# Patient Record
Sex: FEMALE | Race: WHITE | Employment: PART TIME | URBAN - METROPOLITAN AREA
[De-identification: names, ages, dates, MRNs, and addresses within clinical notes are randomized per-mention and may not be internally consistent; named-entity substitution may affect disease eponyms.]

---

## 2018-03-31 ENCOUNTER — HOSPITAL ENCOUNTER (EMERGENCY)
Age: 58
Discharge: HOME | End: 2018-03-31
Attending: EMERGENCY MEDICINE
Payer: MEDICAID

## 2018-03-31 ENCOUNTER — APPOINTMENT (OUTPATIENT)
Dept: CT IMAGING | Age: 58
End: 2018-03-31
Payer: MEDICAID

## 2018-03-31 VITALS
DIASTOLIC BLOOD PRESSURE: 58 MMHG | TEMPERATURE: 98 F | BODY MASS INDEX: 28.34 KG/M2 | HEART RATE: 116 BPM | RESPIRATION RATE: 16 BRPM | OXYGEN SATURATION: 95 % | WEIGHT: 166 LBS | HEIGHT: 64 IN | SYSTOLIC BLOOD PRESSURE: 128 MMHG

## 2018-03-31 DIAGNOSIS — K57.32 DIVERTICULITIS OF COLON: Primary | ICD-10-CM

## 2018-03-31 LAB
ALBUMIN SERPL-MCNC: 4.5 G/DL (ref 3.5–5.2)
ALP BLD-CCNC: 80 U/L (ref 35–104)
ALT SERPL-CCNC: 13 U/L (ref 0–32)
ANION GAP SERPL CALCULATED.3IONS-SCNC: 14 MMOL/L (ref 7–16)
AST SERPL-CCNC: 25 U/L (ref 0–31)
BACTERIA: ABNORMAL /HPF
BASOPHILS ABSOLUTE: 0.08 E9/L (ref 0–0.2)
BASOPHILS RELATIVE PERCENT: 0.8 % (ref 0–2)
BILIRUB SERPL-MCNC: 0.3 MG/DL (ref 0–1.2)
BILIRUBIN URINE: NEGATIVE
BLOOD, URINE: NEGATIVE
BUN BLDV-MCNC: 19 MG/DL (ref 6–20)
CALCIUM SERPL-MCNC: 9.7 MG/DL (ref 8.6–10.2)
CHLORIDE BLD-SCNC: 105 MMOL/L (ref 98–107)
CLARITY: ABNORMAL
CO2: 22 MMOL/L (ref 22–29)
COLOR: ABNORMAL
CREAT SERPL-MCNC: 0.8 MG/DL (ref 0.5–1)
EOSINOPHILS ABSOLUTE: 0.19 E9/L (ref 0.05–0.5)
EOSINOPHILS RELATIVE PERCENT: 1.9 % (ref 0–6)
EPITHELIAL CELLS, UA: ABNORMAL /HPF
GFR AFRICAN AMERICAN: >60
GFR NON-AFRICAN AMERICAN: >60 ML/MIN/1.73
GLUCOSE BLD-MCNC: 145 MG/DL (ref 74–109)
GLUCOSE URINE: NEGATIVE MG/DL
HCT VFR BLD CALC: 43.7 % (ref 34–48)
HEMOGLOBIN: 14.5 G/DL (ref 11.5–15.5)
IMMATURE GRANULOCYTES #: 0.04 E9/L
IMMATURE GRANULOCYTES %: 0.4 % (ref 0–5)
KETONES, URINE: NEGATIVE MG/DL
LACTIC ACID: 1.3 MMOL/L (ref 0.5–2.2)
LEUKOCYTE ESTERASE, URINE: ABNORMAL
LIPASE: 80 U/L (ref 13–60)
LYMPHOCYTES ABSOLUTE: 1.82 E9/L (ref 1.5–4)
LYMPHOCYTES RELATIVE PERCENT: 18.2 % (ref 20–42)
MCH RBC QN AUTO: 28.4 PG (ref 26–35)
MCHC RBC AUTO-ENTMCNC: 33.2 % (ref 32–34.5)
MCV RBC AUTO: 85.7 FL (ref 80–99.9)
MONOCYTES ABSOLUTE: 0.63 E9/L (ref 0.1–0.95)
MONOCYTES RELATIVE PERCENT: 6.3 % (ref 2–12)
NEUTROPHILS ABSOLUTE: 7.22 E9/L (ref 1.8–7.3)
NEUTROPHILS RELATIVE PERCENT: 72.4 % (ref 43–80)
NITRITE, URINE: NEGATIVE
PDW BLD-RTO: 13.8 FL (ref 11.5–15)
PH UA: 5.5 (ref 5–9)
PLATELET # BLD: 155 E9/L (ref 130–450)
PMV BLD AUTO: 11.6 FL (ref 7–12)
POTASSIUM SERPL-SCNC: 4.8 MMOL/L (ref 3.5–5)
PROTEIN UA: NEGATIVE MG/DL
RBC # BLD: 5.1 E12/L (ref 3.5–5.5)
RBC UA: ABNORMAL /HPF (ref 0–2)
SODIUM BLD-SCNC: 141 MMOL/L (ref 132–146)
SPECIFIC GRAVITY UA: <=1.005 (ref 1–1.03)
TOTAL PROTEIN: 7.3 G/DL (ref 6.4–8.3)
UROBILINOGEN, URINE: 0.2 E.U./DL
WBC # BLD: 10 E9/L (ref 4.5–11.5)
WBC UA: ABNORMAL /HPF (ref 0–5)

## 2018-03-31 PROCEDURE — 6370000000 HC RX 637 (ALT 250 FOR IP): Performed by: EMERGENCY MEDICINE

## 2018-03-31 PROCEDURE — 83690 ASSAY OF LIPASE: CPT

## 2018-03-31 PROCEDURE — 96374 THER/PROPH/DIAG INJ IV PUSH: CPT

## 2018-03-31 PROCEDURE — 80053 COMPREHEN METABOLIC PANEL: CPT

## 2018-03-31 PROCEDURE — 81001 URINALYSIS AUTO W/SCOPE: CPT

## 2018-03-31 PROCEDURE — 85025 COMPLETE CBC W/AUTO DIFF WBC: CPT

## 2018-03-31 PROCEDURE — 74177 CT ABD & PELVIS W/CONTRAST: CPT

## 2018-03-31 PROCEDURE — 6360000002 HC RX W HCPCS: Performed by: EMERGENCY MEDICINE

## 2018-03-31 PROCEDURE — 99284 EMERGENCY DEPT VISIT MOD MDM: CPT

## 2018-03-31 PROCEDURE — 36415 COLL VENOUS BLD VENIPUNCTURE: CPT

## 2018-03-31 PROCEDURE — 83605 ASSAY OF LACTIC ACID: CPT

## 2018-03-31 PROCEDURE — 6360000004 HC RX CONTRAST MEDICATION: Performed by: RADIOLOGY

## 2018-03-31 PROCEDURE — 2580000003 HC RX 258: Performed by: EMERGENCY MEDICINE

## 2018-03-31 RX ORDER — KETOROLAC TROMETHAMINE 15 MG/ML
15 INJECTION, SOLUTION INTRAMUSCULAR; INTRAVENOUS ONCE
Status: COMPLETED | OUTPATIENT
Start: 2018-03-31 | End: 2018-03-31

## 2018-03-31 RX ORDER — CIPROFLOXACIN 500 MG/1
500 TABLET, FILM COATED ORAL ONCE
Status: COMPLETED | OUTPATIENT
Start: 2018-03-31 | End: 2018-03-31

## 2018-03-31 RX ORDER — METRONIDAZOLE 500 MG/1
500 TABLET ORAL 2 TIMES DAILY
Qty: 14 TABLET | Refills: 0 | Status: SHIPPED | OUTPATIENT
Start: 2018-03-31 | End: 2018-04-07

## 2018-03-31 RX ORDER — 0.9 % SODIUM CHLORIDE 0.9 %
1000 INTRAVENOUS SOLUTION INTRAVENOUS ONCE
Status: COMPLETED | OUTPATIENT
Start: 2018-03-31 | End: 2018-03-31

## 2018-03-31 RX ORDER — METRONIDAZOLE 500 MG/1
500 TABLET ORAL ONCE
Status: COMPLETED | OUTPATIENT
Start: 2018-03-31 | End: 2018-03-31

## 2018-03-31 RX ORDER — CIPROFLOXACIN 500 MG/1
500 TABLET, FILM COATED ORAL 2 TIMES DAILY
Qty: 14 TABLET | Refills: 0 | Status: SHIPPED | OUTPATIENT
Start: 2018-03-31 | End: 2018-04-07

## 2018-03-31 RX ORDER — HYDROCODONE BITARTRATE AND ACETAMINOPHEN 5; 325 MG/1; MG/1
1 TABLET ORAL 3 TIMES DAILY PRN
Qty: 16 TABLET | Refills: 0 | Status: SHIPPED | OUTPATIENT
Start: 2018-03-31 | End: 2018-04-04

## 2018-03-31 RX ADMIN — IOPAMIDOL 80 ML: 755 INJECTION, SOLUTION INTRAVENOUS at 22:06

## 2018-03-31 RX ADMIN — METRONIDAZOLE 500 MG: 500 TABLET ORAL at 22:57

## 2018-03-31 RX ADMIN — CIPROFLOXACIN HYDROCHLORIDE 500 MG: 500 TABLET, FILM COATED ORAL at 22:57

## 2018-03-31 RX ADMIN — KETOROLAC TROMETHAMINE 15 MG: 15 INJECTION, SOLUTION INTRAMUSCULAR; INTRAVENOUS at 20:51

## 2018-03-31 RX ADMIN — SODIUM CHLORIDE 1000 ML: 9 INJECTION, SOLUTION INTRAVENOUS at 20:51

## 2018-03-31 RX ADMIN — IOHEXOL 50 ML: 240 INJECTION, SOLUTION INTRATHECAL; INTRAVASCULAR; INTRAVENOUS; ORAL at 22:06

## 2018-03-31 ASSESSMENT — PAIN DESCRIPTION - LOCATION: LOCATION: ABDOMEN

## 2018-03-31 ASSESSMENT — PAIN DESCRIPTION - ORIENTATION: ORIENTATION: LEFT;LOWER

## 2018-03-31 ASSESSMENT — ENCOUNTER SYMPTOMS
BLOOD IN STOOL: 1
ABDOMINAL PAIN: 0
BACK PAIN: 1
SHORTNESS OF BREATH: 0
BLURRED VISION: 0
DIARRHEA: 0
NAUSEA: 0
VOMITING: 0

## 2018-03-31 ASSESSMENT — PAIN SCALES - GENERAL
PAINLEVEL_OUTOF10: 4
PAINLEVEL_OUTOF10: 6

## 2018-03-31 ASSESSMENT — PAIN DESCRIPTION - DESCRIPTORS: DESCRIPTORS: PATIENT UNABLE TO DESCRIBE

## 2018-03-31 ASSESSMENT — PAIN DESCRIPTION - PAIN TYPE: TYPE: ACUTE PAIN

## 2018-03-31 ASSESSMENT — PAIN DESCRIPTION - FREQUENCY: FREQUENCY: INTERMITTENT

## 2018-07-05 ENCOUNTER — HOSPITAL ENCOUNTER (EMERGENCY)
Age: 58
Discharge: HOME | End: 2018-07-05
Payer: MEDICAID

## 2018-07-05 ENCOUNTER — APPOINTMENT (OUTPATIENT)
Dept: GENERAL RADIOLOGY | Age: 58
End: 2018-07-05
Payer: MEDICAID

## 2018-07-05 VITALS
DIASTOLIC BLOOD PRESSURE: 55 MMHG | OXYGEN SATURATION: 97 % | SYSTOLIC BLOOD PRESSURE: 114 MMHG | TEMPERATURE: 98.2 F | HEIGHT: 64 IN | BODY MASS INDEX: 27.66 KG/M2 | WEIGHT: 162 LBS | HEART RATE: 97 BPM | RESPIRATION RATE: 18 BRPM

## 2018-07-05 DIAGNOSIS — S20.212A CONTUSION OF LEFT CHEST WALL, INITIAL ENCOUNTER: Primary | ICD-10-CM

## 2018-07-05 PROCEDURE — 6360000002 HC RX W HCPCS: Performed by: PHYSICIAN ASSISTANT

## 2018-07-05 PROCEDURE — 71101 X-RAY EXAM UNILAT RIBS/CHEST: CPT

## 2018-07-05 PROCEDURE — 99283 EMERGENCY DEPT VISIT LOW MDM: CPT

## 2018-07-05 PROCEDURE — 96372 THER/PROPH/DIAG INJ SC/IM: CPT

## 2018-07-05 RX ORDER — KETOROLAC TROMETHAMINE 30 MG/ML
30 INJECTION, SOLUTION INTRAMUSCULAR; INTRAVENOUS ONCE
Status: COMPLETED | OUTPATIENT
Start: 2018-07-05 | End: 2018-07-05

## 2018-07-05 RX ORDER — NAPROXEN 500 MG/1
500 TABLET ORAL 2 TIMES DAILY
Qty: 14 TABLET | Refills: 0 | Status: SHIPPED | OUTPATIENT
Start: 2018-07-05 | End: 2020-10-17

## 2018-07-05 RX ADMIN — KETOROLAC TROMETHAMINE 30 MG: 30 INJECTION, SOLUTION INTRAMUSCULAR at 15:20

## 2018-07-05 ASSESSMENT — PAIN SCALES - GENERAL
PAINLEVEL_OUTOF10: 9
PAINLEVEL_OUTOF10: 9

## 2018-07-05 NOTE — ED PROVIDER NOTES
[facility]      HPI:  7/5/18,   Time: 3:15 PM         Tiff Ramsey is a 66 y.o. female presenting to the ED for fall/left rib pain, beginning few hours ago. The complaint has been persistent, moderate in severity, and worsened by bending, twisting. Patient states that she tripped over her toddler grandchild earlier today and hit her left anterior chest wall on the back of a couch. She states that she had some pain at the time and attempted to go to work but the longer she stayed at work and continued to do her job she had increasing amounts of pain. The patient states the pain is all just under her left breast over the ribs. She states that it's worse with bending and moving and turning. She's not had any shortness of breath or trouble breathing. She is a chronic smoker. States that she took 800 mg of Motrin after the incident at 8 AM        ROS:     Constitutional: Negative for fever and chills  HENT: Negative for ear pain, sore throat and sinus pressure  Eyes: Negative for pain, discharge and redness  Respiratory:  Negative for shortness of breath, cough and wheezing  Cardiovascular:  See HPI  Gastrointestinal: Negative for nausea, vomiting, diarrhea and abdominal distention  Genitourinary: Negative for dysuria and frequency  Musculoskeletal: See HPI  Skin: Negative for rash and wound  Neurological: Negative for weakness and headaches  Hematological: Negative for adenopathy    All other systems reviewed and are negative      --------------------------------------------- PAST HISTORY ---------------------------------------------  Past Medical History:  has a past medical history of Diabetes mellitus ([address] and Hyperlipidemia. Past Surgical History:  has a past surgical history that includes Tonsillectomy. Social History:  reports that she has been smoking Cigarettes. She has been smoking about 0.50 packs per day. She has never used smokeless tobacco. She reports that she drinks alcohol.  She reports that she does not use drugs. Family History: family history is not on file. The patients home medications have been reviewed. Allergies: Patient has no known allergies. -------------------------------------------------- RESULTS -------------------------------------------------  All laboratory and radiology results have been personally reviewed by myself   LABS:  No results found for this visit on 07/05/18. RADIOLOGY:  Interpreted by Radiologist.  XR RIBS LEFT INCLUDE CHEST (MIN 3 VIEWS)   Final Result      1. Normal left ribs. 2. Normal frontal chest.             ------------------------- NURSING NOTES AND VITALS REVIEWED ---------------------------   The nursing notes within the ED encounter and vital signs as below have been reviewed. BP (!) 114/55   Pulse 97   Temp 98.2 °F (36.8 °C)   Resp 18   Ht 5' 4\" (1.626 m)   Wt 162 lb (73.5 kg)   SpO2 97%   BMI 27.81 kg/m²   Oxygen Saturation Interpretation: Normal      ---------------------------------------------------PHYSICAL EXAM--------------------------------------      Constitutional/General: Alert and oriented x3, well appearing, non toxic in NAD  Head: NC/AT  Eyes: PERRL, EOMI  Mouth: Oropharynx clear, handling secretions, no trismus  Neck: Supple, full ROM, no meningeal signs  Pulmonary: Lungs clear to auscultation bilaterally, no wheezes, rales, or rhonchi. Not in respiratory distress  Cardiovascular:  Regular rate and rhythm, no murmurs, gallops, or rubs. 2+ distal pulses  Chest:  No visible swelling, rash or trauma. Pt is tender over right anterior distal ribs. No palpable crepitations. Abdomen: Soft, + BS. No distension. Nontender. No tenderness in LUQ>   No palpable rigidity, rebound or guarding  Extremities: Moves all extremities x 4. Warm and well perfused  Skin: warm and dry without rash  Neurologic: GCS 15,  Intact.   No focal deficits  Psych: Normal Affect      ------------------------------ ED COURSE/MEDICAL DECISION MAKING----------------------  Medications   ketorolac (TORADOL) injection 30 mg (30 mg Intramuscular Given 7/5/18 1520)         Medical Decision Making:    No acute displaced fracture seen. Lung fields clear. No abdominal pain. Plan discharge home with meds for pain. F/U PCP as needed if persistent. Counseling: The emergency provider has spoken with the patient and discussed todays results, in addition to providing specific details for the plan of care and counseling regarding the diagnosis and prognosis. Questions are answered at this time and they are agreeable with the plan.      --------------------------------- IMPRESSION AND DISPOSITION ---------------------------------    IMPRESSION  1.  Contusion of left chest wall, initial encounter        DISPOSITION  Disposition: Discharge to home  Patient condition is stable                   Serenity Cortés PA-C  07/07/18 4050

## 2018-07-31 ENCOUNTER — HOSPITAL ENCOUNTER (OUTPATIENT)
Dept: GENERAL RADIOLOGY | Age: 58
Discharge: HOME | End: 2018-08-02
Payer: MEDICAID

## 2018-07-31 ENCOUNTER — HOSPITAL ENCOUNTER (OUTPATIENT)
Age: 58
Discharge: HOME | End: 2018-08-02
Payer: MEDICAID

## 2018-07-31 DIAGNOSIS — J44.9 CHRONIC OBSTRUCTIVE PULMONARY DISEASE, UNSPECIFIED COPD TYPE (HCC): ICD-10-CM

## 2018-07-31 DIAGNOSIS — S20.20XA CONTUSION OF THORACIC WALL, UNSPECIFIED AREA OF THORACIC WALL, INITIAL ENCOUNTER: ICD-10-CM

## 2018-07-31 PROCEDURE — 71100 X-RAY EXAM RIBS UNI 2 VIEWS: CPT

## 2018-07-31 PROCEDURE — 71046 X-RAY EXAM CHEST 2 VIEWS: CPT

## 2018-09-04 ENCOUNTER — HOSPITAL ENCOUNTER (OUTPATIENT)
Dept: GENERAL RADIOLOGY | Age: 58
Discharge: HOME | End: 2018-09-06
Payer: MEDICAID

## 2018-09-04 ENCOUNTER — HOSPITAL ENCOUNTER (OUTPATIENT)
Age: 58
Discharge: HOME | End: 2018-09-06
Payer: MEDICAID

## 2018-09-04 DIAGNOSIS — R52 PAIN: ICD-10-CM

## 2018-09-04 PROCEDURE — 71101 X-RAY EXAM UNILAT RIBS/CHEST: CPT

## 2018-10-05 ENCOUNTER — HOSPITAL ENCOUNTER (OUTPATIENT)
Age: 58
Discharge: HOME | End: 2018-10-07
Payer: MEDICAID

## 2018-10-05 ENCOUNTER — HOSPITAL ENCOUNTER (OUTPATIENT)
Dept: GENERAL RADIOLOGY | Age: 58
Discharge: HOME | End: 2018-10-07
Payer: MEDICAID

## 2018-10-05 DIAGNOSIS — R05.9 COUGH: ICD-10-CM

## 2018-10-05 PROCEDURE — 71048 X-RAY EXAM CHEST 4+ VIEWS: CPT

## 2018-10-23 ENCOUNTER — HOSPITAL ENCOUNTER (OUTPATIENT)
Dept: MAMMOGRAPHY | Age: 58
Discharge: HOME | End: 2018-10-25
Payer: MEDICAID

## 2018-10-23 DIAGNOSIS — Z12.39 BREAST SCREENING: ICD-10-CM

## 2018-10-23 PROCEDURE — 77067 SCR MAMMO BI INCL CAD: CPT

## 2018-12-05 ENCOUNTER — HOSPITAL ENCOUNTER (OUTPATIENT)
Dept: GENERAL RADIOLOGY | Age: 58
Discharge: HOME | End: 2018-12-07
Payer: MEDICAID

## 2018-12-05 ENCOUNTER — HOSPITAL ENCOUNTER (OUTPATIENT)
Age: 58
Discharge: HOME | End: 2018-12-07
Payer: MEDICAID

## 2018-12-05 DIAGNOSIS — J40 BRONCHITIS: ICD-10-CM

## 2018-12-05 PROCEDURE — 71046 X-RAY EXAM CHEST 2 VIEWS: CPT

## 2019-03-13 ENCOUNTER — HOSPITAL ENCOUNTER (OUTPATIENT)
Age: 59
Discharge: HOME | End: 2019-03-13
Payer: MEDICAID

## 2019-03-13 LAB
ALBUMIN SERPL-MCNC: 4.3 G/DL (ref 3.5–5.2)
ALP BLD-CCNC: 87 U/L (ref 35–104)
ALT SERPL-CCNC: 21 U/L (ref 0–32)
ANION GAP SERPL CALCULATED.3IONS-SCNC: 9 MMOL/L (ref 7–16)
AST SERPL-CCNC: 15 U/L (ref 0–31)
BASOPHILS ABSOLUTE: 0.06 E9/L (ref 0–0.2)
BASOPHILS RELATIVE PERCENT: 1.1 % (ref 0–2)
BILIRUB SERPL-MCNC: 0.4 MG/DL (ref 0–1.2)
BILIRUBIN URINE: NEGATIVE
BLOOD, URINE: NEGATIVE
BUN BLDV-MCNC: 17 MG/DL (ref 6–20)
CALCIUM SERPL-MCNC: 9.4 MG/DL (ref 8.6–10.2)
CHLORIDE BLD-SCNC: 105 MMOL/L (ref 98–107)
CHOLESTEROL, TOTAL: 110 MG/DL (ref 0–199)
CLARITY: CLEAR
CO2: 25 MMOL/L (ref 22–29)
COLOR: YELLOW
CREAT SERPL-MCNC: 0.7 MG/DL (ref 0.5–1)
EOSINOPHILS ABSOLUTE: 0.17 E9/L (ref 0.05–0.5)
EOSINOPHILS RELATIVE PERCENT: 3.1 % (ref 0–6)
GFR AFRICAN AMERICAN: >60
GFR NON-AFRICAN AMERICAN: >60 ML/MIN/1.73
GLUCOSE BLD-MCNC: 276 MG/DL (ref 74–99)
GLUCOSE URINE: 250 MG/DL
HBA1C MFR BLD: 9.7 % (ref 4–5.6)
HCT VFR BLD CALC: 45.7 % (ref 34–48)
HDLC SERPL-MCNC: 20 MG/DL
HEMOGLOBIN: 14.2 G/DL (ref 11.5–15.5)
IMMATURE GRANULOCYTES #: 0.04 E9/L
IMMATURE GRANULOCYTES %: 0.7 % (ref 0–5)
KETONES, URINE: NEGATIVE MG/DL
LDL CHOLESTEROL CALCULATED: ABNORMAL MG/DL (ref 0–99)
LEUKOCYTE ESTERASE, URINE: NEGATIVE
LYMPHOCYTES ABSOLUTE: 1.19 E9/L (ref 1.5–4)
LYMPHOCYTES RELATIVE PERCENT: 21.8 % (ref 20–42)
MCH RBC QN AUTO: 26.7 PG (ref 26–35)
MCHC RBC AUTO-ENTMCNC: 31.1 % (ref 32–34.5)
MCV RBC AUTO: 86.1 FL (ref 80–99.9)
MONOCYTES ABSOLUTE: 0.31 E9/L (ref 0.1–0.95)
MONOCYTES RELATIVE PERCENT: 5.7 % (ref 2–12)
NEUTROPHILS ABSOLUTE: 3.69 E9/L (ref 1.8–7.3)
NEUTROPHILS RELATIVE PERCENT: 67.6 % (ref 43–80)
NITRITE, URINE: NEGATIVE
PDW BLD-RTO: 14.2 FL (ref 11.5–15)
PH UA: 5.5 (ref 5–9)
PLATELET # BLD: 121 E9/L (ref 130–450)
PMV BLD AUTO: 11.2 FL (ref 7–12)
POTASSIUM SERPL-SCNC: 4.6 MMOL/L (ref 3.5–5)
PROTEIN UA: NEGATIVE MG/DL
RBC # BLD: 5.31 E12/L (ref 3.5–5.5)
SODIUM BLD-SCNC: 139 MMOL/L (ref 132–146)
SPECIFIC GRAVITY UA: 1.02 (ref 1–1.03)
T4 TOTAL: 6.3 MCG/DL (ref 4.5–11.7)
TOTAL PROTEIN: 7.2 G/DL (ref 6.4–8.3)
TRIGL SERPL-MCNC: 480 MG/DL (ref 0–149)
TSH SERPL DL<=0.05 MIU/L-ACNC: 1.11 UIU/ML (ref 0.27–4.2)
UROBILINOGEN, URINE: 0.2 E.U./DL
VLDLC SERPL CALC-MCNC: ABNORMAL MG/DL
WBC # BLD: 5.5 E9/L (ref 4.5–11.5)

## 2019-03-13 PROCEDURE — 36415 COLL VENOUS BLD VENIPUNCTURE: CPT

## 2019-03-13 PROCEDURE — 83036 HEMOGLOBIN GLYCOSYLATED A1C: CPT

## 2019-03-13 PROCEDURE — 80061 LIPID PANEL: CPT

## 2019-03-13 PROCEDURE — 80053 COMPREHEN METABOLIC PANEL: CPT

## 2019-03-13 PROCEDURE — 84436 ASSAY OF TOTAL THYROXINE: CPT

## 2019-03-13 PROCEDURE — 84443 ASSAY THYROID STIM HORMONE: CPT

## 2019-03-13 PROCEDURE — 81003 URINALYSIS AUTO W/O SCOPE: CPT

## 2019-03-13 PROCEDURE — 85025 COMPLETE CBC W/AUTO DIFF WBC: CPT

## 2019-04-09 ENCOUNTER — HOSPITAL ENCOUNTER (OUTPATIENT)
Dept: GENERAL RADIOLOGY | Age: 59
Discharge: HOME | End: 2019-04-11
Payer: MEDICAID

## 2019-04-09 ENCOUNTER — HOSPITAL ENCOUNTER (OUTPATIENT)
Age: 59
Discharge: HOME | End: 2019-04-11
Payer: MEDICAID

## 2019-04-09 DIAGNOSIS — R06.02 SOB (SHORTNESS OF BREATH): ICD-10-CM

## 2019-04-09 DIAGNOSIS — M25.561 RIGHT KNEE PAIN, UNSPECIFIED CHRONICITY: ICD-10-CM

## 2019-04-09 PROCEDURE — 71046 X-RAY EXAM CHEST 2 VIEWS: CPT

## 2019-04-09 PROCEDURE — 73562 X-RAY EXAM OF KNEE 3: CPT

## 2019-08-14 ENCOUNTER — HOSPITAL ENCOUNTER (EMERGENCY)
Age: 59
Discharge: HOME | End: 2019-08-14
Attending: EMERGENCY MEDICINE
Payer: MEDICAID

## 2019-08-14 ENCOUNTER — APPOINTMENT (OUTPATIENT)
Dept: CT IMAGING | Age: 59
End: 2019-08-14
Payer: MEDICAID

## 2019-08-14 VITALS
WEIGHT: 170.13 LBS | TEMPERATURE: 98.3 F | OXYGEN SATURATION: 95 % | BODY MASS INDEX: 29.05 KG/M2 | RESPIRATION RATE: 18 BRPM | SYSTOLIC BLOOD PRESSURE: 113 MMHG | HEIGHT: 64 IN | HEART RATE: 86 BPM | DIASTOLIC BLOOD PRESSURE: 53 MMHG

## 2019-08-14 DIAGNOSIS — R11.0 NAUSEA: ICD-10-CM

## 2019-08-14 DIAGNOSIS — R10.13 ABDOMINAL PAIN, EPIGASTRIC: Primary | ICD-10-CM

## 2019-08-14 LAB
ALBUMIN SERPL-MCNC: 4.6 G/DL (ref 3.5–5.2)
ALP BLD-CCNC: 84 U/L (ref 35–104)
ALT SERPL-CCNC: 19 U/L (ref 0–32)
ANION GAP SERPL CALCULATED.3IONS-SCNC: 12 MMOL/L (ref 7–16)
AST SERPL-CCNC: 15 U/L (ref 0–31)
BACTERIA: ABNORMAL /HPF
BASOPHILS ABSOLUTE: 0.11 E9/L (ref 0–0.2)
BASOPHILS RELATIVE PERCENT: 1 % (ref 0–2)
BILIRUB SERPL-MCNC: <0.2 MG/DL (ref 0–1.2)
BILIRUBIN URINE: NEGATIVE
BLOOD, URINE: NEGATIVE
BUN BLDV-MCNC: 15 MG/DL (ref 6–20)
CALCIUM SERPL-MCNC: 9.4 MG/DL (ref 8.6–10.2)
CHLORIDE BLD-SCNC: 104 MMOL/L (ref 98–107)
CLARITY: CLEAR
CO2: 21 MMOL/L (ref 22–29)
COLOR: YELLOW
CREAT SERPL-MCNC: 0.7 MG/DL (ref 0.5–1)
EOSINOPHILS ABSOLUTE: 0.22 E9/L (ref 0.05–0.5)
EOSINOPHILS RELATIVE PERCENT: 1.9 % (ref 0–6)
EPITHELIAL CELLS, UA: ABNORMAL /HPF
GFR AFRICAN AMERICAN: >60
GFR NON-AFRICAN AMERICAN: >60 ML/MIN/1.73
GLUCOSE BLD-MCNC: 147 MG/DL (ref 74–99)
GLUCOSE URINE: NEGATIVE MG/DL
HCT VFR BLD CALC: 46.6 % (ref 34–48)
HEMOGLOBIN: 14.8 G/DL (ref 11.5–15.5)
IMMATURE GRANULOCYTES #: 0.08 E9/L
IMMATURE GRANULOCYTES %: 0.7 % (ref 0–5)
KETONES, URINE: NEGATIVE MG/DL
LACTIC ACID: 1.4 MMOL/L (ref 0.5–2.2)
LEUKOCYTE ESTERASE, URINE: ABNORMAL
LIPASE: 185 U/L (ref 13–60)
LYMPHOCYTES ABSOLUTE: 2.07 E9/L (ref 1.5–4)
LYMPHOCYTES RELATIVE PERCENT: 18 % (ref 20–42)
MCH RBC QN AUTO: 27 PG (ref 26–35)
MCHC RBC AUTO-ENTMCNC: 31.8 % (ref 32–34.5)
MCV RBC AUTO: 84.9 FL (ref 80–99.9)
MONOCYTES ABSOLUTE: 0.54 E9/L (ref 0.1–0.95)
MONOCYTES RELATIVE PERCENT: 4.7 % (ref 2–12)
NEUTROPHILS ABSOLUTE: 8.51 E9/L (ref 1.8–7.3)
NEUTROPHILS RELATIVE PERCENT: 73.7 % (ref 43–80)
NITRITE, URINE: NEGATIVE
PDW BLD-RTO: 14.6 FL (ref 11.5–15)
PH UA: 5.5 (ref 5–9)
PLATELET # BLD: 149 E9/L (ref 130–450)
PMV BLD AUTO: 10.8 FL (ref 7–12)
POTASSIUM SERPL-SCNC: 4 MMOL/L (ref 3.5–5)
PROTEIN UA: NEGATIVE MG/DL
RBC # BLD: 5.49 E12/L (ref 3.5–5.5)
RBC UA: ABNORMAL /HPF (ref 0–2)
SODIUM BLD-SCNC: 137 MMOL/L (ref 132–146)
SPECIFIC GRAVITY UA: 1.02 (ref 1–1.03)
TOTAL PROTEIN: 7.3 G/DL (ref 6.4–8.3)
UROBILINOGEN, URINE: 0.2 E.U./DL
WBC # BLD: 11.5 E9/L (ref 4.5–11.5)
WBC UA: ABNORMAL /HPF (ref 0–5)

## 2019-08-14 PROCEDURE — 87088 URINE BACTERIA CULTURE: CPT

## 2019-08-14 PROCEDURE — 96374 THER/PROPH/DIAG INJ IV PUSH: CPT

## 2019-08-14 PROCEDURE — 36415 COLL VENOUS BLD VENIPUNCTURE: CPT

## 2019-08-14 PROCEDURE — 74177 CT ABD & PELVIS W/CONTRAST: CPT

## 2019-08-14 PROCEDURE — 96375 TX/PRO/DX INJ NEW DRUG ADDON: CPT

## 2019-08-14 PROCEDURE — 93005 ELECTROCARDIOGRAM TRACING: CPT | Performed by: EMERGENCY MEDICINE

## 2019-08-14 PROCEDURE — 6360000004 HC RX CONTRAST MEDICATION: Performed by: RADIOLOGY

## 2019-08-14 PROCEDURE — 80053 COMPREHEN METABOLIC PANEL: CPT

## 2019-08-14 PROCEDURE — 6360000002 HC RX W HCPCS: Performed by: EMERGENCY MEDICINE

## 2019-08-14 PROCEDURE — 83690 ASSAY OF LIPASE: CPT

## 2019-08-14 PROCEDURE — 81001 URINALYSIS AUTO W/SCOPE: CPT

## 2019-08-14 PROCEDURE — 85025 COMPLETE CBC W/AUTO DIFF WBC: CPT

## 2019-08-14 PROCEDURE — 99284 EMERGENCY DEPT VISIT MOD MDM: CPT

## 2019-08-14 PROCEDURE — 2580000003 HC RX 258: Performed by: EMERGENCY MEDICINE

## 2019-08-14 PROCEDURE — 83605 ASSAY OF LACTIC ACID: CPT

## 2019-08-14 PROCEDURE — 2500000003 HC RX 250 WO HCPCS: Performed by: EMERGENCY MEDICINE

## 2019-08-14 RX ORDER — KETOROLAC TROMETHAMINE 30 MG/ML
30 INJECTION, SOLUTION INTRAMUSCULAR; INTRAVENOUS ONCE
Status: COMPLETED | OUTPATIENT
Start: 2019-08-14 | End: 2019-08-14

## 2019-08-14 RX ORDER — 0.9 % SODIUM CHLORIDE 0.9 %
1000 INTRAVENOUS SOLUTION INTRAVENOUS ONCE
Status: COMPLETED | OUTPATIENT
Start: 2019-08-14 | End: 2019-08-14

## 2019-08-14 RX ORDER — ONDANSETRON 4 MG/1
4 TABLET, ORALLY DISINTEGRATING ORAL EVERY 8 HOURS PRN
Qty: 10 TABLET | Refills: 0 | Status: SHIPPED | OUTPATIENT
Start: 2019-08-14 | End: 2020-02-20 | Stop reason: ALTCHOICE

## 2019-08-14 RX ORDER — FAMOTIDINE 20 MG/1
20 TABLET, FILM COATED ORAL 2 TIMES DAILY
Qty: 14 TABLET | Refills: 0 | Status: SHIPPED | OUTPATIENT
Start: 2019-08-14 | End: 2020-10-17

## 2019-08-14 RX ORDER — ONDANSETRON 2 MG/ML
4 INJECTION INTRAMUSCULAR; INTRAVENOUS ONCE
Status: COMPLETED | OUTPATIENT
Start: 2019-08-14 | End: 2019-08-14

## 2019-08-14 RX ADMIN — IOPAMIDOL 80 ML: 755 INJECTION, SOLUTION INTRAVENOUS at 21:02

## 2019-08-14 RX ADMIN — KETOROLAC TROMETHAMINE 30 MG: 30 INJECTION, SOLUTION INTRAMUSCULAR at 19:57

## 2019-08-14 RX ADMIN — ONDANSETRON 4 MG: 2 INJECTION INTRAMUSCULAR; INTRAVENOUS at 19:54

## 2019-08-14 RX ADMIN — SODIUM CHLORIDE 1000 ML: 9 INJECTION, SOLUTION INTRAVENOUS at 19:54

## 2019-08-14 RX ADMIN — FAMOTIDINE 20 MG: 10 INJECTION, SOLUTION INTRAVENOUS at 19:54

## 2019-08-14 ASSESSMENT — PAIN SCALES - GENERAL
PAINLEVEL_OUTOF10: 6
PAINLEVEL_OUTOF10: 10

## 2019-08-14 ASSESSMENT — PAIN - FUNCTIONAL ASSESSMENT: PAIN_FUNCTIONAL_ASSESSMENT: PREVENTS OR INTERFERES SOME ACTIVE ACTIVITIES AND ADLS

## 2019-08-14 ASSESSMENT — PAIN DESCRIPTION - ONSET: ONSET: ON-GOING

## 2019-08-14 ASSESSMENT — PAIN DESCRIPTION - FREQUENCY: FREQUENCY: CONTINUOUS

## 2019-08-14 ASSESSMENT — PAIN DESCRIPTION - LOCATION: LOCATION: ABDOMEN

## 2019-08-14 ASSESSMENT — PAIN DESCRIPTION - ORIENTATION: ORIENTATION: MID

## 2019-08-14 ASSESSMENT — PAIN DESCRIPTION - DESCRIPTORS: DESCRIPTORS: DISCOMFORT;CRAMPING

## 2019-08-15 LAB
EKG ATRIAL RATE: 96 BPM
EKG P AXIS: 56 DEGREES
EKG P-R INTERVAL: 140 MS
EKG Q-T INTERVAL: 358 MS
EKG QRS DURATION: 78 MS
EKG QTC CALCULATION (BAZETT): 452 MS
EKG R AXIS: 75 DEGREES
EKG T AXIS: 67 DEGREES
EKG VENTRICULAR RATE: 96 BPM

## 2019-08-15 PROCEDURE — 93010 ELECTROCARDIOGRAM REPORT: CPT | Performed by: INTERNAL MEDICINE

## 2019-08-17 LAB — URINE CULTURE, ROUTINE: NORMAL

## 2019-10-25 ENCOUNTER — HOSPITAL ENCOUNTER (OUTPATIENT)
Dept: MAMMOGRAPHY | Age: 59
Discharge: HOME | End: 2019-10-27
Payer: MEDICAID

## 2019-10-25 DIAGNOSIS — Z12.39 BREAST CANCER SCREENING: ICD-10-CM

## 2019-10-25 PROCEDURE — 77067 SCR MAMMO BI INCL CAD: CPT

## 2019-11-01 ENCOUNTER — HOSPITAL ENCOUNTER (OUTPATIENT)
Age: 59
Discharge: HOME | End: 2019-11-01
Payer: MEDICAID

## 2019-11-01 LAB
ALBUMIN SERPL-MCNC: 4.5 G/DL (ref 3.5–5.2)
ALP BLD-CCNC: 96 U/L (ref 35–104)
ALT SERPL-CCNC: 17 U/L (ref 0–32)
AST SERPL-CCNC: 16 U/L (ref 0–31)
BILIRUB SERPL-MCNC: 0.3 MG/DL (ref 0–1.2)
BILIRUBIN DIRECT: <0.2 MG/DL (ref 0–0.3)
BILIRUBIN, INDIRECT: NORMAL MG/DL (ref 0–1)
TOTAL PROTEIN: 7.4 G/DL (ref 6.4–8.3)

## 2019-11-01 PROCEDURE — 80074 ACUTE HEPATITIS PANEL: CPT

## 2019-11-01 PROCEDURE — 36415 COLL VENOUS BLD VENIPUNCTURE: CPT

## 2019-11-01 PROCEDURE — 80076 HEPATIC FUNCTION PANEL: CPT

## 2019-11-04 LAB
HAV IGM SER IA-ACNC: NORMAL
HEPATITIS B CORE IGM ANTIBODY: NORMAL
HEPATITIS B SURFACE ANTIGEN INTERPRETATION: NORMAL
HEPATITIS C ANTIBODY INTERPRETATION: NORMAL

## 2019-11-19 ENCOUNTER — HOSPITAL ENCOUNTER (OUTPATIENT)
Age: 59
Discharge: HOME | End: 2019-11-19
Payer: MEDICAID

## 2019-11-19 ENCOUNTER — HOSPITAL ENCOUNTER (OUTPATIENT)
Dept: CT IMAGING | Age: 59
Discharge: HOME | End: 2019-11-19
Payer: MEDICAID

## 2019-11-19 DIAGNOSIS — K92.2 LOWER GI BLEED: ICD-10-CM

## 2019-11-19 LAB
BUN BLDV-MCNC: 15 MG/DL (ref 6–20)
CREAT SERPL-MCNC: 0.8 MG/DL (ref 0.5–1)
GFR AFRICAN AMERICAN: >60
GFR NON-AFRICAN AMERICAN: >60 ML/MIN/1.73

## 2019-11-19 PROCEDURE — 6360000004 HC RX CONTRAST MEDICATION: Performed by: RADIOLOGY

## 2019-11-19 PROCEDURE — 74177 CT ABD & PELVIS W/CONTRAST: CPT

## 2019-11-19 PROCEDURE — 36415 COLL VENOUS BLD VENIPUNCTURE: CPT

## 2019-11-19 PROCEDURE — 84520 ASSAY OF UREA NITROGEN: CPT

## 2019-11-19 PROCEDURE — 82565 ASSAY OF CREATININE: CPT

## 2019-11-19 RX ADMIN — IOPAMIDOL 80 ML: 755 INJECTION, SOLUTION INTRAVENOUS at 15:31

## 2019-11-19 RX ADMIN — IOHEXOL 50 ML: 240 INJECTION, SOLUTION INTRATHECAL; INTRAVASCULAR; INTRAVENOUS; ORAL at 15:31

## 2019-11-22 ENCOUNTER — HOSPITAL ENCOUNTER (OUTPATIENT)
Age: 59
Discharge: HOME | End: 2019-11-22
Payer: MEDICAID

## 2019-11-22 LAB
BUN BLDV-MCNC: 16 MG/DL (ref 6–20)
CREAT SERPL-MCNC: 0.8 MG/DL (ref 0.5–1)
GFR AFRICAN AMERICAN: >60
GFR NON-AFRICAN AMERICAN: >60 ML/MIN/1.73

## 2019-11-22 PROCEDURE — 36415 COLL VENOUS BLD VENIPUNCTURE: CPT

## 2019-11-22 PROCEDURE — 84520 ASSAY OF UREA NITROGEN: CPT

## 2019-11-22 PROCEDURE — 82565 ASSAY OF CREATININE: CPT

## 2020-02-20 ENCOUNTER — APPOINTMENT (OUTPATIENT)
Dept: CT IMAGING | Age: 60
End: 2020-02-20
Payer: MEDICAID

## 2020-02-20 ENCOUNTER — HOSPITAL ENCOUNTER (EMERGENCY)
Age: 60
Discharge: HOME | End: 2020-02-20
Attending: EMERGENCY MEDICINE
Payer: MEDICAID

## 2020-02-20 VITALS
DIASTOLIC BLOOD PRESSURE: 61 MMHG | BODY MASS INDEX: 27.83 KG/M2 | TEMPERATURE: 97.6 F | WEIGHT: 163 LBS | HEART RATE: 81 BPM | OXYGEN SATURATION: 95 % | HEIGHT: 64 IN | SYSTOLIC BLOOD PRESSURE: 104 MMHG | RESPIRATION RATE: 16 BRPM

## 2020-02-20 LAB
ALBUMIN SERPL-MCNC: 4.6 G/DL (ref 3.5–5.2)
ALP BLD-CCNC: 95 U/L (ref 35–104)
ALT SERPL-CCNC: 16 U/L (ref 0–32)
ANION GAP SERPL CALCULATED.3IONS-SCNC: 13 MMOL/L (ref 7–16)
AST SERPL-CCNC: 14 U/L (ref 0–31)
BACTERIA: ABNORMAL /HPF
BASOPHILS ABSOLUTE: 0.08 E9/L (ref 0–0.2)
BASOPHILS RELATIVE PERCENT: 0.7 % (ref 0–2)
BILIRUB SERPL-MCNC: 0.4 MG/DL (ref 0–1.2)
BILIRUBIN URINE: NEGATIVE
BLOOD, URINE: NEGATIVE
BUN BLDV-MCNC: 15 MG/DL (ref 6–20)
CALCIUM SERPL-MCNC: 9.6 MG/DL (ref 8.6–10.2)
CHLORIDE BLD-SCNC: 103 MMOL/L (ref 98–107)
CLARITY: CLEAR
CO2: 22 MMOL/L (ref 22–29)
COLOR: YELLOW
CREAT SERPL-MCNC: 0.6 MG/DL (ref 0.5–1)
EKG ATRIAL RATE: 91 BPM
EKG P AXIS: 61 DEGREES
EKG P-R INTERVAL: 150 MS
EKG Q-T INTERVAL: 356 MS
EKG QRS DURATION: 84 MS
EKG QTC CALCULATION (BAZETT): 437 MS
EKG R AXIS: 79 DEGREES
EKG T AXIS: 64 DEGREES
EKG VENTRICULAR RATE: 91 BPM
EOSINOPHILS ABSOLUTE: 0.16 E9/L (ref 0.05–0.5)
EOSINOPHILS RELATIVE PERCENT: 1.4 % (ref 0–6)
GFR AFRICAN AMERICAN: >60
GFR NON-AFRICAN AMERICAN: >60 ML/MIN/1.73
GLUCOSE BLD-MCNC: 135 MG/DL (ref 74–99)
GLUCOSE URINE: >=1000 MG/DL
HCT VFR BLD CALC: 49.9 % (ref 34–48)
HEMOGLOBIN: 16.3 G/DL (ref 11.5–15.5)
IMMATURE GRANULOCYTES #: 0.06 E9/L
IMMATURE GRANULOCYTES %: 0.5 % (ref 0–5)
KETONES, URINE: NEGATIVE MG/DL
LACTIC ACID: 1.4 MMOL/L (ref 0.5–2.2)
LEUKOCYTE ESTERASE, URINE: NEGATIVE
LIPASE: 127 U/L (ref 13–60)
LYMPHOCYTES ABSOLUTE: 1.51 E9/L (ref 1.5–4)
LYMPHOCYTES RELATIVE PERCENT: 13.6 % (ref 20–42)
MCH RBC QN AUTO: 26.9 PG (ref 26–35)
MCHC RBC AUTO-ENTMCNC: 32.7 % (ref 32–34.5)
MCV RBC AUTO: 82.2 FL (ref 80–99.9)
MONOCYTES ABSOLUTE: 0.67 E9/L (ref 0.1–0.95)
MONOCYTES RELATIVE PERCENT: 6 % (ref 2–12)
NEUTROPHILS ABSOLUTE: 8.63 E9/L (ref 1.8–7.3)
NEUTROPHILS RELATIVE PERCENT: 77.8 % (ref 43–80)
NITRITE, URINE: NEGATIVE
PDW BLD-RTO: 15.7 FL (ref 11.5–15)
PH UA: 5 (ref 5–9)
PLATELET # BLD: 129 E9/L (ref 130–450)
PMV BLD AUTO: 10.7 FL (ref 7–12)
POTASSIUM REFLEX MAGNESIUM: 4.3 MMOL/L (ref 3.5–5)
PROTEIN UA: NEGATIVE MG/DL
RBC # BLD: 6.07 E12/L (ref 3.5–5.5)
RBC UA: ABNORMAL /HPF (ref 0–2)
SODIUM BLD-SCNC: 138 MMOL/L (ref 132–146)
SPECIFIC GRAVITY UA: 1.01 (ref 1–1.03)
TOTAL PROTEIN: 7.3 G/DL (ref 6.4–8.3)
TROPONIN: <0.01 NG/ML (ref 0–0.03)
UROBILINOGEN, URINE: 0.2 E.U./DL
WBC # BLD: 11.1 E9/L (ref 4.5–11.5)
WBC UA: ABNORMAL /HPF (ref 0–5)

## 2020-02-20 PROCEDURE — 2580000003 HC RX 258: Performed by: EMERGENCY MEDICINE

## 2020-02-20 PROCEDURE — 83605 ASSAY OF LACTIC ACID: CPT

## 2020-02-20 PROCEDURE — 96372 THER/PROPH/DIAG INJ SC/IM: CPT

## 2020-02-20 PROCEDURE — 2500000003 HC RX 250 WO HCPCS: Performed by: EMERGENCY MEDICINE

## 2020-02-20 PROCEDURE — 96366 THER/PROPH/DIAG IV INF ADDON: CPT

## 2020-02-20 PROCEDURE — 84484 ASSAY OF TROPONIN QUANT: CPT

## 2020-02-20 PROCEDURE — 36415 COLL VENOUS BLD VENIPUNCTURE: CPT

## 2020-02-20 PROCEDURE — 96365 THER/PROPH/DIAG IV INF INIT: CPT

## 2020-02-20 PROCEDURE — 83690 ASSAY OF LIPASE: CPT

## 2020-02-20 PROCEDURE — 81001 URINALYSIS AUTO W/SCOPE: CPT

## 2020-02-20 PROCEDURE — 6360000002 HC RX W HCPCS: Performed by: EMERGENCY MEDICINE

## 2020-02-20 PROCEDURE — 93005 ELECTROCARDIOGRAM TRACING: CPT | Performed by: EMERGENCY MEDICINE

## 2020-02-20 PROCEDURE — 96375 TX/PRO/DX INJ NEW DRUG ADDON: CPT

## 2020-02-20 PROCEDURE — 6360000004 HC RX CONTRAST MEDICATION: Performed by: RADIOLOGY

## 2020-02-20 PROCEDURE — 74177 CT ABD & PELVIS W/CONTRAST: CPT

## 2020-02-20 PROCEDURE — 85025 COMPLETE CBC W/AUTO DIFF WBC: CPT

## 2020-02-20 PROCEDURE — 99284 EMERGENCY DEPT VISIT MOD MDM: CPT

## 2020-02-20 PROCEDURE — 80053 COMPREHEN METABOLIC PANEL: CPT

## 2020-02-20 RX ORDER — DICYCLOMINE HYDROCHLORIDE 10 MG/ML
20 INJECTION INTRAMUSCULAR ONCE
Status: COMPLETED | OUTPATIENT
Start: 2020-02-20 | End: 2020-02-20

## 2020-02-20 RX ORDER — METRONIDAZOLE 500 MG/1
500 TABLET ORAL 3 TIMES DAILY
Qty: 30 TABLET | Refills: 0 | Status: SHIPPED | OUTPATIENT
Start: 2020-02-20 | End: 2020-03-01

## 2020-02-20 RX ORDER — MORPHINE SULFATE 4 MG/ML
4 INJECTION, SOLUTION INTRAMUSCULAR; INTRAVENOUS ONCE
Status: COMPLETED | OUTPATIENT
Start: 2020-02-20 | End: 2020-02-20

## 2020-02-20 RX ORDER — 0.9 % SODIUM CHLORIDE 0.9 %
1000 INTRAVENOUS SOLUTION INTRAVENOUS ONCE
Status: COMPLETED | OUTPATIENT
Start: 2020-02-20 | End: 2020-02-20

## 2020-02-20 RX ORDER — HYDROCODONE BITARTRATE AND ACETAMINOPHEN 5; 325 MG/1; MG/1
1 TABLET ORAL EVERY 6 HOURS PRN
Qty: 12 TABLET | Refills: 0 | Status: SHIPPED | OUTPATIENT
Start: 2020-02-20 | End: 2020-02-23

## 2020-02-20 RX ORDER — CEFDINIR 300 MG/1
300 CAPSULE ORAL 2 TIMES DAILY
Qty: 20 CAPSULE | Refills: 0 | Status: SHIPPED | OUTPATIENT
Start: 2020-02-20 | End: 2020-03-01

## 2020-02-20 RX ADMIN — METRONIDAZOLE 500 MG: 500 INJECTION, SOLUTION INTRAVENOUS at 14:10

## 2020-02-20 RX ADMIN — DICYCLOMINE HYDROCHLORIDE 20 MG: 10 INJECTION INTRAMUSCULAR at 11:12

## 2020-02-20 RX ADMIN — CEFTRIAXONE SODIUM 1 G: 1 INJECTION, POWDER, FOR SOLUTION INTRAMUSCULAR; INTRAVENOUS at 14:05

## 2020-02-20 RX ADMIN — IOPAMIDOL 80 ML: 755 INJECTION, SOLUTION INTRAVENOUS at 10:47

## 2020-02-20 RX ADMIN — MORPHINE SULFATE 4 MG: 4 INJECTION, SOLUTION INTRAMUSCULAR; INTRAVENOUS at 08:57

## 2020-02-20 RX ADMIN — IOHEXOL 50 ML: 240 INJECTION, SOLUTION INTRATHECAL; INTRAVASCULAR; INTRAVENOUS; ORAL at 10:46

## 2020-02-20 RX ADMIN — SODIUM CHLORIDE 1000 ML: 9 INJECTION, SOLUTION INTRAVENOUS at 08:57

## 2020-02-20 ASSESSMENT — ENCOUNTER SYMPTOMS
EYE PAIN: 0
CONSTIPATION: 0
COUGH: 0
BLOOD IN STOOL: 0
WHEEZING: 0
VOMITING: 0
RHINORRHEA: 0
ABDOMINAL PAIN: 1
SHORTNESS OF BREATH: 0
ABDOMINAL DISTENTION: 0
EYE REDNESS: 0
SORE THROAT: 0
NAUSEA: 0
DIARRHEA: 0
BACK PAIN: 0

## 2020-02-20 ASSESSMENT — PAIN SCALES - GENERAL
PAINLEVEL_OUTOF10: 5
PAINLEVEL_OUTOF10: 7
PAINLEVEL_OUTOF10: 5
PAINLEVEL_OUTOF10: 3
PAINLEVEL_OUTOF10: 7

## 2020-02-20 ASSESSMENT — PAIN DESCRIPTION - PAIN TYPE: TYPE: ACUTE PAIN

## 2020-02-20 ASSESSMENT — PAIN DESCRIPTION - DESCRIPTORS: DESCRIPTORS: CRAMPING

## 2020-02-20 ASSESSMENT — PAIN DESCRIPTION - LOCATION
LOCATION: ABDOMEN
LOCATION: ABDOMEN

## 2020-02-20 ASSESSMENT — PAIN DESCRIPTION - ORIENTATION: ORIENTATION: LEFT;LOWER

## 2020-02-20 NOTE — CONSULTS
Department of Internal Medicine  IM Consultation Note    PCP: Dr. Krista Donahue   Consultants: Dr. Sebastian Collins:  Abdominal pain    HISTORY OF PRESENT ILLNESS:    Consuelo Frankel is a 64year old  female patient who presented to the emergency department with abdominal pain. Patient has extensive history of diverticulosis and has been having issues with her bowels recently. She has been tried on 2 different prescription laxatives by her gastroenterologist but was intolerant of them. She was recently started on daily prune juice which has been working well. She developed some slight cramping in the lower abdomen over the last week intermittently with constant left lower quadrant pain for the last 24 hours or so. She denied associated fevers, chills, fatigue or malaise. Appetite and intake of food and fluid has been normal.  She describes the pain is mild to moderate and worsened by touch or pressure to the area. Nothing is been tried for relief. She denies any change in bowel pattern, nausea vomiting, hematemesis, hematochezia or melena. He denies any associated urinary or genital complaints. No chest pains, no shortness of breath, no cough or phlegm production. PAST MEDICAL Hx:  Past Medical History:   Diagnosis Date    Diabetes mellitus ([facility] [address]     Hyperlipidemia        PAST SURGICAL Hx:   Past Surgical History:   Procedure Laterality Date    TONSILLECTOMY         FAMILY Hx:  History reviewed. No pertinent family history. HOME MEDICATIONS:  Prior to Admission medications    Medication Sig Start Date End Date Taking? Authorizing Provider   Dulaglutide (TRULICITY) 2.23 TJ/1.7FG SOPN Inject 0.75 mg into the skin once a week   Yes Historical Provider, MD   HYDROcodone-acetaminophen (NORCO) 5-325 MG per tablet Take 1 tablet by mouth every 6 hours as needed for Pain for up to 3 days. Intended supply: 3 days.  Take lowest dose possible to manage pain 2/20/20 2/23/20 Yes Sharon Romero, APRN - CNP on file     Attends Scientologist service: Not on file     Active member of club or organization: Not on file     Attends meetings of clubs or organizations: Not on file     Relationship status: Not on file    Intimate partner violence:     Fear of current or ex partner: Not on file     Emotionally abused: Not on file     Physically abused: Not on file     Forced sexual activity: Not on file   Other Topics Concern    Not on file   Social History Narrative    Not on file       ROS:  General:   Denies chills, fatigue, fever, malaise, night sweats or weight loss    Psychological:   Denies anxiety, disorientation or hallucinations    ENT:    Denies epistaxis, headaches, vertigo or visual changes    Cardiovascular:   Denies any chest pain, irregular heartbeats, or palpitations. No paroxysmal nocturnal dyspnea. Respiratory:   Denies shortness of breath, coughing, sputum production, hemoptysis, or wheezing. No orthopnea. Gastrointestinal:   Admits to abdominal discomfort as described above. Denies nausea, vomiting, bowel pattern changes or blood in stools. Genito-Urinary:    Denies any urgency, frequency, hematuria. Voiding without difficulty. Musculoskeletal:   Denies joint pain, joint stiffness, joint swelling or muscle pain    Neurology:    Denies any headache or focal neurological deficits. No weakness or paresthesia. Derm:    Denies any rashes, ulcers, or excoriations. Denies bruising. Extremities:   Denies any lower extremity swelling or edema. PHYSICAL EXAM:  VITALS:  Vitals:    02/20/20 1313   BP: (!) 101/51   Pulse: 96   Resp: 20   Temp: 97.6 °F (36.4 °C)   SpO2: 93%       CONSTITUTIONAL:    Awake, alert, cooperative, no apparent distress, and appears stated age    EYES:    PERRL, EOMI, sclera clear, conjunctiva normal    ENT:    Normocephalic, atraumatic, sinuses nontender on palpation. External ears without lesions. Oral pharynx with moist mucus membranes.   Tonsils without erythema or CMP:    Lab Results   Component Value Date     02/20/2020    K 4.3 02/20/2020     02/20/2020    CO2 22 02/20/2020    BUN 15 02/20/2020    CREATININE 0.6 02/20/2020    GFRAA >60 02/20/2020    LABGLOM >60 02/20/2020    GLUCOSE 135 02/20/2020    GLUCOSE 316 04/18/2011    PROT 7.3 02/20/2020    LABALBU 4.6 02/20/2020    LABALBU 4.2 04/18/2011    CALCIUM 9.6 02/20/2020    BILITOT 0.4 02/20/2020    ALKPHOS 95 02/20/2020    AST 14 02/20/2020    ALT 16 02/20/2020     U/A:    Lab Results   Component Value Date    COLORU Yellow 02/20/2020    PROTEINU Negative 02/20/2020    PHUR 5.0 02/20/2020    WBCUA NONE 02/20/2020    WBCUA 0-1 04/18/2011    RBCUA NONE 02/20/2020    RBCUA NONE 09/12/2013    BACTERIA NONE SEEN 02/20/2020    CLARITYU Clear 02/20/2020    SPECGRAV 1.010 02/20/2020    LEUKOCYTESUR Negative 02/20/2020    UROBILINOGEN 0.2 02/20/2020    BILIRUBINUR Negative 02/20/2020    BILIRUBINUR NEGATIVE 04/18/2011    BLOODU Negative 02/20/2020    GLUCOSEU >=1000 02/20/2020    GLUCOSEU 500 04/18/2011     LIPASE:    Lab Results   Component Value Date    LIPASE 127 02/20/2020       ASSESSMENT:  · Mild colonic diverticulitis with extensive diverticulosis  · Left lower quadrant abdominal pain  · Diabetes mellitus type 2 without long-term use of insulin  · Hyperlipidemia  · Chronic constipation    PLAN:  Marlo Kendall is a 51-year-old  female who presented with abdominal pain and initial examination was concerning for diverticulitis. She is without fever or leukocytosis. Vital signs are normal.  Blood pressure is did slightly decreased after the administration of narcotic IV pain medication but have remained stable. Clinically the patient appears well. CT scan shows minimal inflammatory changes suggestive of mild or early developing diverticulitis. She has improved after treatment rendered in the emergency department. She is tolerating foods and fluids well.   The remainder of her laboratory evaluation is unremarkable. I have discussed admission for observation and symptom management with brief IV antibiotics versus home today with oral pain medication and oral antibiotics. The patient would prefer to attempt to go home. She has established gastroenterology follow-up and her primary care provider offers walk-in follow-up appointments. I discussed that she will need close follow-up in the next 4 to 5 days with primary care provider. I have also discussed that she should reach out to her gastroenterologist office for follow-up within the next 1 to 2 weeks. She will maintain her bowel regimen as the narcotic pain medication may promote additional constipation. She will be transition to oral Omnicef and metronidazole. Caution is advised with metronidazole and alcohol use as it may make her very sick and this is written on the prescription as well. Norco for moderate to severe pain only with caution advised that may cause drowsiness or sedation, no alcohol or driving or heavy machinery operation while taking. The patient's significant other is present voices understanding as well. She is provided a work excuse for today, and she will be off work for the next 3 additional days. I have advised that she should maintain a low threshold for returning to the emergency department with any new, changing, worsening symptoms or concerns. I discussed the case with the attending physician Jose Luis Spencer as well as the emergency department physician.     DISPOSITION: To home with significant other    Follow up:   · Dr.Potocki in 4-5 days  · , call today for appointment in 1-2 weeks        LINDA Haley  1:39 PM  2/20/2020    Electronically signed by ABDIRIZAK Haley CNP on 2/20/20 at 1:39 PM

## 2020-02-20 NOTE — ED PROVIDER NOTES
well-developed. She is not toxic-appearing. HENT:      Head: Normocephalic and atraumatic. Nose: Nose normal. No congestion or rhinorrhea. Mouth/Throat:      Mouth: Mucous membranes are moist.      Pharynx: Oropharynx is clear. No oropharyngeal exudate or posterior oropharyngeal erythema. Eyes:      General: No scleral icterus. Left eye: No discharge. Conjunctiva/sclera: Conjunctivae normal.      Pupils: Pupils are equal, round, and reactive to light. Neck:      Musculoskeletal: Normal range of motion and neck supple. No neck rigidity. Cardiovascular:      Rate and Rhythm: Normal rate and regular rhythm. Pulses: Normal pulses. Heart sounds: Normal heart sounds. No murmur. Pulmonary:      Effort: Pulmonary effort is normal. No respiratory distress. Breath sounds: Normal breath sounds. No wheezing or rales. Abdominal:      General: Bowel sounds are normal.      Palpations: Abdomen is soft. Tenderness: There is abdominal tenderness in the left lower quadrant. There is no right CVA tenderness, left CVA tenderness, guarding or rebound. Musculoskeletal: Normal range of motion. Lymphadenopathy:      Cervical: No cervical adenopathy. Skin:     General: Skin is warm and dry. Capillary Refill: Capillary refill takes less than 2 seconds. Coloration: Skin is not jaundiced or pale. Findings: No bruising, erythema, lesion or rash. Neurological:      General: No focal deficit present. Mental Status: She is alert and oriented to person, place, and time. Psychiatric:         Mood and Affect: Mood normal.         Procedures    MDM  Number of Diagnoses or Management Options  Abdominal pain, left lower quadrant:   Acute diverticulitis:   Elevated lipase: Thrombocytopenia [facility]):   Diagnosis management comments: 62yo female, history, PE, labs and imaging is consistent with acute diverticulitis. Patient presented with LLQ abdominal pain.  Labs and imaging 129 (L) 130 - 450 E9/L    MPV 10.7 7.0 - 12.0 fL    Neutrophils % 77.8 43.0 - 80.0 %    Immature Granulocytes % 0.5 0.0 - 5.0 %    Lymphocytes % 13.6 (L) 20.0 - 42.0 %    Monocytes % 6.0 2.0 - 12.0 %    Eosinophils % 1.4 0.0 - 6.0 %    Basophils % 0.7 0.0 - 2.0 %    Neutrophils Absolute 8.63 (H) 1.80 - 7.30 E9/L    Immature Granulocytes # 0.06 E9/L    Lymphocytes Absolute 1.51 1.50 - 4.00 E9/L    Monocytes Absolute 0.67 0.10 - 0.95 E9/L    Eosinophils Absolute 0.16 0.05 - 0.50 E9/L    Basophils Absolute 0.08 0.00 - 0.20 E9/L   Comprehensive Metabolic Panel w/ Reflex to MG   Result Value Ref Range    Sodium 138 132 - 146 mmol/L    Potassium reflex Magnesium 4.3 3.5 - 5.0 mmol/L    Chloride 103 98 - 107 mmol/L    CO2 22 22 - 29 mmol/L    Anion Gap 13 7 - 16 mmol/L    Glucose 135 (H) 74 - 99 mg/dL    BUN 15 6 - 20 mg/dL    CREATININE 0.6 0.5 - 1.0 mg/dL    GFR Non-African American >60 >=60 mL/min/1.73    GFR African American >60     Calcium 9.6 8.6 - 10.2 mg/dL    Total Protein 7.3 6.4 - 8.3 g/dL    Alb 4.6 3.5 - 5.2 g/dL    Total Bilirubin 0.4 0.0 - 1.2 mg/dL    Alkaline Phosphatase 95 35 - 104 U/L    ALT 16 0 - 32 U/L    AST 14 0 - 31 U/L   Lipase   Result Value Ref Range    Lipase 127 (H) 13 - 60 U/L   Lactic Acid, Plasma   Result Value Ref Range    Lactic Acid 1.4 0.5 - 2.2 mmol/L   Troponin   Result Value Ref Range    Troponin <0.01 0.00 - 0.03 ng/mL   Urinalysis with Microscopic   Result Value Ref Range    Color, UA Yellow Straw/Yellow    Clarity, UA Clear Clear    Glucose, Ur >=1000 (A) Negative mg/dL    Bilirubin Urine Negative Negative    Ketones, Urine Negative Negative mg/dL    Specific Gravity, UA 1.010 1.005 - 1.030    Blood, Urine Negative Negative    pH, UA 5.0 5.0 - 9.0    Protein, UA Negative Negative mg/dL    Urobilinogen, Urine 0.2 <2.0 E.U./dL    Nitrite, Urine Negative Negative    Leukocyte Esterase, Urine Negative Negative    WBC, UA NONE 0 - 5 /HPF    RBC, UA NONE 0 - 2 /HPF    Bacteria, UA NONE Vitals for the past 24 hrs:   BP Temp Temp src Pulse Resp SpO2 Height Weight   02/20/20 1549 104/61 -- -- 81 16 95 % -- --   02/20/20 1408 100/62 -- -- 94 16 93 % -- --   02/20/20 1313 (!) 101/51 97.6 °F (36.4 °C) Oral 96 20 93 % -- --   02/20/20 1104 (!) 102/55 -- -- 94 20 92 % -- --   02/20/20 1013 109/61 98 °F (36.7 °C) Oral 84 16 93 % -- --   02/20/20 0933 113/64 97.9 °F (36.6 °C) Oral 96 16 93 % -- --   02/20/20 0822 -- -- Oral 90 -- -- -- --   02/20/20 0757 123/68 98.4 °F (36.9 °C) Oral 102 18 96 % 5' 4\" (1.626 m) 163 lb (73.9 kg)       Oxygen Saturation Interpretation: Normal      ------------------------------------------ PROGRESS NOTES ------------------------------------------  Re-evaluation(s):       1:00 PM  Re-evaluated the patient. Discussed results of blood work and imaging. She continues to have LLQ pain. 1:10 PM  Discussed case with Darrell Cesar for Dr. Kay Ding who is present in the ED and will evaluate the patient. I have spoken with the patient and discussed todays results, in addition to providing specific details for the plan of care and counseling regarding the diagnosis and prognosis. Their questions are answered at this time and they are agreeable with the plan.      --------------------------------- ADDITIONAL PROVIDER NOTES ---------------------------------  Consultations:  Spoke with Darrell Cesar for Dr. Kay Ding who will evaluate the patient in the ED. This patient's ED course included: a personal history and physicial examination, re-evaluation prior to disposition, multiple bedside re-evaluations, IV medications, continuous pulse oximetry and complex medical decision making and emergency management    This patient has remained hemodynamically stable during their ED course. Please note that the withdrawal or failure to initiate urgent interventions for this patient would likely result in a life threatening deterioration or permanent disability.       Accordingly this patient received 0 minutes of critical care time, excluding separately billable procedures. Clinical Impression  1. Acute diverticulitis    2. Abdominal pain, left lower quadrant    3. Thrombocytopenia (HCC)    4. Elevated lipase          Disposition  Patient's disposition: Admit to med/surg floor  Patient's condition is stable.        Henok Castaneda DO  02/24/20 1247

## 2020-02-20 NOTE — ED NOTES
The patient says she has had LLQ pain x 1 week. The pain got worse at 0200 today. She denies nausea, vomiting, fevers, dysuria, diarrhea.  She says she has a hx of diverticulosis     Trent Quesada RN  02/20/20 9083

## 2020-02-20 NOTE — ED NOTES
The pt was instructed on need for urine specimen.  She is drinking the contrast for CT     Barbara Steele RN  02/20/20 8651

## 2020-10-17 ENCOUNTER — HOSPITAL ENCOUNTER (EMERGENCY)
Age: 60
Discharge: HOME | End: 2020-10-17
Attending: EMERGENCY MEDICINE
Payer: MEDICAID

## 2020-10-17 ENCOUNTER — APPOINTMENT (OUTPATIENT)
Dept: CT IMAGING | Age: 60
End: 2020-10-17
Payer: MEDICAID

## 2020-10-17 VITALS
BODY MASS INDEX: 27.66 KG/M2 | DIASTOLIC BLOOD PRESSURE: 65 MMHG | TEMPERATURE: 97.1 F | RESPIRATION RATE: 14 BRPM | WEIGHT: 162 LBS | HEIGHT: 64 IN | OXYGEN SATURATION: 93 % | SYSTOLIC BLOOD PRESSURE: 114 MMHG | HEART RATE: 87 BPM

## 2020-10-17 LAB
ALBUMIN SERPL-MCNC: 4.3 G/DL (ref 3.5–5.2)
ALP BLD-CCNC: 99 U/L (ref 35–104)
ALT SERPL-CCNC: 16 U/L (ref 0–32)
ANION GAP SERPL CALCULATED.3IONS-SCNC: 11 MMOL/L (ref 7–16)
AST SERPL-CCNC: 14 U/L (ref 0–31)
BASOPHILS ABSOLUTE: 0.07 E9/L (ref 0–0.2)
BASOPHILS RELATIVE PERCENT: 0.9 % (ref 0–2)
BILIRUB SERPL-MCNC: 0.2 MG/DL (ref 0–1.2)
BILIRUBIN URINE: NEGATIVE
BLOOD, URINE: NEGATIVE
BUN BLDV-MCNC: 15 MG/DL (ref 6–20)
CALCIUM SERPL-MCNC: 9.2 MG/DL (ref 8.6–10.2)
CHLORIDE BLD-SCNC: 106 MMOL/L (ref 98–107)
CLARITY: CLEAR
CO2: 21 MMOL/L (ref 22–29)
COLOR: YELLOW
CREAT SERPL-MCNC: 0.6 MG/DL (ref 0.5–1)
EOSINOPHILS ABSOLUTE: 0.19 E9/L (ref 0.05–0.5)
EOSINOPHILS RELATIVE PERCENT: 2.5 % (ref 0–6)
GFR AFRICAN AMERICAN: >60
GFR NON-AFRICAN AMERICAN: >60 ML/MIN/1.73
GLUCOSE BLD-MCNC: 166 MG/DL (ref 74–99)
GLUCOSE URINE: >=1000 MG/DL
HCT VFR BLD CALC: 50.2 % (ref 34–48)
HEMOGLOBIN: 15.9 G/DL (ref 11.5–15.5)
IMMATURE GRANULOCYTES #: 0.04 E9/L
IMMATURE GRANULOCYTES %: 0.5 % (ref 0–5)
KETONES, URINE: NEGATIVE MG/DL
LEUKOCYTE ESTERASE, URINE: NEGATIVE
LIPASE: 67 U/L (ref 13–60)
LYMPHOCYTES ABSOLUTE: 1.21 E9/L (ref 1.5–4)
LYMPHOCYTES RELATIVE PERCENT: 15.8 % (ref 20–42)
MCH RBC QN AUTO: 26.9 PG (ref 26–35)
MCHC RBC AUTO-ENTMCNC: 31.7 % (ref 32–34.5)
MCV RBC AUTO: 84.9 FL (ref 80–99.9)
MONOCYTES ABSOLUTE: 0.41 E9/L (ref 0.1–0.95)
MONOCYTES RELATIVE PERCENT: 5.3 % (ref 2–12)
NEUTROPHILS ABSOLUTE: 5.75 E9/L (ref 1.8–7.3)
NEUTROPHILS RELATIVE PERCENT: 75 % (ref 43–80)
NITRITE, URINE: NEGATIVE
PDW BLD-RTO: 15 FL (ref 11.5–15)
PH UA: 5 (ref 5–9)
PLATELET # BLD: 115 E9/L (ref 130–450)
PMV BLD AUTO: 10.5 FL (ref 7–12)
POTASSIUM REFLEX MAGNESIUM: 4.4 MMOL/L (ref 3.5–5)
PROTEIN UA: NEGATIVE MG/DL
RBC # BLD: 5.91 E12/L (ref 3.5–5.5)
SODIUM BLD-SCNC: 138 MMOL/L (ref 132–146)
SPECIFIC GRAVITY UA: 1.02 (ref 1–1.03)
TOTAL PROTEIN: 7.1 G/DL (ref 6.4–8.3)
UROBILINOGEN, URINE: 0.2 E.U./DL
WBC # BLD: 7.7 E9/L (ref 4.5–11.5)

## 2020-10-17 PROCEDURE — 83690 ASSAY OF LIPASE: CPT

## 2020-10-17 PROCEDURE — 2580000003 HC RX 258: Performed by: STUDENT IN AN ORGANIZED HEALTH CARE EDUCATION/TRAINING PROGRAM

## 2020-10-17 PROCEDURE — 85025 COMPLETE CBC W/AUTO DIFF WBC: CPT

## 2020-10-17 PROCEDURE — 96374 THER/PROPH/DIAG INJ IV PUSH: CPT

## 2020-10-17 PROCEDURE — 81003 URINALYSIS AUTO W/O SCOPE: CPT

## 2020-10-17 PROCEDURE — 6360000002 HC RX W HCPCS: Performed by: STUDENT IN AN ORGANIZED HEALTH CARE EDUCATION/TRAINING PROGRAM

## 2020-10-17 PROCEDURE — 80053 COMPREHEN METABOLIC PANEL: CPT

## 2020-10-17 PROCEDURE — 99285 EMERGENCY DEPT VISIT HI MDM: CPT

## 2020-10-17 PROCEDURE — 6360000004 HC RX CONTRAST MEDICATION: Performed by: RADIOLOGY

## 2020-10-17 PROCEDURE — 96375 TX/PRO/DX INJ NEW DRUG ADDON: CPT

## 2020-10-17 PROCEDURE — 99284 EMERGENCY DEPT VISIT MOD MDM: CPT

## 2020-10-17 PROCEDURE — 74177 CT ABD & PELVIS W/CONTRAST: CPT

## 2020-10-17 RX ORDER — ONDANSETRON 4 MG/1
4 TABLET, FILM COATED ORAL 3 TIMES DAILY PRN
Qty: 15 TABLET | Refills: 0 | Status: SHIPPED | OUTPATIENT
Start: 2020-10-17 | End: 2021-06-30 | Stop reason: ALTCHOICE

## 2020-10-17 RX ORDER — ERTUGLIFLOZIN 15 MG/1
15 TABLET, FILM COATED ORAL
COMMUNITY

## 2020-10-17 RX ORDER — LINACLOTIDE 290 UG/1
145 CAPSULE, GELATIN COATED ORAL
COMMUNITY

## 2020-10-17 RX ORDER — 0.9 % SODIUM CHLORIDE 0.9 %
1000 INTRAVENOUS SOLUTION INTRAVENOUS ONCE
Status: COMPLETED | OUTPATIENT
Start: 2020-10-17 | End: 2020-10-17

## 2020-10-17 RX ORDER — CEFDINIR 300 MG/1
300 CAPSULE ORAL 2 TIMES DAILY
Qty: 20 CAPSULE | Refills: 0 | Status: SHIPPED | OUTPATIENT
Start: 2020-10-17 | End: 2020-10-27

## 2020-10-17 RX ORDER — ONDANSETRON 2 MG/ML
4 INJECTION INTRAMUSCULAR; INTRAVENOUS ONCE
Status: COMPLETED | OUTPATIENT
Start: 2020-10-17 | End: 2020-10-17

## 2020-10-17 RX ORDER — FENTANYL CITRATE 50 UG/ML
50 INJECTION, SOLUTION INTRAMUSCULAR; INTRAVENOUS ONCE
Status: COMPLETED | OUTPATIENT
Start: 2020-10-17 | End: 2020-10-17

## 2020-10-17 RX ORDER — METRONIDAZOLE 500 MG/1
500 TABLET ORAL 3 TIMES DAILY
Qty: 30 TABLET | Refills: 0 | Status: SHIPPED | OUTPATIENT
Start: 2020-10-17 | End: 2020-10-27

## 2020-10-17 RX ADMIN — FENTANYL CITRATE 50 MCG: 50 INJECTION, SOLUTION INTRAMUSCULAR; INTRAVENOUS at 06:15

## 2020-10-17 RX ADMIN — ONDANSETRON 4 MG: 2 INJECTION INTRAMUSCULAR; INTRAVENOUS at 06:15

## 2020-10-17 RX ADMIN — IOPAMIDOL 75 ML: 755 INJECTION, SOLUTION INTRAVENOUS at 07:21

## 2020-10-17 RX ADMIN — SODIUM CHLORIDE 1000 ML: 9 INJECTION, SOLUTION INTRAVENOUS at 06:15

## 2020-10-17 ASSESSMENT — PAIN DESCRIPTION - LOCATION
LOCATION: ABDOMEN
LOCATION: ABDOMEN

## 2020-10-17 ASSESSMENT — ENCOUNTER SYMPTOMS
SORE THROAT: 0
CHEST TIGHTNESS: 0
RHINORRHEA: 0
BACK PAIN: 0
DIARRHEA: 1
PHOTOPHOBIA: 0
NAUSEA: 1
SHORTNESS OF BREATH: 0
COUGH: 0
WHEEZING: 0
ABDOMINAL PAIN: 1
VOMITING: 0
TROUBLE SWALLOWING: 0
APNEA: 0
EYE PAIN: 0
CONSTIPATION: 0

## 2020-10-17 ASSESSMENT — PAIN DESCRIPTION - FREQUENCY: FREQUENCY: CONTINUOUS

## 2020-10-17 ASSESSMENT — PAIN DESCRIPTION - ORIENTATION: ORIENTATION: LEFT

## 2020-10-17 ASSESSMENT — PAIN SCALES - GENERAL
PAINLEVEL_OUTOF10: 5
PAINLEVEL_OUTOF10: 2

## 2020-10-17 ASSESSMENT — PAIN DESCRIPTION - PAIN TYPE: TYPE: ACUTE PAIN

## 2020-10-17 NOTE — ED PROVIDER NOTES
[address] ENCOUNTER      Pt Name: Ben Whittington  MRN: [id number]  [place] 1960  Date of evaluation: 10/17/2020      CHIEF COMPLAINT       Chief Complaint   Patient presents with    Abdominal Pain     bilateral lower abdominal pain that started around 0400        HPI  Ben Whittington is a 64 y.o. female with history of diabetes, previous history of diverticulitis who presents to the emergency department with complaints of lower abdominal pain. The patient states that around 4 AM this morning she developed diffuse lower abdominal pain. The pain became more severe in the left lower quadrant. She describes the pain as moderate, achy. Nothing makes the pain better or worse. The pain does not radiate. She states this feels like when she had diverticulitis in the past.  She states yesterday she had several episodes of diarrhea. Currently she has mild nausea. She denies any vomiting, fevers, chills, chest pain or shortness of breath. No black or bloody stools. No history of any abdominal surgeries. Except as noted above the remainder of the review of systems was reviewed and negative. Review of Systems   Constitutional: Negative for chills, diaphoresis, fatigue and fever. HENT: Negative for rhinorrhea, sore throat and trouble swallowing. Eyes: Negative for photophobia and pain. Respiratory: Negative for apnea, cough, chest tightness, shortness of breath and wheezing. Cardiovascular: Negative for chest pain, palpitations and leg swelling. Gastrointestinal: Positive for abdominal pain, diarrhea and nausea. Negative for constipation and vomiting. Endocrine: Negative for polyuria. Genitourinary: Negative for difficulty urinating and dysuria. Musculoskeletal: Negative for back pain, neck pain and neck stiffness. Skin: Negative for pallor and rash.    Neurological: Negative for dizziness, speech difficulty, weakness, light-headedness and headaches. Psychiatric/Behavioral: Negative for confusion. The patient is not nervous/anxious. Physical Exam  Vitals signs and nursing note reviewed. Constitutional:       General: She is not in acute distress. Appearance: She is well-developed. Comments: Awake and alert. Sitting in the gurney in no obvious distress. HENT:      Head: Normocephalic and atraumatic. Right Ear: External ear normal.      Left Ear: External ear normal.      Mouth/Throat:      Mouth: Mucous membranes are moist.   Eyes:      General: No scleral icterus. Pupils: Pupils are equal, round, and reactive to light. Neck:      Musculoskeletal: Normal range of motion and neck supple. Cardiovascular:      Rate and Rhythm: Normal rate and regular rhythm. Heart sounds: No murmur. Comments: 2+ radial and dorsal pedis pulses bilaterally  Pulmonary:      Effort: Pulmonary effort is normal. No respiratory distress. Breath sounds: Normal breath sounds. No wheezing. Abdominal:      Palpations: Abdomen is soft. Tenderness: There is abdominal tenderness. There is no guarding or rebound. Comments: Abdomen soft. Diffuse lower abdominal tenderness worse in the left lower quadrant. No rebound or guarding. Musculoskeletal: Normal range of motion. General: No tenderness or deformity. Right lower leg: No edema. Left lower leg: No edema. Skin:     General: Skin is warm and dry. Capillary Refill: Capillary refill takes less than 2 seconds. Neurological:      General: No focal deficit present. Mental Status: She is alert and oriented to person, place, and time. Cranial Nerves: No cranial nerve deficit. Sensory: No sensory deficit. Motor: No weakness or abnormal muscle tone.    Psychiatric:         Mood and Affect: Mood normal.         Behavior: Behavior normal.          Procedures     MDM   This is a 64year old female with a hx of diverticulitis, diabetes, hyperlipidemia who presents to the emergency department with upper quadrant abdominal pain and diarrhea. Patient is awake alert, hemodynamic stable, afebrile and in no respiratory distress. Abdomen soft with left lower quadrant abdominal tenderness. No rebound or guarding, nonsurgical benign abdomen. CT abdomen pelvis showed severe diffuse diverticulosis. No clear diverticulitis on CT. Symptoms clinically seem more consistent with diverticulitis and given her left lower quadrant abdominal tenderness and history of diverticulitis in the context of severe diverticulosis will initiate treatment for clinical suspicion of diverticulitis. Metabolic panel showed normal electrolytes, normal renal function with creatinine 0.6. Lipase was not consistent with acute pancreatitis. She leukocytosis. She is well-appearing. She felt much better after supportive therapy in the emergency department. Discussed with patient plan for close outpatient follow-up with the patient's PCP as well as return precautions and the patient understands and agrees to the plan. ED Course as of Oct 17 0840   Sat Oct 17, 2020   0840 Discussed imaging results. Discussed plan for discharge home on antibiotics. She been much better after supportive therapy. [LM]      ED Course User Index  [LM] Krzysztof Goodwin DO           ED Course as of Oct 17 0840   Sat Oct 17, 2020   0840 Discussed imaging results. Discussed plan for discharge home on antibiotics. She been much better after supportive therapy. [LM]      ED Course User Index  [LM] Krzysztof Goodwin DO       --------------------------------------------- PAST HISTORY ---------------------------------------------  Past Medical History:  has a past medical history of Diabetes mellitus ([address] and Hyperlipidemia. Past Surgical History:  has a past surgical history that includes Tonsillectomy. Social History:  reports that she has been smoking cigarettes. She has been smoking about 1.00 pack per day. She has never used smokeless tobacco. She reports current alcohol use. She reports that she does not use drugs. Family History: family history is not on file. The patients home medications have been reviewed. Allergies: Patient has no known allergies.     -------------------------------------------------- RESULTS -------------------------------------------------  Labs:  Results for orders placed or performed during the hospital encounter of 10/17/20   CBC Auto Differential   Result Value Ref Range    WBC 7.7 4.5 - 11.5 E9/L    RBC 5.91 (H) 3.50 - 5.50 E12/L    Hemoglobin 15.9 (H) 11.5 - 15.5 g/dL    Hematocrit 50.2 (H) 34.0 - 48.0 %    MCV 84.9 80.0 - 99.9 fL    MCH 26.9 26.0 - 35.0 pg    MCHC 31.7 (L) 32.0 - 34.5 %    RDW 15.0 11.5 - 15.0 fL    Platelets 724 (L) 225 - 450 E9/L    MPV 10.5 7.0 - 12.0 fL    Neutrophils % 75.0 43.0 - 80.0 %    Immature Granulocytes % 0.5 0.0 - 5.0 %    Lymphocytes % 15.8 (L) 20.0 - 42.0 %    Monocytes % 5.3 2.0 - 12.0 %    Eosinophils % 2.5 0.0 - 6.0 %    Basophils % 0.9 0.0 - 2.0 %    Neutrophils Absolute 5.75 1.80 - 7.30 E9/L    Immature Granulocytes # 0.04 E9/L    Lymphocytes Absolute 1.21 (L) 1.50 - 4.00 E9/L    Monocytes Absolute 0.41 0.10 - 0.95 E9/L    Eosinophils Absolute 0.19 0.05 - 0.50 E9/L    Basophils Absolute 0.07 0.00 - 0.20 E9/L   Comprehensive Metabolic Panel w/ Reflex to MG   Result Value Ref Range    Sodium 138 132 - 146 mmol/L    Potassium reflex Magnesium 4.4 3.5 - 5.0 mmol/L    Chloride 106 98 - 107 mmol/L    CO2 21 (L) 22 - 29 mmol/L    Anion Gap 11 7 - 16 mmol/L    Glucose 166 (H) 74 - 99 mg/dL    BUN 15 6 - 20 mg/dL    CREATININE 0.6 0.5 - 1.0 mg/dL    GFR Non-African American >60 >=60 mL/min/1.73    GFR African American >60     Calcium 9.2 8.6 - 10.2 mg/dL    Total Protein 7.1 6.4 - 8.3 g/dL    Alb 4.3 3.5 - 5.2 g/dL    Total Bilirubin 0.2 0.0 - 1.2 mg/dL    Alkaline Phosphatase 99 35 - 104 U/L    ALT 16 ---------------------------------  At this time the patient is without objective evidence of an acute process requiring hospitalization or inpatient management. They have remained hemodynamically stable throughout their entire ED visit and are stable for discharge with outpatient follow-up. The plan has been discussed in detail and they are aware of the specific conditions for emergent return, as well as the importance of follow-up. Discharge Medication List as of 10/17/2020  8:34 AM      START taking these medications    Details   metroNIDAZOLE (FLAGYL) 500 MG tablet Take 1 tablet by mouth 3 times daily for 10 days, Disp-30 tablet,R-0Print      cefdinir (OMNICEF) 300 MG capsule Take 1 capsule by mouth 2 times daily for 10 days, Disp-20 capsule,R-0Print      ondansetron (ZOFRAN) 4 MG tablet Take 1 tablet by mouth 3 times daily as needed for Nausea or Vomiting, Disp-15 tablet,R-0Print             Diagnosis:  1. Diverticulitis of colon    2. Diarrhea, unspecified type    3. Abdominal pain, left lower quadrant        Disposition:  Patient's disposition: Discharge to home  Patient's condition is stable. Lara Tamayo DO  Resident  10/17/20 2033      ATTENDING PROVIDER ATTESTATION:     Kurtis Cyr presented to the emergency department for evaluation of Abdominal Pain (bilateral lower abdominal pain that started around 0400)   and was initially evaluated by the Medical Resident. See Original ED Note for H&P and ED course above. I have reviewed and discussed the case, including pertinent history (medical, surgical, family and social) and exam findings with the Medical Resident assigned to Kurtis Cyr. I have personally performed and/or participated in the history, exam, medical decision making, and procedures and agree with all pertinent clinical information.      I, Dr. Charlotte Salazar, am the primary provider of record       I have reviewed my findings and recommendations with the assigned Medical

## 2020-10-17 NOTE — ED NOTES
Patient states she has a history of diverticulitis and she tried a new restaurant last night- ever since she has been feeling ill, slight diarrhea yesterday after eating. Patient states she got up to let dog out this am around 4 and that is when she began have abdominal pain. Will continue to monitor.       Siddharth Fontaine RN  10/17/20 1622

## 2020-11-04 ENCOUNTER — HOSPITAL ENCOUNTER (OUTPATIENT)
Dept: MAMMOGRAPHY | Age: 60
Discharge: HOME | End: 2020-11-06
Payer: MEDICAID

## 2020-11-04 PROCEDURE — 77063 BREAST TOMOSYNTHESIS BI: CPT

## 2021-03-01 ENCOUNTER — HOSPITAL ENCOUNTER (OUTPATIENT)
Age: 61
Discharge: HOME | End: 2021-03-03
Payer: MEDICAID

## 2021-03-01 ENCOUNTER — HOSPITAL ENCOUNTER (OUTPATIENT)
Age: 61
Discharge: HOME | End: 2021-03-01
Payer: MEDICAID

## 2021-03-01 ENCOUNTER — HOSPITAL ENCOUNTER (OUTPATIENT)
Dept: GENERAL RADIOLOGY | Age: 61
Discharge: HOME | End: 2021-03-03
Payer: MEDICAID

## 2021-03-01 DIAGNOSIS — J32.9 CHRONIC SINUSITIS, UNSPECIFIED LOCATION: ICD-10-CM

## 2021-03-01 LAB
ALBUMIN SERPL-MCNC: 4.4 G/DL (ref 3.5–5.2)
ALP BLD-CCNC: 96 U/L (ref 35–104)
ALT SERPL-CCNC: 18 U/L (ref 0–32)
ANION GAP SERPL CALCULATED.3IONS-SCNC: 8 MMOL/L (ref 7–16)
AST SERPL-CCNC: 14 U/L (ref 0–31)
BASOPHILS ABSOLUTE: 0.09 E9/L (ref 0–0.2)
BASOPHILS RELATIVE PERCENT: 1.1 % (ref 0–2)
BILIRUB SERPL-MCNC: 0.4 MG/DL (ref 0–1.2)
BILIRUBIN URINE: NEGATIVE
BLOOD, URINE: NEGATIVE
BUN BLDV-MCNC: 16 MG/DL (ref 6–20)
CALCIUM SERPL-MCNC: 9.1 MG/DL (ref 8.6–10.2)
CHLORIDE BLD-SCNC: 107 MMOL/L (ref 98–107)
CHOLESTEROL, TOTAL: 82 MG/DL (ref 0–199)
CLARITY: CLEAR
CO2: 25 MMOL/L (ref 22–29)
COLOR: YELLOW
CREAT SERPL-MCNC: 0.7 MG/DL (ref 0.5–1)
EOSINOPHILS ABSOLUTE: 0.21 E9/L (ref 0.05–0.5)
EOSINOPHILS RELATIVE PERCENT: 2.7 % (ref 0–6)
GFR AFRICAN AMERICAN: >60
GFR NON-AFRICAN AMERICAN: >60 ML/MIN/1.73
GLUCOSE BLD-MCNC: 95 MG/DL (ref 74–99)
GLUCOSE URINE: >=1000 MG/DL
HCT VFR BLD CALC: 50.1 % (ref 34–48)
HDLC SERPL-MCNC: 22 MG/DL
HEMOGLOBIN: 15.9 G/DL (ref 11.5–15.5)
IMMATURE GRANULOCYTES #: 0.04 E9/L
IMMATURE GRANULOCYTES %: 0.5 % (ref 0–5)
KETONES, URINE: NEGATIVE MG/DL
LDL CHOLESTEROL CALCULATED: 16 MG/DL (ref 0–99)
LEUKOCYTE ESTERASE, URINE: NEGATIVE
LYMPHOCYTES ABSOLUTE: 1.43 E9/L (ref 1.5–4)
LYMPHOCYTES RELATIVE PERCENT: 18.2 % (ref 20–42)
MCH RBC QN AUTO: 27 PG (ref 26–35)
MCHC RBC AUTO-ENTMCNC: 31.7 % (ref 32–34.5)
MCV RBC AUTO: 85.2 FL (ref 80–99.9)
MONOCYTES ABSOLUTE: 0.44 E9/L (ref 0.1–0.95)
MONOCYTES RELATIVE PERCENT: 5.6 % (ref 2–12)
NEUTROPHILS ABSOLUTE: 5.63 E9/L (ref 1.8–7.3)
NEUTROPHILS RELATIVE PERCENT: 71.9 % (ref 43–80)
NITRITE, URINE: NEGATIVE
PDW BLD-RTO: 15.1 FL (ref 11.5–15)
PH UA: 5 (ref 5–9)
PLATELET # BLD: 118 E9/L (ref 130–450)
PMV BLD AUTO: 10.9 FL (ref 7–12)
POTASSIUM SERPL-SCNC: 5.2 MMOL/L (ref 3.5–5)
PROTEIN UA: NEGATIVE MG/DL
RBC # BLD: 5.88 E12/L (ref 3.5–5.5)
SODIUM BLD-SCNC: 140 MMOL/L (ref 132–146)
SPECIFIC GRAVITY UA: >=1.03 (ref 1–1.03)
T4 TOTAL: 5.8 MCG/DL (ref 4.5–11.7)
TOTAL PROTEIN: 6.7 G/DL (ref 6.4–8.3)
TRIGL SERPL-MCNC: 222 MG/DL (ref 0–149)
TSH SERPL DL<=0.05 MIU/L-ACNC: 1.72 UIU/ML (ref 0.27–4.2)
UROBILINOGEN, URINE: 0.2 E.U./DL
VLDLC SERPL CALC-MCNC: 44 MG/DL
WBC # BLD: 7.8 E9/L (ref 4.5–11.5)

## 2021-03-01 PROCEDURE — 80061 LIPID PANEL: CPT

## 2021-03-01 PROCEDURE — 36415 COLL VENOUS BLD VENIPUNCTURE: CPT

## 2021-03-01 PROCEDURE — 85025 COMPLETE CBC W/AUTO DIFF WBC: CPT

## 2021-03-01 PROCEDURE — 81003 URINALYSIS AUTO W/O SCOPE: CPT

## 2021-03-01 PROCEDURE — 84443 ASSAY THYROID STIM HORMONE: CPT

## 2021-03-01 PROCEDURE — 80053 COMPREHEN METABOLIC PANEL: CPT

## 2021-03-01 PROCEDURE — 70220 X-RAY EXAM OF SINUSES: CPT

## 2021-03-01 PROCEDURE — 84436 ASSAY OF TOTAL THYROXINE: CPT

## 2021-03-10 ENCOUNTER — HOSPITAL ENCOUNTER (OUTPATIENT)
Dept: INTERVENTIONAL RADIOLOGY/VASCULAR | Age: 61
Discharge: HOME | End: 2021-03-12
Payer: MEDICAID

## 2021-03-10 DIAGNOSIS — R42 VERTIGO: ICD-10-CM

## 2021-03-10 PROCEDURE — 93880 EXTRACRANIAL BILAT STUDY: CPT

## 2021-03-22 ENCOUNTER — HOSPITAL ENCOUNTER (EMERGENCY)
Age: 61
Discharge: HOME | End: 2021-03-23
Attending: EMERGENCY MEDICINE
Payer: MEDICAID

## 2021-03-22 ENCOUNTER — APPOINTMENT (OUTPATIENT)
Dept: CT IMAGING | Age: 61
End: 2021-03-22
Payer: MEDICAID

## 2021-03-22 VITALS
DIASTOLIC BLOOD PRESSURE: 60 MMHG | TEMPERATURE: 97.8 F | OXYGEN SATURATION: 96 % | HEART RATE: 83 BPM | SYSTOLIC BLOOD PRESSURE: 108 MMHG | RESPIRATION RATE: 16 BRPM

## 2021-03-22 DIAGNOSIS — K57.32 DIVERTICULITIS OF COLON: Primary | ICD-10-CM

## 2021-03-22 LAB
ALBUMIN SERPL-MCNC: 4.4 G/DL (ref 3.5–5.2)
ALP BLD-CCNC: 90 U/L (ref 35–104)
ALT SERPL-CCNC: 18 U/L (ref 0–32)
ANION GAP SERPL CALCULATED.3IONS-SCNC: 10 MMOL/L (ref 7–16)
AST SERPL-CCNC: 15 U/L (ref 0–31)
BASOPHILS ABSOLUTE: 0.09 E9/L (ref 0–0.2)
BASOPHILS RELATIVE PERCENT: 1 % (ref 0–2)
BILIRUB SERPL-MCNC: 0.3 MG/DL (ref 0–1.2)
BILIRUBIN URINE: NEGATIVE
BLOOD, URINE: NEGATIVE
BUN BLDV-MCNC: 20 MG/DL (ref 6–20)
CALCIUM SERPL-MCNC: 9.5 MG/DL (ref 8.6–10.2)
CHLORIDE BLD-SCNC: 104 MMOL/L (ref 98–107)
CLARITY: CLEAR
CO2: 27 MMOL/L (ref 22–29)
COLOR: YELLOW
CREAT SERPL-MCNC: 0.8 MG/DL (ref 0.5–1)
EOSINOPHILS ABSOLUTE: 0.23 E9/L (ref 0.05–0.5)
EOSINOPHILS RELATIVE PERCENT: 2.5 % (ref 0–6)
GFR AFRICAN AMERICAN: >60
GFR NON-AFRICAN AMERICAN: >60 ML/MIN/1.73
GLUCOSE BLD-MCNC: 63 MG/DL (ref 74–99)
GLUCOSE URINE: >=1000 MG/DL
HCT VFR BLD CALC: 47.1 % (ref 34–48)
HEMOGLOBIN: 14.7 G/DL (ref 11.5–15.5)
IMMATURE GRANULOCYTES #: 0.03 E9/L
IMMATURE GRANULOCYTES %: 0.3 % (ref 0–5)
KETONES, URINE: NEGATIVE MG/DL
LACTIC ACID: 3.2 MMOL/L (ref 0.5–2.2)
LEUKOCYTE ESTERASE, URINE: NEGATIVE
LIPASE: 99 U/L (ref 13–60)
LYMPHOCYTES ABSOLUTE: 2.5 E9/L (ref 1.5–4)
LYMPHOCYTES RELATIVE PERCENT: 27.2 % (ref 20–42)
MCH RBC QN AUTO: 26.9 PG (ref 26–35)
MCHC RBC AUTO-ENTMCNC: 31.2 % (ref 32–34.5)
MCV RBC AUTO: 86.1 FL (ref 80–99.9)
MONOCYTES ABSOLUTE: 0.61 E9/L (ref 0.1–0.95)
MONOCYTES RELATIVE PERCENT: 6.6 % (ref 2–12)
NEUTROPHILS ABSOLUTE: 5.73 E9/L (ref 1.8–7.3)
NEUTROPHILS RELATIVE PERCENT: 62.4 % (ref 43–80)
NITRITE, URINE: NEGATIVE
PDW BLD-RTO: 15 FL (ref 11.5–15)
PH UA: 5 (ref 5–9)
PLATELET # BLD: 115 E9/L (ref 130–450)
PMV BLD AUTO: 11.8 FL (ref 7–12)
POTASSIUM SERPL-SCNC: 4.7 MMOL/L (ref 3.5–5)
PROTEIN UA: NEGATIVE MG/DL
RBC # BLD: 5.47 E12/L (ref 3.5–5.5)
SODIUM BLD-SCNC: 141 MMOL/L (ref 132–146)
SPECIFIC GRAVITY UA: >=1.03 (ref 1–1.03)
TOTAL PROTEIN: 6.9 G/DL (ref 6.4–8.3)
UROBILINOGEN, URINE: 0.2 E.U./DL
WBC # BLD: 9.2 E9/L (ref 4.5–11.5)

## 2021-03-22 PROCEDURE — 96375 TX/PRO/DX INJ NEW DRUG ADDON: CPT

## 2021-03-22 PROCEDURE — 6370000000 HC RX 637 (ALT 250 FOR IP): Performed by: EMERGENCY MEDICINE

## 2021-03-22 PROCEDURE — 74177 CT ABD & PELVIS W/CONTRAST: CPT

## 2021-03-22 PROCEDURE — 81003 URINALYSIS AUTO W/O SCOPE: CPT

## 2021-03-22 PROCEDURE — 2580000003 HC RX 258: Performed by: EMERGENCY MEDICINE

## 2021-03-22 PROCEDURE — 99285 EMERGENCY DEPT VISIT HI MDM: CPT

## 2021-03-22 PROCEDURE — 80053 COMPREHEN METABOLIC PANEL: CPT

## 2021-03-22 PROCEDURE — 85025 COMPLETE CBC W/AUTO DIFF WBC: CPT

## 2021-03-22 PROCEDURE — 83690 ASSAY OF LIPASE: CPT

## 2021-03-22 PROCEDURE — 96374 THER/PROPH/DIAG INJ IV PUSH: CPT

## 2021-03-22 PROCEDURE — 6360000002 HC RX W HCPCS: Performed by: EMERGENCY MEDICINE

## 2021-03-22 PROCEDURE — 83605 ASSAY OF LACTIC ACID: CPT

## 2021-03-22 PROCEDURE — 6360000004 HC RX CONTRAST MEDICATION: Performed by: RADIOLOGY

## 2021-03-22 RX ORDER — METRONIDAZOLE 500 MG/1
500 TABLET ORAL ONCE
Status: COMPLETED | OUTPATIENT
Start: 2021-03-22 | End: 2021-03-22

## 2021-03-22 RX ORDER — ATORVASTATIN CALCIUM 40 MG/1
40 TABLET, FILM COATED ORAL DAILY
COMMUNITY

## 2021-03-22 RX ORDER — 0.9 % SODIUM CHLORIDE 0.9 %
1000 INTRAVENOUS SOLUTION INTRAVENOUS ONCE
Status: COMPLETED | OUTPATIENT
Start: 2021-03-22 | End: 2021-03-23

## 2021-03-22 RX ORDER — CEFDINIR 300 MG/1
300 CAPSULE ORAL 2 TIMES DAILY
Qty: 14 CAPSULE | Refills: 0 | Status: SHIPPED | OUTPATIENT
Start: 2021-03-22 | End: 2021-03-29

## 2021-03-22 RX ORDER — KETOROLAC TROMETHAMINE 30 MG/ML
30 INJECTION, SOLUTION INTRAMUSCULAR; INTRAVENOUS ONCE
Status: COMPLETED | OUTPATIENT
Start: 2021-03-22 | End: 2021-03-22

## 2021-03-22 RX ORDER — METRONIDAZOLE 500 MG/1
500 TABLET ORAL 2 TIMES DAILY
Qty: 20 TABLET | Refills: 0 | Status: SHIPPED | OUTPATIENT
Start: 2021-03-22 | End: 2021-04-01

## 2021-03-22 RX ADMIN — IOPAMIDOL 75 ML: 755 INJECTION, SOLUTION INTRAVENOUS at 22:49

## 2021-03-22 RX ADMIN — IOHEXOL 50 ML: 240 INJECTION, SOLUTION INTRATHECAL; INTRAVASCULAR; INTRAVENOUS; ORAL at 22:49

## 2021-03-22 RX ADMIN — METRONIDAZOLE 500 MG: 500 TABLET ORAL at 23:44

## 2021-03-22 RX ADMIN — SODIUM CHLORIDE 1000 ML: 9 INJECTION, SOLUTION INTRAVENOUS at 23:52

## 2021-03-22 RX ADMIN — KETOROLAC TROMETHAMINE 30 MG: 30 INJECTION, SOLUTION INTRAMUSCULAR at 21:26

## 2021-03-22 RX ADMIN — CEFTRIAXONE 2000 MG: 2 INJECTION, POWDER, FOR SOLUTION INTRAMUSCULAR; INTRAVENOUS at 23:44

## 2021-03-22 ASSESSMENT — ENCOUNTER SYMPTOMS
BACK PAIN: 0
VOMITING: 0
SINUS PRESSURE: 0
SHORTNESS OF BREATH: 0
BLOOD IN STOOL: 0
CHEST TIGHTNESS: 0
CONSTIPATION: 1
NAUSEA: 0
DIARRHEA: 0
SORE THROAT: 0
COUGH: 0
WHEEZING: 0
ABDOMINAL PAIN: 1
ABDOMINAL DISTENTION: 0

## 2021-03-22 ASSESSMENT — PAIN SCALES - GENERAL: PAINLEVEL_OUTOF10: 3

## 2021-03-23 ASSESSMENT — PAIN SCALES - GENERAL: PAINLEVEL_OUTOF10: 0

## 2021-03-23 NOTE — ED PROVIDER NOTES
Chief complaint:  Abdominal pain    HPI history provided by the patient  Patient comes in complaining of a couple of days of left lower quadrant abdominal pain with mild constipation. No nausea or vomiting or diarrhea. No fevers, sweats or chills. No hematuria or dysuria. No blood in the stool. Has history of diverticulitis. No treatment prior to arrival.  Nothing makes it better or worse. Review of Systems   Constitutional: Negative for chills, diaphoresis, fatigue and fever. HENT: Negative for congestion, sinus pressure and sore throat. Respiratory: Negative for cough, chest tightness, shortness of breath and wheezing. Cardiovascular: Negative for chest pain and palpitations. Gastrointestinal: Positive for abdominal pain and constipation. Negative for abdominal distention, blood in stool, diarrhea, nausea and vomiting. Genitourinary: Negative for dysuria, flank pain, frequency, hematuria and urgency. Musculoskeletal: Negative for arthralgias, back pain, gait problem, joint swelling, myalgias, neck pain and neck stiffness. Skin: Negative for rash and wound. Neurological: Negative for dizziness, seizures, syncope, weakness, light-headedness, numbness and headaches. Hematological: Negative for adenopathy. All other systems reviewed and are negative. Physical Exam  Vitals signs and nursing note reviewed. Constitutional:       General: She is not in acute distress. Appearance: She is well-developed. She is not ill-appearing, toxic-appearing or diaphoretic. HENT:      Head: Normocephalic and atraumatic. Eyes:      General: No scleral icterus. Pupils: Pupils are equal, round, and reactive to light. Neck:      Musculoskeletal: Normal range of motion and neck supple. Cardiovascular:      Rate and Rhythm: Normal rate and regular rhythm. Heart sounds: Normal heart sounds. No murmur. Pulmonary:      Effort: Pulmonary effort is normal. No respiratory distress. Breath sounds: Normal breath sounds. No stridor, decreased air movement or transmitted upper airway sounds. No decreased breath sounds, wheezing, rhonchi or rales. Abdominal:      General: Bowel sounds are normal. There is no distension. Palpations: Abdomen is soft. Tenderness: There is abdominal tenderness in the left lower quadrant. There is no right CVA tenderness, left CVA tenderness, guarding or rebound. Comments: Mild left lower quadrant tenderness on palpation with no distention, no guarding, rebound tenderness or rigidity. Very mild findings. The rest of the abdomen is soft and nontender. Musculoskeletal:         General: No swelling, tenderness, deformity or signs of injury. Right lower leg: No edema. Left lower leg: No edema. Skin:     General: Skin is warm and dry. Coloration: Skin is not jaundiced or pale. Findings: No erythema or rash. Neurological:      General: No focal deficit present. Mental Status: She is alert and oriented to person, place, and time. GCS: GCS eye subscore is 4. GCS verbal subscore is 5. GCS motor subscore is 6. Cranial Nerves: No cranial nerve deficit. Coordination: Coordination normal.          Procedures     MDM     ED Course as of Mar 22 2324   Mon Mar 22, 2021   2322 Patient sitting up in the bed and comfortably no distress. States that she is feeling well and would like to go home. Work-up results, diverticulitis are discussed with her, no current perforation or abscess although potential for this is discussed with her. She states she is very comfortable going home on antibiotics and will follow up on an outpatient basis and return if she worsens. [NC]      ED Course User Index  [NC] Tarah Castro, DO        ED Course as of Mar 22 2324   Mon Mar 22, 2021   2322 Patient sitting up in the bed and comfortably no distress. States that she is feeling well and would like to go home.   Work-up results, 0.05 - 0.50 E9/L    Basophils Absolute 0.09 0.00 - 0.20 E9/L   Comprehensive Metabolic Panel   Result Value Ref Range    Sodium 141 132 - 146 mmol/L    Potassium 4.7 3.5 - 5.0 mmol/L    Chloride 104 98 - 107 mmol/L    CO2 27 22 - 29 mmol/L    Anion Gap 10 7 - 16 mmol/L    Glucose 63 (L) 74 - 99 mg/dL    BUN 20 6 - 20 mg/dL    CREATININE 0.8 0.5 - 1.0 mg/dL    GFR Non-African American >60 >=60 mL/min/1.73    GFR African American >60     Calcium 9.5 8.6 - 10.2 mg/dL    Total Protein 6.9 6.4 - 8.3 g/dL    Albumin 4.4 3.5 - 5.2 g/dL    Total Bilirubin 0.3 0.0 - 1.2 mg/dL    Alkaline Phosphatase 90 35 - 104 U/L    ALT 18 0 - 32 U/L    AST 15 0 - 31 U/L   Lactic Acid, Plasma   Result Value Ref Range    Lactic Acid 3.2 (H) 0.5 - 2.2 mmol/L   Lipase   Result Value Ref Range    Lipase 99 (H) 13 - 60 U/L   Urinalysis   Result Value Ref Range    Color, UA Yellow Straw/Yellow    Clarity, UA Clear Clear    Glucose, Ur >=1000 (A) Negative mg/dL    Bilirubin Urine Negative Negative    Ketones, Urine Negative Negative mg/dL    Specific Gravity, UA >=1.030 1.005 - 1.030    Blood, Urine Negative Negative    pH, UA 5.0 5.0 - 9.0    Protein, UA Negative Negative mg/dL    Urobilinogen, Urine 0.2 <2.0 E.U./dL    Nitrite, Urine Negative Negative    Leukocyte Esterase, Urine Negative Negative       Radiology:  CT ABDOMEN PELVIS W IV CONTRAST Additional Contrast? Oral   Final Result   Mild stranding of the fat around the junction of the descending and sigmoid   colon in the left lower quadrant just below the pelvic inlet, highly   suspicious for early/mild diverticulitis. No evidence of perforation or   abscess formation. A 1-2 mm punctate nonobstructing left renal stone. Mild hepatosplenomegaly, no change. Chronic parenchymal scarring and stable small nodules at the lung bases.              ------------------------- NURSING NOTES AND VITALS REVIEWED ---------------------------  Date / Time Roomed:  3/22/2021  8:34 PM  ED Bed

## 2021-06-30 ENCOUNTER — TELEPHONE (OUTPATIENT)
Dept: SURGERY | Age: 61
End: 2021-06-30

## 2021-06-30 ENCOUNTER — INITIAL CONSULT (OUTPATIENT)
Dept: SURGERY | Age: 61
End: 2021-06-30
Payer: MEDICAID

## 2021-06-30 VITALS
HEIGHT: 64 IN | TEMPERATURE: 96.9 F | OXYGEN SATURATION: 95 % | BODY MASS INDEX: 26.8 KG/M2 | SYSTOLIC BLOOD PRESSURE: 129 MMHG | WEIGHT: 157 LBS | HEART RATE: 97 BPM | RESPIRATION RATE: 18 BRPM | DIASTOLIC BLOOD PRESSURE: 62 MMHG

## 2021-06-30 DIAGNOSIS — D69.6 THROMBOCYTOPENIA (HCC): ICD-10-CM

## 2021-06-30 DIAGNOSIS — R10.12 LUQ PAIN: ICD-10-CM

## 2021-06-30 DIAGNOSIS — R16.1 SPLENOMEGALY: Primary | ICD-10-CM

## 2021-06-30 PROCEDURE — G8419 CALC BMI OUT NRM PARAM NOF/U: HCPCS | Performed by: SURGERY

## 2021-06-30 PROCEDURE — 4004F PT TOBACCO SCREEN RCVD TLK: CPT | Performed by: SURGERY

## 2021-06-30 PROCEDURE — 3017F COLORECTAL CA SCREEN DOC REV: CPT | Performed by: SURGERY

## 2021-06-30 PROCEDURE — 99204 OFFICE O/P NEW MOD 45 MIN: CPT | Performed by: SURGERY

## 2021-06-30 PROCEDURE — G8427 DOCREV CUR MEDS BY ELIG CLIN: HCPCS | Performed by: SURGERY

## 2021-06-30 RX ORDER — LACTULOSE 10 G/15ML
SOLUTION ORAL
COMMUNITY
Start: 2021-05-11 | End: 2021-06-30 | Stop reason: ALTCHOICE

## 2021-06-30 RX ORDER — LANCETS 33 GAUGE
EACH MISCELLANEOUS
COMMUNITY
Start: 2021-04-25

## 2021-06-30 NOTE — PROGRESS NOTES
General Surgery History and Physical  [facility]    Patient's Name/Date of Birth: Cipriano Myers / 1960    Date: July 2, 2021     Surgeon: Alvaro Barrios M.D.    PCP: Demetrius Shah DO     Chief Complaint: splenomegaly, LUQ pain    HPI:   Cipriano Myers is a 63 y.o. female who presents for evaluation of left upper quadrant plain splenomegaly. Timing is intermittent, radiation to left upper quadrant, alleviated by none and started several months ago and severity is 8/10. Patient has been followed for thrombocytopenia with the [facility] and Dr. Nneka Pimentel. Per patient's report she has had decreasing levels of her platelet count as well as increasing size to her spleen. Her last platelet count was 352,882 in March and she has significant pain on the left side especially with physical activity. She does admit some intermittent shortness of breath associated with the pain. Denies any reflux nausea or vomiting. Patient Active Problem List   Diagnosis    Thrombocytopenia ([facility] [address]       Past Medical History:   Diagnosis Date    Diabetes mellitus ([facility]     Hyperlipidemia        Past Surgical History:   Procedure Laterality Date    TONSILLECTOMY         No Known Allergies    The patient has a family history that is negative for severe cardiovascular or respiratory issues, negative for reaction to anesthesia. Time spent reviewing past medical, surgical, social and family history, vitals, nursing assessment and images. No changes from above documented history. Social History     Socioeconomic History    Marital status:       Spouse name: Not on file    Number of children: Not on file    Years of education: Not on file    Highest education level: Not on file   Occupational History    Not on file   Tobacco Use    Smoking status: Current Every Day Smoker     Packs/day: 1.00     Types: Cigarettes    Smokeless tobacco: Never Used   Substance and Sexual Activity    Alcohol use: Yes     Comment: occ    Drug use: No    Sexual activity: Never   Other Topics Concern    Not on file   Social History Narrative    Not on file     Social Determinants of Health     Financial Resource Strain:     Difficulty of Paying Living Expenses:    Food Insecurity:     Worried About Running Out of Food in the Last Year:     [address] in the Last Year:    Transportation Needs:     Lack of Transportation (Medical):  Lack of Transportation (Non-Medical):    Physical Activity:     Days of Exercise per Week:     Minutes of Exercise per Session:    Stress:     Feeling of Stress :    Social Connections:     Frequency of Communication with Friends and Family:     Frequency of Social Gatherings with Friends and Family:     Attends [employer] Services:     Active Member of Clubs or Organizations:     Attends Club or Organization Meetings:     Marital Status:    Intimate Partner Violence:     Fear of Current or Ex-Partner:     Emotionally Abused:     Physically Abused:     Sexually Abused:        I have reviewed relevant labs from this admission and interpretation is included in my assessment and plan    Review of Systems    A complete 10 system review was performed and are otherwise negative unless mentioned in the above HPI. Specific negatives are listed below but may not include all those reviewed.     General ROS: negative obtundation, AMS  ENT ROS: negative rhinorrhea, epistaxis  Allergy and Immunology ROS: negative itchy/watery eyes or nasal congestion  Hematological and Lymphatic ROS: negative spontaneous bleeding or bruising  Endocrine ROS: negative  lethargy, mood swings, palpitations or polydipsia/polyuria  Respiratory ROS: negative sputum changes, stridor, tachypnea or wheezing  Cardiovascular ROS: negative for - loss of consciousness, murmur or orthopnea  Gastrointestinal ROS: negative for - hematochezia or hematemesis  Genito-Urinary ROS: negative for -  genital discharge or hematuria  Musculoskeletal ROS: negative for - focal weakness, gangrene  Psych/Neuro ROS: negative for - visual or auditory hallucinations, suicidal ideation    Physical exam:   /62 (Site: Right Upper Arm, Position: Sitting, Cuff Size: Medium Adult)   Pulse 97   Temp 96.9 °F (36.1 °C) (Temporal)   Resp 18   Ht 5' 4\" (1.626 m)   Wt 157 lb (71.2 kg)   SpO2 95%   BMI 26.95 kg/m²   General appearance:  NAD, appears stated age  Head: NCAT, PERRLA, EOMI, red conjunctiva  Neck: supple, no masses, trachea midline  Lungs: Equal chest rise bilateral, no retractions, no wheezing  Heart: Reg rate  Abdomen: soft, tender left upper quadrant palpable spleen below the costal margin, nondistended  Skin; warm and dry, no cyanosis  Gu: no cva tenderness  Extremities: atraumatic, no focal motor deficits, no open wounds  Psych: No tremor, visual hallucinations      Radiology: I reviewed relevant abdominal imaging from this admission and that available in the EMR including CT abd/pel from March 2020. My assessment is splenomegaly with 1.3 cm cyst        Assessment:  Magen Willis is a 63 y.o. female with splenomegaly and consumptive coagulopathy with thrombocytopenia  Patient Active Problem List   Diagnosis    Thrombocytopenia ([facility] [address]         Plan:  Per recommendations of hematology will proceed with laparoscopic robot-assisted splenectomy  Pneumococcal, HIb and meningococcal vaccines to be administered 2 weeks prior to her surgery  She will need 2-month follow-up for second dose of her vaccines  I discussed with her the risk benefits alternatives to splenectomy including postoperative hematoma infection, I also discussed postsplenectomy sepsis and the risk that this entails.   Discussed with her the ability to prevent some of this with vaccinations  Also discussed with her the risks of blood transfusion or hemorrhage during surgery the risk of an open operation she agrees to proceed        Mela Haro MD  8:07 AM  7/2/2021

## 2021-06-30 NOTE — TELEPHONE ENCOUNTER
Prior Authorization Form:      DEMOGRAPHICS:                     Patient Name:  Fabrice Sanchez  Patient :  1960            Insurance:  Payor: [facility] / Plan: [employer] / Product Type: *No Product type* /   Insurance ID Number:    Payor/Plan Subscr  Sex Relation Sub.  Ins. ID Effective Group Num   1. [address] [facility] 1960 Female Self [id number]                                   [address]         DIAGNOSIS & PROCEDURE:                       Procedure/Operation: Laparoscopic robotic splenectomy           CPT Code: 21515    Diagnosis:  Splenomegaly, LUQ pain    ICD10 Code: R16.1 + R10.12    Location:  [address] [place] [address]    Surgeon:  Kati Castellanos INFORMATION:                          Date: 21    Time: TBD              Anesthesia:  General                                                       Status:  Outpatient        Special Comments:         Electronically signed by Ge Damico RN on 2021 at 3:00 PM

## 2021-07-02 PROBLEM — D69.6 THROMBOCYTOPENIA (HCC): Status: ACTIVE | Noted: 2021-07-02

## 2021-07-16 RX ORDER — SODIUM CHLORIDE 9 MG/ML
INJECTION, SOLUTION INTRAVENOUS CONTINUOUS
Status: CANCELLED | OUTPATIENT
Start: 2021-07-16

## 2021-07-19 ENCOUNTER — HOSPITAL ENCOUNTER (OUTPATIENT)
Dept: PREADMISSION TESTING | Age: 61
Discharge: HOME | End: 2021-07-19
Payer: MEDICAID

## 2021-07-19 VITALS — WEIGHT: 158 LBS | HEIGHT: 64 IN | BODY MASS INDEX: 26.98 KG/M2

## 2021-07-19 DIAGNOSIS — Z01.818 PREOP TESTING: Primary | ICD-10-CM

## 2021-07-19 LAB
ABO/RH: NORMAL
ANION GAP SERPL CALCULATED.3IONS-SCNC: 11 MMOL/L (ref 7–16)
ANTIBODY SCREEN: NORMAL
APTT: 35.2 SEC (ref 24.5–35.1)
BUN BLDV-MCNC: 17 MG/DL (ref 6–20)
CALCIUM SERPL-MCNC: 9.5 MG/DL (ref 8.6–10.2)
CHLORIDE BLD-SCNC: 107 MMOL/L (ref 98–107)
CO2: 23 MMOL/L (ref 22–29)
CREAT SERPL-MCNC: 0.7 MG/DL (ref 0.5–1)
EKG ATRIAL RATE: 84 BPM
EKG P AXIS: 69 DEGREES
EKG P-R INTERVAL: 150 MS
EKG Q-T INTERVAL: 378 MS
EKG QRS DURATION: 84 MS
EKG QTC CALCULATION (BAZETT): 446 MS
EKG R AXIS: 77 DEGREES
EKG T AXIS: 62 DEGREES
EKG VENTRICULAR RATE: 84 BPM
GFR AFRICAN AMERICAN: >60
GFR NON-AFRICAN AMERICAN: >60 ML/MIN/1.73
GLUCOSE BLD-MCNC: 146 MG/DL (ref 74–99)
HCT VFR BLD CALC: 49.6 % (ref 34–48)
HEMOGLOBIN: 16 G/DL (ref 11.5–15.5)
INR BLD: 0.9
MCH RBC QN AUTO: 27.4 PG (ref 26–35)
MCHC RBC AUTO-ENTMCNC: 32.3 % (ref 32–34.5)
MCV RBC AUTO: 84.8 FL (ref 80–99.9)
PDW BLD-RTO: 14.8 FL (ref 11.5–15)
PLATELET # BLD: 126 E9/L (ref 130–450)
PMV BLD AUTO: 10.4 FL (ref 7–12)
POTASSIUM REFLEX MAGNESIUM: 4.2 MMOL/L (ref 3.5–5)
PROTHROMBIN TIME: 10.5 SEC (ref 9.3–12.4)
RBC # BLD: 5.85 E12/L (ref 3.5–5.5)
SODIUM BLD-SCNC: 141 MMOL/L (ref 132–146)
WBC # BLD: 7.4 E9/L (ref 4.5–11.5)

## 2021-07-19 PROCEDURE — 85610 PROTHROMBIN TIME: CPT

## 2021-07-19 PROCEDURE — 85730 THROMBOPLASTIN TIME PARTIAL: CPT

## 2021-07-19 PROCEDURE — 93005 ELECTROCARDIOGRAM TRACING: CPT | Performed by: ANESTHESIOLOGY

## 2021-07-19 PROCEDURE — 86900 BLOOD TYPING SEROLOGIC ABO: CPT

## 2021-07-19 PROCEDURE — 86850 RBC ANTIBODY SCREEN: CPT

## 2021-07-19 PROCEDURE — 80048 BASIC METABOLIC PNL TOTAL CA: CPT

## 2021-07-19 PROCEDURE — 86901 BLOOD TYPING SEROLOGIC RH(D): CPT

## 2021-07-19 PROCEDURE — 36415 COLL VENOUS BLD VENIPUNCTURE: CPT

## 2021-07-19 PROCEDURE — 85027 COMPLETE CBC AUTOMATED: CPT

## 2021-07-19 RX ORDER — SENNA AND DOCUSATE SODIUM 50; 8.6 MG/1; MG/1
1 TABLET, FILM COATED ORAL NIGHTLY
COMMUNITY

## 2021-07-19 NOTE — PROGRESS NOTES
[address] [facility]                                                                                                                    PRE OP INSTRUCTIONS FOR  Ilsa Silvestre        Date: 7/19/2021    Date of surgery: 7/22/21   Arrival Time: Hospital will call you between 5pm and 7pm with your final arrival time for surgery    1. Do not eat or drink anything after midnight prior to surgery. This includes no water, chewing gum, mints or ice chips. 2. Take the following medications with a small sip of water on the morning of Surgery: none     3. Diabetics may take evening dose of insulin but none after midnight. If you feel symptomatic or low blood sugar morning of surgery drink 1-2 ounces of apple juice only. 4. Aspirin, Ibuprofen, Advil, Naproxen, Vitamin E and other Anti-inflammatory products should be stopped  before surgery  as directed by your physician. Take Tylenol only unless instructed otherwise by your surgeon. 5. Check with your Doctor regarding stopping Plavix, Coumadin, Lovenox, Eliquis, Effient, or other blood thinners. 6. Do not smoke,use illicit drugs and do not drink any alcoholic beverages 24 hours prior to surgery. 7. You may brush your teeth the morning of surgery. DO NOT SWALLOW WATER    8. You MUST make arrangements for a responsible adult to take you home after your surgery. You will not be allowed to leave alone or drive yourself home. It is strongly suggested someone stay with you the first 24 hrs. Your surgery will be cancelled if you do not have a ride home. 9. Please wear simple, loose fitting clothing to the hospital.  Brittani Shingles not bring valuables (money, credit cards, checkbooks, etc.) Do not wear any makeup (including no eye makeup) or nail polish on your fingers or toes. 10. DO NOT wear any jewelry or piercings on day of surgery. All body piercing jewelry must be removed. 11.  Shower the night before surgery with ___Antibacterial soap 12. TOTAL JOINT REPLACEMENT/HYSTERECTOMY PATIENTS ONLY---Remember to bring Blood Bank bracelet to the hospital on the day of surgery. 13. If you have a Living Will and Durable Power of  for Healthcare, please bring in a copy. 15. Notify your Surgeon if you develop any illness between now and surgery time, cough, cold, fever, sore throat, nausea, vomiting, etc.  Please notify your surgeon if you experience dizziness, shortness of breath or blurred vision between now & the time of your surgery. 15. If you have ___dentures, they will be removed before going to the OR; we will provide you a container. If you wear ___contact lenses or ___glasses, they will be removed; please bring a case for them. 16. To provide excellent care visitors will be limited to 1 in the room at any given time. 16. During flu season no children under the age of 15 are permitted in the hospital for the safety of all patients. 18. Other                  Please call AMBULATORY CARE if you have any further questions.    [address]     [address]

## 2021-07-19 NOTE — PROGRESS NOTES
Faxed cbc, bmp rsults to dr Mani Jhaveri and  [facility] offices, confirmations received

## 2021-07-21 ENCOUNTER — ANESTHESIA EVENT (OUTPATIENT)
Dept: OPERATING ROOM | Age: 61
DRG: 650 | End: 2021-07-21
Payer: MEDICAID

## 2021-07-22 ENCOUNTER — HOSPITAL ENCOUNTER (INPATIENT)
Age: 61
LOS: 3 days | Discharge: HOME | DRG: 650 | End: 2021-07-25
Attending: SURGERY | Admitting: SURGERY
Payer: MEDICAID

## 2021-07-22 ENCOUNTER — ANESTHESIA (OUTPATIENT)
Dept: OPERATING ROOM | Age: 61
DRG: 650 | End: 2021-07-22
Payer: MEDICAID

## 2021-07-22 VITALS
DIASTOLIC BLOOD PRESSURE: 79 MMHG | RESPIRATION RATE: 4 BRPM | TEMPERATURE: 95.4 F | SYSTOLIC BLOOD PRESSURE: 159 MMHG | OXYGEN SATURATION: 100 %

## 2021-07-22 DIAGNOSIS — G89.18 POSTOPERATIVE PAIN: Primary | ICD-10-CM

## 2021-07-22 PROBLEM — R16.1 SPLENOMEGALY: Status: ACTIVE | Noted: 2021-07-22

## 2021-07-22 LAB
METER GLUCOSE: 211 MG/DL (ref 74–99)
METER GLUCOSE: 239 MG/DL (ref 74–99)
METER GLUCOSE: 270 MG/DL (ref 74–99)
METER GLUCOSE: 98 MG/DL (ref 74–99)

## 2021-07-22 PROCEDURE — 3700000001 HC ADD 15 MINUTES (ANESTHESIA): Performed by: SURGERY

## 2021-07-22 PROCEDURE — 82962 GLUCOSE BLOOD TEST: CPT

## 2021-07-22 PROCEDURE — 6370000000 HC RX 637 (ALT 250 FOR IP): Performed by: INTERNAL MEDICINE

## 2021-07-22 PROCEDURE — 1200000000 HC SEMI PRIVATE

## 2021-07-22 PROCEDURE — 6360000002 HC RX W HCPCS: Performed by: SURGERY

## 2021-07-22 PROCEDURE — 6370000000 HC RX 637 (ALT 250 FOR IP): Performed by: SURGERY

## 2021-07-22 PROCEDURE — 2720000010 HC SURG SUPPLY STERILE: Performed by: SURGERY

## 2021-07-22 PROCEDURE — 7100000001 HC PACU RECOVERY - ADDTL 15 MIN: Performed by: SURGERY

## 2021-07-22 PROCEDURE — 38120 LAPAROSCOPY SPLENECTOMY: CPT | Performed by: SURGERY

## 2021-07-22 PROCEDURE — 2500000003 HC RX 250 WO HCPCS: Performed by: NURSE ANESTHETIST, CERTIFIED REGISTERED

## 2021-07-22 PROCEDURE — 88305 TISSUE EXAM BY PATHOLOGIST: CPT

## 2021-07-22 PROCEDURE — 3600000019 HC SURGERY ROBOT ADDTL 15MIN: Performed by: SURGERY

## 2021-07-22 PROCEDURE — 7100000000 HC PACU RECOVERY - FIRST 15 MIN: Performed by: SURGERY

## 2021-07-22 PROCEDURE — 6360000002 HC RX W HCPCS: Performed by: ANESTHESIOLOGY

## 2021-07-22 PROCEDURE — 3700000000 HC ANESTHESIA ATTENDED CARE: Performed by: SURGERY

## 2021-07-22 PROCEDURE — 6360000002 HC RX W HCPCS: Performed by: NURSE ANESTHETIST, CERTIFIED REGISTERED

## 2021-07-22 PROCEDURE — 2580000003 HC RX 258: Performed by: SURGERY

## 2021-07-22 PROCEDURE — 6360000002 HC RX W HCPCS

## 2021-07-22 PROCEDURE — 2580000003 HC RX 258: Performed by: NURSE ANESTHETIST, CERTIFIED REGISTERED

## 2021-07-22 PROCEDURE — 3600000009 HC SURGERY ROBOT BASE: Performed by: SURGERY

## 2021-07-22 PROCEDURE — 2500000003 HC RX 250 WO HCPCS: Performed by: SURGERY

## 2021-07-22 PROCEDURE — S2900 ROBOTIC SURGICAL SYSTEM: HCPCS | Performed by: SURGERY

## 2021-07-22 PROCEDURE — 07TP4ZZ RESECTION OF SPLEEN, PERCUTANEOUS ENDOSCOPIC APPROACH: ICD-10-PCS | Performed by: SURGERY

## 2021-07-22 PROCEDURE — 2709999900 HC NON-CHARGEABLE SUPPLY: Performed by: SURGERY

## 2021-07-22 RX ORDER — GLYCOPYRROLATE 1 MG/5 ML
SYRINGE (ML) INTRAVENOUS PRN
Status: DISCONTINUED | OUTPATIENT
Start: 2021-07-22 | End: 2021-07-22 | Stop reason: SDUPTHER

## 2021-07-22 RX ORDER — LIDOCAINE HYDROCHLORIDE 20 MG/ML
INJECTION, SOLUTION INTRAVENOUS PRN
Status: DISCONTINUED | OUTPATIENT
Start: 2021-07-22 | End: 2021-07-22 | Stop reason: SDUPTHER

## 2021-07-22 RX ORDER — MORPHINE SULFATE 2 MG/ML
2 INJECTION, SOLUTION INTRAMUSCULAR; INTRAVENOUS
Status: DISCONTINUED | OUTPATIENT
Start: 2021-07-22 | End: 2021-07-25 | Stop reason: HOSPADM

## 2021-07-22 RX ORDER — SODIUM CHLORIDE 0.9 % (FLUSH) 0.9 %
10 SYRINGE (ML) INJECTION EVERY 12 HOURS SCHEDULED
Status: DISCONTINUED | OUTPATIENT
Start: 2021-07-22 | End: 2021-07-25 | Stop reason: HOSPADM

## 2021-07-22 RX ORDER — OXYCODONE HYDROCHLORIDE 5 MG/1
10 TABLET ORAL EVERY 4 HOURS PRN
Status: DISCONTINUED | OUTPATIENT
Start: 2021-07-22 | End: 2021-07-25 | Stop reason: HOSPADM

## 2021-07-22 RX ORDER — HYDROCODONE BITARTRATE AND ACETAMINOPHEN 5; 325 MG/1; MG/1
1 TABLET ORAL PRN
Status: DISCONTINUED | OUTPATIENT
Start: 2021-07-22 | End: 2021-07-22

## 2021-07-22 RX ORDER — ROCURONIUM BROMIDE 10 MG/ML
INJECTION, SOLUTION INTRAVENOUS PRN
Status: DISCONTINUED | OUTPATIENT
Start: 2021-07-22 | End: 2021-07-22 | Stop reason: SDUPTHER

## 2021-07-22 RX ORDER — SODIUM CHLORIDE 9 MG/ML
25 INJECTION, SOLUTION INTRAVENOUS PRN
Status: DISCONTINUED | OUTPATIENT
Start: 2021-07-22 | End: 2021-07-22

## 2021-07-22 RX ORDER — ONDANSETRON 2 MG/ML
4 INJECTION INTRAMUSCULAR; INTRAVENOUS EVERY 6 HOURS PRN
Status: DISCONTINUED | OUTPATIENT
Start: 2021-07-22 | End: 2021-07-25 | Stop reason: HOSPADM

## 2021-07-22 RX ORDER — SODIUM CHLORIDE 0.9 % (FLUSH) 0.9 %
10 SYRINGE (ML) INJECTION EVERY 12 HOURS SCHEDULED
Status: DISCONTINUED | OUTPATIENT
Start: 2021-07-22 | End: 2021-07-22

## 2021-07-22 RX ORDER — SODIUM CHLORIDE 9 MG/ML
25 INJECTION, SOLUTION INTRAVENOUS PRN
Status: DISCONTINUED | OUTPATIENT
Start: 2021-07-22 | End: 2021-07-25 | Stop reason: HOSPADM

## 2021-07-22 RX ORDER — FENTANYL CITRATE 50 UG/ML
INJECTION, SOLUTION INTRAMUSCULAR; INTRAVENOUS PRN
Status: DISCONTINUED | OUTPATIENT
Start: 2021-07-22 | End: 2021-07-22 | Stop reason: SDUPTHER

## 2021-07-22 RX ORDER — BUPIVACAINE HYDROCHLORIDE 5 MG/ML
INJECTION, SOLUTION EPIDURAL; INTRACAUDAL PRN
Status: DISCONTINUED | OUTPATIENT
Start: 2021-07-22 | End: 2021-07-22 | Stop reason: ALTCHOICE

## 2021-07-22 RX ORDER — DIPHENHYDRAMINE HYDROCHLORIDE 50 MG/ML
12.5 INJECTION INTRAMUSCULAR; INTRAVENOUS
Status: DISCONTINUED | OUTPATIENT
Start: 2021-07-22 | End: 2021-07-22

## 2021-07-22 RX ORDER — PROMETHAZINE HYDROCHLORIDE 25 MG/ML
6.25 INJECTION, SOLUTION INTRAMUSCULAR; INTRAVENOUS
Status: DISCONTINUED | OUTPATIENT
Start: 2021-07-22 | End: 2021-07-22

## 2021-07-22 RX ORDER — KETOROLAC TROMETHAMINE 30 MG/ML
30 INJECTION, SOLUTION INTRAMUSCULAR; INTRAVENOUS EVERY 6 HOURS
Status: DISCONTINUED | OUTPATIENT
Start: 2021-07-22 | End: 2021-07-25 | Stop reason: HOSPADM

## 2021-07-22 RX ORDER — MEPERIDINE HYDROCHLORIDE 25 MG/ML
12.5 INJECTION INTRAMUSCULAR; INTRAVENOUS; SUBCUTANEOUS EVERY 5 MIN PRN
Status: DISCONTINUED | OUTPATIENT
Start: 2021-07-22 | End: 2021-07-22

## 2021-07-22 RX ORDER — NEOSTIGMINE METHYLSULFATE 1 MG/ML
INJECTION, SOLUTION INTRAVENOUS PRN
Status: DISCONTINUED | OUTPATIENT
Start: 2021-07-22 | End: 2021-07-22 | Stop reason: SDUPTHER

## 2021-07-22 RX ORDER — SODIUM CHLORIDE 9 MG/ML
INJECTION, SOLUTION INTRAVENOUS CONTINUOUS PRN
Status: DISCONTINUED | OUTPATIENT
Start: 2021-07-22 | End: 2021-07-22 | Stop reason: SDUPTHER

## 2021-07-22 RX ORDER — NICOTINE POLACRILEX 4 MG
15 LOZENGE BUCCAL PRN
Status: DISCONTINUED | OUTPATIENT
Start: 2021-07-22 | End: 2021-07-25 | Stop reason: HOSPADM

## 2021-07-22 RX ORDER — SODIUM CHLORIDE 9 MG/ML
INJECTION, SOLUTION INTRAVENOUS CONTINUOUS
Status: DISCONTINUED | OUTPATIENT
Start: 2021-07-22 | End: 2021-07-22

## 2021-07-22 RX ORDER — DEXTROSE MONOHYDRATE 50 MG/ML
100 INJECTION, SOLUTION INTRAVENOUS PRN
Status: DISCONTINUED | OUTPATIENT
Start: 2021-07-22 | End: 2021-07-25 | Stop reason: HOSPADM

## 2021-07-22 RX ORDER — ONDANSETRON 2 MG/ML
INJECTION INTRAMUSCULAR; INTRAVENOUS PRN
Status: DISCONTINUED | OUTPATIENT
Start: 2021-07-22 | End: 2021-07-22 | Stop reason: SDUPTHER

## 2021-07-22 RX ORDER — ACETAMINOPHEN 325 MG/1
650 TABLET ORAL EVERY 4 HOURS PRN
Status: DISCONTINUED | OUTPATIENT
Start: 2021-07-22 | End: 2021-07-25 | Stop reason: HOSPADM

## 2021-07-22 RX ORDER — PROCHLORPERAZINE EDISYLATE 5 MG/ML
5 INJECTION INTRAMUSCULAR; INTRAVENOUS
Status: DISCONTINUED | OUTPATIENT
Start: 2021-07-22 | End: 2021-07-22

## 2021-07-22 RX ORDER — HYDROCODONE BITARTRATE AND ACETAMINOPHEN 5; 325 MG/1; MG/1
2 TABLET ORAL PRN
Status: DISCONTINUED | OUTPATIENT
Start: 2021-07-22 | End: 2021-07-22

## 2021-07-22 RX ORDER — PNEUMOCOCCAL VACCINE POLYVALENT 25; 25; 25; 25; 25; 25; 25; 25; 25; 25; 25; 25; 25; 25; 25; 25; 25; 25; 25; 25; 25; 25; 25 UG/.5ML; UG/.5ML; UG/.5ML; UG/.5ML; UG/.5ML; UG/.5ML; UG/.5ML; UG/.5ML; UG/.5ML; UG/.5ML; UG/.5ML; UG/.5ML; UG/.5ML; UG/.5ML; UG/.5ML; UG/.5ML; UG/.5ML; UG/.5ML; UG/.5ML; UG/.5ML; UG/.5ML; UG/.5ML; UG/.5ML
0.5 INJECTION, SOLUTION INTRAMUSCULAR; SUBCUTANEOUS ONCE
Qty: 0.5 ML | Refills: 0 | Status: SHIPPED | OUTPATIENT
Start: 2021-07-22 | End: 2021-07-22

## 2021-07-22 RX ORDER — FAMOTIDINE 20 MG/1
20 TABLET, FILM COATED ORAL 2 TIMES DAILY
Status: DISCONTINUED | OUTPATIENT
Start: 2021-07-22 | End: 2021-07-25 | Stop reason: HOSPADM

## 2021-07-22 RX ORDER — MIDAZOLAM HYDROCHLORIDE 1 MG/ML
INJECTION INTRAMUSCULAR; INTRAVENOUS PRN
Status: DISCONTINUED | OUTPATIENT
Start: 2021-07-22 | End: 2021-07-22 | Stop reason: SDUPTHER

## 2021-07-22 RX ORDER — ONDANSETRON 4 MG/1
4 TABLET, ORALLY DISINTEGRATING ORAL EVERY 8 HOURS PRN
Status: DISCONTINUED | OUTPATIENT
Start: 2021-07-22 | End: 2021-07-25 | Stop reason: HOSPADM

## 2021-07-22 RX ORDER — LABETALOL HYDROCHLORIDE 5 MG/ML
5 INJECTION, SOLUTION INTRAVENOUS EVERY 10 MIN PRN
Status: DISCONTINUED | OUTPATIENT
Start: 2021-07-22 | End: 2021-07-22

## 2021-07-22 RX ORDER — PNEUMOCOCCAL VACCINE POLYVALENT 25; 25; 25; 25; 25; 25; 25; 25; 25; 25; 25; 25; 25; 25; 25; 25; 25; 25; 25; 25; 25; 25; 25 UG/.5ML; UG/.5ML; UG/.5ML; UG/.5ML; UG/.5ML; UG/.5ML; UG/.5ML; UG/.5ML; UG/.5ML; UG/.5ML; UG/.5ML; UG/.5ML; UG/.5ML; UG/.5ML; UG/.5ML; UG/.5ML; UG/.5ML; UG/.5ML; UG/.5ML; UG/.5ML; UG/.5ML; UG/.5ML; UG/.5ML
0.5 INJECTION, SOLUTION INTRAMUSCULAR; SUBCUTANEOUS ONCE
Qty: 0.5 ML | Refills: 0 | Status: SHIPPED | OUTPATIENT
Start: 2021-07-22 | End: 2021-07-22 | Stop reason: SDUPTHER

## 2021-07-22 RX ORDER — DEXTROSE MONOHYDRATE 25 G/50ML
12.5 INJECTION, SOLUTION INTRAVENOUS PRN
Status: DISCONTINUED | OUTPATIENT
Start: 2021-07-22 | End: 2021-07-25 | Stop reason: HOSPADM

## 2021-07-22 RX ORDER — SODIUM CHLORIDE 0.9 % (FLUSH) 0.9 %
10 SYRINGE (ML) INJECTION PRN
Status: DISCONTINUED | OUTPATIENT
Start: 2021-07-22 | End: 2021-07-25 | Stop reason: HOSPADM

## 2021-07-22 RX ORDER — SODIUM CHLORIDE 0.9 % (FLUSH) 0.9 %
10 SYRINGE (ML) INJECTION PRN
Status: DISCONTINUED | OUTPATIENT
Start: 2021-07-22 | End: 2021-07-22

## 2021-07-22 RX ORDER — SODIUM CHLORIDE, SODIUM LACTATE, POTASSIUM CHLORIDE, CALCIUM CHLORIDE 600; 310; 30; 20 MG/100ML; MG/100ML; MG/100ML; MG/100ML
INJECTION, SOLUTION INTRAVENOUS CONTINUOUS
Status: DISCONTINUED | OUTPATIENT
Start: 2021-07-22 | End: 2021-07-23

## 2021-07-22 RX ORDER — OXYCODONE HYDROCHLORIDE 5 MG/1
5 TABLET ORAL EVERY 4 HOURS PRN
Status: DISCONTINUED | OUTPATIENT
Start: 2021-07-22 | End: 2021-07-25 | Stop reason: HOSPADM

## 2021-07-22 RX ORDER — DEXAMETHASONE SODIUM PHOSPHATE 10 MG/ML
INJECTION, SOLUTION INTRAMUSCULAR; INTRAVENOUS PRN
Status: DISCONTINUED | OUTPATIENT
Start: 2021-07-22 | End: 2021-07-22 | Stop reason: SDUPTHER

## 2021-07-22 RX ORDER — PROPOFOL 10 MG/ML
INJECTION, EMULSION INTRAVENOUS PRN
Status: DISCONTINUED | OUTPATIENT
Start: 2021-07-22 | End: 2021-07-22 | Stop reason: SDUPTHER

## 2021-07-22 RX ADMIN — PROPOFOL 180 MG: 10 INJECTION, EMULSION INTRAVENOUS at 09:06

## 2021-07-22 RX ADMIN — LIDOCAINE HYDROCHLORIDE 100 MG: 20 INJECTION, SOLUTION INTRAVENOUS at 09:06

## 2021-07-22 RX ADMIN — ONDANSETRON 4 MG: 2 INJECTION INTRAMUSCULAR; INTRAVENOUS at 12:23

## 2021-07-22 RX ADMIN — HYDROMORPHONE HYDROCHLORIDE 0.5 MG: 1 INJECTION, SOLUTION INTRAMUSCULAR; INTRAVENOUS; SUBCUTANEOUS at 11:05

## 2021-07-22 RX ADMIN — SODIUM CHLORIDE: 9 INJECTION, SOLUTION INTRAVENOUS at 09:06

## 2021-07-22 RX ADMIN — ROCURONIUM BROMIDE 50 MG: 10 SOLUTION INTRAVENOUS at 09:06

## 2021-07-22 RX ADMIN — FENTANYL CITRATE 100 MCG: 50 INJECTION, SOLUTION INTRAMUSCULAR; INTRAVENOUS at 09:06

## 2021-07-22 RX ADMIN — Medication 0.6 MG: at 10:51

## 2021-07-22 RX ADMIN — SODIUM CHLORIDE: 9 INJECTION, SOLUTION INTRAVENOUS at 10:22

## 2021-07-22 RX ADMIN — Medication 2000 MG: at 09:01

## 2021-07-22 RX ADMIN — BISACODYL 5 MG: 5 TABLET, COATED ORAL at 12:23

## 2021-07-22 RX ADMIN — INSULIN LISPRO 1 UNITS: 100 INJECTION, SOLUTION INTRAVENOUS; SUBCUTANEOUS at 21:35

## 2021-07-22 RX ADMIN — INSULIN LISPRO 3 UNITS: 100 INJECTION, SOLUTION INTRAVENOUS; SUBCUTANEOUS at 17:02

## 2021-07-22 RX ADMIN — KETOROLAC TROMETHAMINE 30 MG: 30 INJECTION, SOLUTION INTRAMUSCULAR; INTRAVENOUS at 18:56

## 2021-07-22 RX ADMIN — DEXAMETHASONE SODIUM PHOSPHATE 10 MG: 10 INJECTION, SOLUTION INTRAMUSCULAR; INTRAVENOUS at 09:06

## 2021-07-22 RX ADMIN — HYDROMORPHONE HYDROCHLORIDE 0.5 MG: 1 INJECTION, SOLUTION INTRAMUSCULAR; INTRAVENOUS; SUBCUTANEOUS at 11:32

## 2021-07-22 RX ADMIN — ONDANSETRON 4 MG: 2 INJECTION INTRAMUSCULAR; INTRAVENOUS at 10:45

## 2021-07-22 RX ADMIN — SODIUM CHLORIDE: 9 INJECTION, SOLUTION INTRAVENOUS at 07:22

## 2021-07-22 RX ADMIN — SODIUM CHLORIDE, POTASSIUM CHLORIDE, SODIUM LACTATE AND CALCIUM CHLORIDE: 600; 310; 30; 20 INJECTION, SOLUTION INTRAVENOUS at 14:13

## 2021-07-22 RX ADMIN — MORPHINE SULFATE 2 MG: 2 INJECTION, SOLUTION INTRAMUSCULAR; INTRAVENOUS at 16:55

## 2021-07-22 RX ADMIN — MORPHINE SULFATE 2 MG: 2 INJECTION, SOLUTION INTRAMUSCULAR; INTRAVENOUS at 12:22

## 2021-07-22 RX ADMIN — FENTANYL CITRATE 50 MCG: 50 INJECTION, SOLUTION INTRAMUSCULAR; INTRAVENOUS at 09:39

## 2021-07-22 RX ADMIN — KETOROLAC TROMETHAMINE 30 MG: 30 INJECTION, SOLUTION INTRAMUSCULAR; INTRAVENOUS at 12:22

## 2021-07-22 RX ADMIN — FAMOTIDINE 20 MG: 20 TABLET ORAL at 21:35

## 2021-07-22 RX ADMIN — MIDAZOLAM 2 MG: 1 INJECTION INTRAMUSCULAR; INTRAVENOUS at 09:01

## 2021-07-22 RX ADMIN — ROCURONIUM BROMIDE 10 MG: 10 SOLUTION INTRAVENOUS at 10:12

## 2021-07-22 RX ADMIN — SODIUM CHLORIDE, POTASSIUM CHLORIDE, SODIUM LACTATE AND CALCIUM CHLORIDE: 600; 310; 30; 20 INJECTION, SOLUTION INTRAVENOUS at 21:35

## 2021-07-22 RX ADMIN — Medication 3 MG: at 10:51

## 2021-07-22 RX ADMIN — INSULIN LISPRO 2 UNITS: 100 INJECTION, SOLUTION INTRAVENOUS; SUBCUTANEOUS at 14:09

## 2021-07-22 RX ADMIN — FAMOTIDINE 20 MG: 20 TABLET ORAL at 12:23

## 2021-07-22 ASSESSMENT — PULMONARY FUNCTION TESTS
PIF_VALUE: 28
PIF_VALUE: 29
PIF_VALUE: 30
PIF_VALUE: 21
PIF_VALUE: 30
PIF_VALUE: 0
PIF_VALUE: 22
PIF_VALUE: 20
PIF_VALUE: 21
PIF_VALUE: 29
PIF_VALUE: 18
PIF_VALUE: 29
PIF_VALUE: 22
PIF_VALUE: 20
PIF_VALUE: 21
PIF_VALUE: 31
PIF_VALUE: 21
PIF_VALUE: 29
PIF_VALUE: 22
PIF_VALUE: 30
PIF_VALUE: 24
PIF_VALUE: 12
PIF_VALUE: 21
PIF_VALUE: 30
PIF_VALUE: 30
PIF_VALUE: 25
PIF_VALUE: 28
PIF_VALUE: 20
PIF_VALUE: 30
PIF_VALUE: 22
PIF_VALUE: 21
PIF_VALUE: 29
PIF_VALUE: 29
PIF_VALUE: 12
PIF_VALUE: 21
PIF_VALUE: 29
PIF_VALUE: 27
PIF_VALUE: 29
PIF_VALUE: 30
PIF_VALUE: 30
PIF_VALUE: 21
PIF_VALUE: 17
PIF_VALUE: 28
PIF_VALUE: 32
PIF_VALUE: 29
PIF_VALUE: 22
PIF_VALUE: 20
PIF_VALUE: 29
PIF_VALUE: 19
PIF_VALUE: 21
PIF_VALUE: 30
PIF_VALUE: 30
PIF_VALUE: 28
PIF_VALUE: 2
PIF_VALUE: 30
PIF_VALUE: 12
PIF_VALUE: 21
PIF_VALUE: 12
PIF_VALUE: 20
PIF_VALUE: 0
PIF_VALUE: 30
PIF_VALUE: 20
PIF_VALUE: 21
PIF_VALUE: 29
PIF_VALUE: 30
PIF_VALUE: 21
PIF_VALUE: 30
PIF_VALUE: 21
PIF_VALUE: 30
PIF_VALUE: 28
PIF_VALUE: 22
PIF_VALUE: 23
PIF_VALUE: 29
PIF_VALUE: 27
PIF_VALUE: 3
PIF_VALUE: 30
PIF_VALUE: 29
PIF_VALUE: 29
PIF_VALUE: 32
PIF_VALUE: 20
PIF_VALUE: 30
PIF_VALUE: 22
PIF_VALUE: 22
PIF_VALUE: 28
PIF_VALUE: 29
PIF_VALUE: 22
PIF_VALUE: 27
PIF_VALUE: 27
PIF_VALUE: 32
PIF_VALUE: 18
PIF_VALUE: 22
PIF_VALUE: 26
PIF_VALUE: 30
PIF_VALUE: 21
PIF_VALUE: 30
PIF_VALUE: 22
PIF_VALUE: 0
PIF_VALUE: 19
PIF_VALUE: 30
PIF_VALUE: 14
PIF_VALUE: 2
PIF_VALUE: 12
PIF_VALUE: 30
PIF_VALUE: 25
PIF_VALUE: 30
PIF_VALUE: 29
PIF_VALUE: 22
PIF_VALUE: 21
PIF_VALUE: 4
PIF_VALUE: 24
PIF_VALUE: 30
PIF_VALUE: 30
PIF_VALUE: 23

## 2021-07-22 ASSESSMENT — PAIN DESCRIPTION - ONSET
ONSET: ON-GOING
ONSET: GRADUAL

## 2021-07-22 ASSESSMENT — PAIN SCALES - GENERAL
PAINLEVEL_OUTOF10: 2
PAINLEVEL_OUTOF10: 2
PAINLEVEL_OUTOF10: 0
PAINLEVEL_OUTOF10: 7
PAINLEVEL_OUTOF10: 3
PAINLEVEL_OUTOF10: 7
PAINLEVEL_OUTOF10: 0
PAINLEVEL_OUTOF10: 10
PAINLEVEL_OUTOF10: 9
PAINLEVEL_OUTOF10: 10

## 2021-07-22 ASSESSMENT — PAIN DESCRIPTION - LOCATION
LOCATION: ABDOMEN

## 2021-07-22 ASSESSMENT — PAIN DESCRIPTION - PAIN TYPE
TYPE: SURGICAL PAIN

## 2021-07-22 ASSESSMENT — PAIN - FUNCTIONAL ASSESSMENT
PAIN_FUNCTIONAL_ASSESSMENT: PREVENTS OR INTERFERES SOME ACTIVE ACTIVITIES AND ADLS
PAIN_FUNCTIONAL_ASSESSMENT: 0-10

## 2021-07-22 ASSESSMENT — PAIN DESCRIPTION - ORIENTATION
ORIENTATION: LEFT

## 2021-07-22 ASSESSMENT — PAIN DESCRIPTION - FREQUENCY
FREQUENCY: CONTINUOUS
FREQUENCY: CONTINUOUS
FREQUENCY: INTERMITTENT
FREQUENCY: INTERMITTENT

## 2021-07-22 ASSESSMENT — PAIN DESCRIPTION - PROGRESSION
CLINICAL_PROGRESSION: NOT CHANGED
CLINICAL_PROGRESSION: GRADUALLY WORSENING
CLINICAL_PROGRESSION: RAPIDLY IMPROVING
CLINICAL_PROGRESSION: GRADUALLY IMPROVING

## 2021-07-22 ASSESSMENT — PAIN DESCRIPTION - DESCRIPTORS
DESCRIPTORS: DISCOMFORT
DESCRIPTORS: ACHING;DISCOMFORT
DESCRIPTORS: BURNING;DISCOMFORT

## 2021-07-22 ASSESSMENT — LIFESTYLE VARIABLES: SMOKING_STATUS: 1

## 2021-07-22 NOTE — CONSULTS
[facility]  Consult Note    [address] [facility] [address]  [place], [address]  Phone [phone]   [phone]      Admit Date: 2021  6:48 AM  Pt Name: Em Burns  MRN: [id number]  : 1960  Reason for Consult:  No chief complaint on file. Requesting Physician:  Jered Gonzalez MD  PCP: Ese Champion DO  History Obtained From:  patient, chart   ID consulted for Splenomegaly [R16.1]  on hospital day 0  CHIEF COMPLAINT     No chief complaint on file. HISTORYOF PRESENT ILLNESS   Em Burns is a 63 y.o. female who presents with significant past medical history of  has a past medical history of Diabetes mellitus ([address] and Hyperlipidemia.    ED TRIAGEVITALS  BP: (!) 119/59, Temp: 97.1 °F (36.2 °C), Pulse: 91, Resp: 21, SpO2: 98 %  HPI  ID was asked to see for vaccination recommendation   Pt is s/p elective splenectomy for splenomegaly /thrombocytopenia  She is in pain   She has no f/c/nv/d  She only received outpt meningococcal vaccine  Currently afebrile  REVIEW OF SYSTEMS     CONSTITUTIONAL:   No fever, chills, weight loss  ALLERGIES:    No urticaria, hay fever,    EYES:     No blurry vision, loss of vision,eye pain  ENT:      No hearing loss, sore throat  CARDIOVASCULAR:   No chest pain or palpitations  RESPIRATORY:   No cough, sob  ENDOCRINE:    No increase thirst, urination   HEME-LYMPH:   No easy bruising or bleeding  GI:     No nausea, vomiting or diarrhea  :     No urinary complaints  NEURO:    No seizures, stroke, HA  MUSCULOSKELETAL:    muscle aches or pain, no joint pain  SKIN:     No rash or itch  PSYCH:    No depression or anxiety    Medications Prior to Admission: Ertugliflozin L-PyroglutamicAc (STEGLATRO) 15 MG TABS, Take 15 mg by mouth  sennosides-docusate sodium (SENOKOT-S) 8.6-50 MG tablet, Take 1 tablet by mouth nightly  Lancets (ONETOUCH DELICA PLUS [id number]) MISC, USE AS DIRECTED TWICE DAILY  atorvastatin (LIPITOR) 40 MG tablet, Take 40 mg by mouth daily  linaclotide (LINZESS) 290 MCG CAPS capsule, Take 145 mcg by mouth every morning (before breakfast)   Dulaglutide (TRULICITY) 1.5 WP/2.1NF SOPN, Inject 1.5 mg into the skin once a week   metFORMIN (GLUCOPHAGE) 1000 MG tablet, Take 1,000 mg by mouth 2 times daily (with meals)  glyBURIDE (DIABETA) 2.5 MG tablet, Take 5 mg by mouth 2 times daily (with meals)   CURRENT MEDICATIONS     Current Facility-Administered Medications:     oxyCODONE (ROXICODONE) immediate release tablet 5 mg, 5 mg, Oral, Q4H PRN **OR** oxyCODONE (ROXICODONE) immediate release tablet 10 mg, 10 mg, Oral, Q4H PRN, Sunshine Manrique MD    morphine (PF) injection 2 mg, 2 mg, Intravenous, Q3H PRN, Sunshine Manrique MD, 2 mg at 07/22/21 1222    sodium chloride flush 0.9 % injection 10 mL, 10 mL, Intravenous, 2 times per day, Sunshine Manrique MD    sodium chloride flush 0.9 % injection 10 mL, 10 mL, Intravenous, PRN, Sunshine Manrique MD    0.9 % sodium chloride infusion, 25 mL, Intravenous, PRN, MD Tyree Yoo ON 7/23/2021] enoxaparin (LOVENOX) injection 40 mg, 40 mg, Subcutaneous, Daily, Sunshine Manrique MD    lactated ringers infusion, , Intravenous, Continuous, Sunshine Manrique MD    acetaminophen (TYLENOL) tablet 650 mg, 650 mg, Oral, Q4H PRN, Sunshine Manrique MD    bisacodyl (DULCOLAX) EC tablet 5 mg, 5 mg, Oral, Daily, Sunshine Manrique MD, 5 mg at 07/22/21 1223    ondansetron (ZOFRAN-ODT) disintegrating tablet 4 mg, 4 mg, Oral, Q8H PRN **OR** ondansetron (ZOFRAN) injection 4 mg, 4 mg, Intravenous, Q6H PRN, Sunshine Manrique MD, 4 mg at 07/22/21 1223    famotidine (PEPCID) tablet 20 mg, 20 mg, Oral, BID, Sunshine Manrique MD, 20 mg at 07/22/21 1223    ketorolac (TORADOL) injection 30 mg, 30 mg, Intravenous, Q6H, Sunshine Manrique MD, 30 mg at 07/22/21 1222    HYDROmorphone (DILAUDID) 1 MG/ML injection, , , ,   ALLERGIES     Patient has no known allergies.   There is no immunization history for the selected administration types on file for this patient. PAST MEDICAL HISTORY     Past Medical History:   Diagnosis Date    Diabetes mellitus ([address]     Hyperlipidemia      SURGICAL HISTORY       Past Surgical History:   Procedure Laterality Date    DILATION AND CURETTAGE OF UTERUS      TONSILLECTOMY       FAMILY HISTORY     No family history on file. SOCIAL HISTORY       Social History     Socioeconomic History    Marital status:      Spouse name: Not on file    Number of children: Not on file    Years of education: Not on file    Highest education level: Not on file   Occupational History    Not on file   Tobacco Use    Smoking status: Current Every Day Smoker     Packs/day: 1.00     Types: Cigarettes    Smokeless tobacco: Never Used   Substance and Sexual Activity    Alcohol use: Yes     Comment: occ    Drug use: No    Sexual activity: Never   Other Topics Concern    Not on file   Social History Narrative    Not on file     Social Determinants of Health     Financial Resource Strain:     Difficulty of Paying Living Expenses:    Food Insecurity:     Worried About Running Out of Food in the Last Year:     [address] in the Last Year:    Transportation Needs:     Lack of Transportation (Medical):      Lack of Transportation (Non-Medical):    Physical Activity:     Days of Exercise per Week:     Minutes of Exercise per Session:    Stress:     Feeling of Stress :    Social Connections:     Frequency of Communication with Friends and Family:     Frequency of Social Gatherings with Friends and Family:     Attends Hoahaoism Services:     Active Member of Clubs or Organizations:     Attends Club or Organization Meetings:     Marital Status:    Intimate Partner Violence:     Fear of Current or Ex-Partner:     Emotionally Abused:     Physically Abused:     Sexually Abused:      ·  lives with boyfriend has a dog  · 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[facility] PHYSICAL EXAM       ED Triage Vitals [07/22/21 0708]   BP Temp Temp Source Pulse Resp SpO2 Height Weight   (!) 104/50 97.5 °F (36.4 °C) Infrared 77 16 96 % 5' 4\" (1.626 m) 158 lb (71.7 kg)     Vitals:    Vitals:    07/22/21 1149 07/22/21 1150 07/22/21 1155 07/22/21 1157   BP:  (!) 119/59     Pulse: 92 91 91    Resp: 24 26 21    Temp:       TempSrc:       SpO2: 97% 98% 98% 98%   Weight:       Height:         Physical Exam   Constitutional/General: Alert and oriented, NAD  Head: NC/AT  Eyes: PERRL, EOMI  Mouth: Normal mucosa, no thrush   Neck: Supple, full ROM,    Pulmonary: Lungs clear to auscultation bilaterally. Not in respiratory distress  Cardiovascular:  Regular rate and rhythm, no murmurs, gallops, or rubs. Abdomen: Soft, + BS.   distension. tender. Extremities: Moves all extremities x 4. Warm and well perfused  Pulses:  Distal pulses intact  Skin: Warm and dry without rash  Neurologic:    No focal deficits  Psych: Normal Affect  Patel      DIAGNOSTIC RESULTS   RADIOLOGY:   No results found. LABS   COVID-19/WAQAR-COV2 LABS  No results for input(s): CRP, PROCAL, FERRITIN, LDH, TROPONINI, DDIMER, FIBRINOGEN, INR, PROTIME, AST, ALT, TRIG in the last 72 hours. Lab Results   Component Value Date    CHOL 82 03/01/2021    TRIG 222 03/01/2021    HDL 22 03/01/2021    LDLCALC 16 03/01/2021    LABVLDL 44 03/01/2021         Lab Results   Component Value Date    HEPAIGM Non-Reactive 11/01/2019    HEPBIGM Non-Reactive 11/01/2019    HCVABI Non-Reactive 11/01/2019     Hep C Ab Interp   Date Value Ref Range Status   11/01/2019 Non-Reactive NON REACT Final        MICROBIOLOGY:     Urine Culture, Routine   Date Value Ref Range Status   08/14/2019 Growth not present  Final      Patient is a 63 y.o. female who presented with No chief complaint on file.        FINAL IMPRESSION        SPLENOMEGALY, LEFT UPPER QUADRANT PAIN, THROMBOCYTOPENIA  S/p  Laparoscopic splenectomy  Id was asked to see for vaccination recommnedation    -ordered prevnar, hib and  bexsero to be given prior to d/c     Pt only had meningococcal prior to admission at  [facility]   Vaccine series should be started ideally 14 days before OR after splenectomy     - pneumococcal vaccine (PCV 13) IM before discharge   - meningococcal polysaccharide (ACWY) vaccine (Menactra) IM or Menveo in 2 months (2nd dose)  - BEXSERO SEROTYPE B 2 DOSES 1 MONTH APART   - HIB polysaccharide vaccine (ActHIB) IM before discharge     - FOLLOW UP 1 MONTH BEXSERO  - follow up at 2 months to get   1) pneumococcal polysaccharide vaccine (PPSV23)        - Pt will have to get second dose of PPSV23 at 5 years after the first dose and again at age 74.   - Need meningococcal vaccine booster every 5 years. - Avoid close contact with cats and dogs to avoid risk of severe infection from Capnocytophaga canimosus which is a commensal bacteria of dogs and cats saliva. Imaging and labs were reviewed per medical records and any ID pertinent labs were addressed with the patient. The patient/FAMILY  was educated about the diagnosis, prognosis, indications, risks and benefits of treatment. An opportunity to ask questions was given to the patient/FAMILY and questions were answered. Thank you for involving me in the care of Otoniel See. Please do not hesitate to call for any questions or concerns.          Electronically signed by Yuki Alfaro MD on 7/22/2021 at 1:13 PM

## 2021-07-22 NOTE — CONSULTS
Department of Internal Medicine  Internal Medicine Consultation Note    Primary Care Physician: Sejal Guajardo DO   Admitting Physician:  Santos Ramos MD  Admission date and time: 7/22/2021  6:48 AM    Room:  [facility]  Admitting diagnosis: Splenomegaly [R16.1]    Patient Name: Marcia Enrique  MRN: [id number]    Date of Service: 7/22/2021     Reason for consultation:  Medical management    History of present illness:    Treva Rivera is a 72-year-old female who was evaluated after undergoing elective splenectomy today. She admits to postoperative abdominal pain as to be expected. We reviewed her past medical history in the setting of enlarging splenomegaly being followed closely by the hematology team as an outpatient. This was complicated further by thrombocytopenia. Vaccinations have been discussed with the patient. Otherwise, she will be monitored overnight for adequate pain control. PAST MEDICAL Hx:  Past Medical History:   Diagnosis Date    Diabetes mellitus ([facility] [address]     Hyperlipidemia        PAST SURGICAL Hx:   Past Surgical History:   Procedure Laterality Date    DILATION AND CURETTAGE OF UTERUS      TONSILLECTOMY         FAMILY Hx:  No family history on file. HOME MEDICATIONS:  Prior to Admission medications    Medication Sig Start Date End Date Taking?  Authorizing Provider   Ertugliflozin L-PyroglutamicAc (STEGLATRO) 15 MG TABS Take 15 mg by mouth   Yes Historical Provider, MD   sennosides-docusate sodium (SENOKOT-S) 8.6-50 MG tablet Take 1 tablet by mouth nightly    Historical Provider, MD   Lancets ([address] [place]) [address] [facility] USE AS DIRECTED TWICE DAILY 4/25/21   Historical Provider, MD   atorvastatin (LIPITOR) 40 MG tablet Take 40 mg by mouth daily    Historical Provider, MD   linaclotide (LINZESS) 290 MCG CAPS capsule Take 145 mcg by mouth every morning (before breakfast)     Historical Provider, MD   Dulaglutide (TRULICITY) 1.5 RQ/5.9BR SOPN Inject 1.5 mg into the skin once a week     Historical Provider, MD   metFORMIN (GLUCOPHAGE) 1000 MG tablet Take 1,000 mg by mouth 2 times daily (with meals)    Historical Provider, MD   glyBURIDE (DIABETA) 2.5 MG tablet Take 5 mg by mouth 2 times daily (with meals)     Historical Provider, MD       ALLERGIES:  Patient has no known allergies. SOCIAL Hx:  Social History     Socioeconomic History    Marital status:      Spouse name: Not on file    Number of children: Not on file    Years of education: Not on file    Highest education level: Not on file   Occupational History    Not on file   Tobacco Use    Smoking status: Current Every Day Smoker     Packs/day: 1.00     Types: Cigarettes    Smokeless tobacco: Never Used   Substance and Sexual Activity    Alcohol use: Yes     Comment: occ    Drug use: No    Sexual activity: Never   Other Topics Concern    Not on file   Social History Narrative    Not on file     Social Determinants of Health     Financial Resource Strain:     Difficulty of Paying Living Expenses:    Food Insecurity:     Worried About Running Out of Food in the Last Year:     [address] in the Last Year:    Transportation Needs:     Lack of Transportation (Medical):      Lack of Transportation (Non-Medical):    Physical Activity:     Days of Exercise per Week:     Minutes of Exercise per Session:    Stress:     Feeling of Stress :    Social Connections:     Frequency of Communication with Friends and Family:     Frequency of Social Gatherings with Friends and Family:     Attends Synagogue Services:     Active Member of Clubs or Organizations:     Attends Club or Organization Meetings:     Marital Status:    Intimate Partner Violence:     Fear of Current or Ex-Partner:     Emotionally Abused:     Physically Abused:     Sexually Abused:        ROS:  General:   Denies chills, fatigue, fever, malaise, night sweats or weight loss    Psychological:   Denies anxiety, disorientation or There is no redness, warmth, or swelling of the joints. Full range of motion noted. Motor strength is 5 out of 5 all extremities bilaterally. Tone is normal.    NEUROLOGIC:    Awake, alert, oriented to name, place and time. Cranial nerves II-XII are grossly intact. Motor is 5 out of 5 bilaterally. SKIN:    No bruising or bleeding. No redness, warmth, or swelling    EXTREMITIES:    Peripheral pulses present. No edema, cyanosis, or swelling. LABORATORY DATA:  CBC with Differential:    Lab Results   Component Value Date    WBC 7.4 07/19/2021    RBC 5.85 07/19/2021    HGB 16.0 07/19/2021    HCT 49.6 07/19/2021     07/19/2021    MCV 84.8 07/19/2021    MCH 27.4 07/19/2021    MCHC 32.3 07/19/2021    RDW 14.8 07/19/2021    SEGSPCT 61 09/12/2013    LYMPHOPCT 27.2 03/22/2021    MONOPCT 6.6 03/22/2021    BASOPCT 1.0 03/22/2021    MONOSABS 0.61 03/22/2021    LYMPHSABS 2.50 03/22/2021    EOSABS 0.23 03/22/2021    BASOSABS 0.09 03/22/2021     BMP:    Lab Results   Component Value Date     07/19/2021    K 4.2 07/19/2021     07/19/2021    CO2 23 07/19/2021    BUN 17 07/19/2021    LABALBU 4.4 03/22/2021    LABALBU 4.2 04/18/2011    CREATININE 0.7 07/19/2021    CALCIUM 9.5 07/19/2021    GFRAA >60 07/19/2021    LABGLOM >60 07/19/2021    GLUCOSE 146 07/19/2021    GLUCOSE 316 04/18/2011       ASSESSMENT:  1. Status post splenectomy in the setting of splenomegaly and thrombocytopenia  2. Hyperlipidemia  3. Non-insulin-dependent diabetes mellitus type 2    PLAN:  Appropriate pain control be undertaken. We will wean off the nasal cannula oxygen and have encouraged early ambulation. Incentive spirometry is also been encouraged. She understands the vaccination protocol. Chronic comorbidities are being monitored. We will slowly resume diabetic medications pending her tolerance to a diet.     Salvatore Brennan DO, FACOI  1:13 PM  7/22/2021    Electronically signed by Salvatore Brennan DO on 7/22/21 at 1:13 PM EDT

## 2021-07-22 NOTE — OP NOTE
Operative Note      Patient: Magen Willis  YOB: 1960  MRN: [id number]    Date of Procedure: 7/22/2021    Pre-Op Diagnosis: SPLENOMEGALY, LEFT UPPER QUADRANT PAIN, THROMBOCYTOPENIA    Post-Op Diagnosis: Same       Procedure(s):  Laparoscopic splenectomy    Surgeon(s):  Mela Haro MD    Assistant:   First Assistant: Albina Lennox    Anesthesia: General    Estimated Blood Loss (mL): 696NH    Complications: None    Specimens:   ID Type Source Tests Collected by Time Destination   A : SPLEEN Tissue Tissue SURGICAL PATHOLOGY Mela Haro MD 7/22/2021 8324    B :  ACCESSORY SPLEEN  Tissue Tissue SURGICAL PATHOLOGY Mela Haro MD 7/22/2021 1033          Drains:   Urethral Catheter Non-latex (Active)   Catheter Indications Perioperative use for selected surgical procedures 07/22/21 1128   Urine Color Yellow 07/22/21 1128   Urine Appearance Clear 07/22/21 1128         Detailed Description of Procedure:   Is a 53-year-old female who presented with a history of thrombocytopenia left upper quadrant pain and splenomegaly. She has been treated by hematology with recommended splenectomy. After being spinners benefits and alternatives of procedure she agreed to proceed. She was taken the operating placed upon administered general anesthesia and intubated. When she was adequately sedated she was prepped and draped in normal sterile fashion. Timeout was performed to confirm surgical site the patient's name. Preoperatively we attempted vaccination however she did not complete her full vaccinations but she did receive the meningococcal vaccine 2 weeks prior to. She received preoperative antibiotics within 30 minutes of incision. She had bilateral PCVs placed a Patel catheter placed by the surgical team.  She was placed on a beanbag roll slightly tilted to her right and fractured the bed 15 degrees to separate the costal margin in the superior iliac spine.   I initially made a 5 mm incision in left upper quadrant inserted Veress needle confirmed to be in place and insufflated 15 mmHg. Then inserted a 5 mm trocar and inserted the camera fall. I could immediately appreciate splenomegaly and that was extending down below the costal margin on the left side. Another 5 mm trocar was inserted subcostal in the left side near the flank. A 12 mm trocar was inserted in the midline and another 5 mm trocar in the left upper quadrant. Initially identified the spleen taken down the attachments to the splenic flexure of the colon. Safely removing the colon and taken it away from the inferior pole of the spleen I then mobilized the lateral attachments of the spleen away from the abdominal wall and then slightly tilted the patient to the left side. We identified the inferior spleen using the LigaSure device work my way up to the vascular pedicle. Identified the splenic artery and vein. Short gastric vessels were taken away from the lateral wall the stomach and the superior pole the spleen as well using the LigaSure device. The pancreatic tail was adjacent to the hilum and this was kept safe and away from my dissection. I then used 3 separate loads of a white Fanshawe 60 mm stapler in order to staple across the pedicle. After completely transecting the blood supply to the spleen I then performed a cinnamon roll dissection of the spleen after placing it in a laparoscopic bag in order to facilitate removal through a small incision. LigaSure device was used to cinnamon roll into a long thin piece. I then closed laparoscopic bag inspected for hemostasis at the bed of the splenic resection. The vascular pedicles staple lines were intact and there was no oozing. I did place 1 piece of Surgicel snow over the staple line.   Lateral to the spleen I did appreciate on approaching the side one area that appeared to be an accessory spleen this was removed with the LigaSure device and passed off the table labeled accessory spleen. I then upsized my midline incision and placed a Clement wound protector device. I then pulled the back up open it using Clover forceps removed the cinnamon roll portion of the spleen. It was removed in piecemeal without any difficulty. It was passed off the table and labeled spleen. I then reinsufflated the abdomen inspected for hemostasis which was achieved. I then closed the 12 mm trocar site using an 0 Vicryl suture on a trocar closure device. We then remove each one of the trochars decompress the abdomen and closed the midline fascia using #1 PDS suture in a running fashion. 3-0 Vicryl was used to close the midline dermal stitches. 4 Monocryl was used to close each trocar site the patient was awoken expanded and transferred the postoperative care in stable condition. All instrument counts lap counts and needle counts were correct at completion of the procedure.     Electronically signed by Asif Dahl MD on 7/22/2021 at 11:36 AM

## 2021-07-22 NOTE — H&P
General Surgery History and Physical  [facility]    Patient's Name/Date of Birth: Alex Eubanks / 1960    Date: July 22, 2021     Surgeon: Sandra Grossman M.D.    PCP: Kiana Irving DO     Chief Complaint: splenomegaly, LUQ pain    HPI:   Alex Eubanks is a 63 y.o. female who presents for evaluation of left upper quadrant plain splenomegaly. Timing is intermittent, radiation to left upper quadrant, alleviated by none and started several months ago and severity is 8/10. Patient has been followed for thrombocytopenia with the [facility] and Dr. Magno Metzger. Per patient's report she has had decreasing levels of her platelet count as well as increasing size to her spleen. Her last platelet count was 147,880 in March and she has significant pain on the left side especially with physical activity. She does admit some intermittent shortness of breath associated with the pain. Denies any reflux nausea or vomiting. Patient Active Problem List   Diagnosis    Thrombocytopenia ([facility] [address]    Splenomegaly       Past Medical History:   Diagnosis Date    Diabetes mellitus ([facility] [address]     Hyperlipidemia        Past Surgical History:   Procedure Laterality Date    DILATION AND CURETTAGE OF UTERUS      TONSILLECTOMY         No Known Allergies    The patient has a family history that is negative for severe cardiovascular or respiratory issues, negative for reaction to anesthesia. Time spent reviewing past medical, surgical, social and family history, vitals, nursing assessment and images. No changes from above documented history. Social History     Socioeconomic History    Marital status:       Spouse name: Not on file    Number of children: Not on file    Years of education: Not on file    Highest education level: Not on file   Occupational History    Not on file   Tobacco Use    Smoking status: Current Every Day Smoker     Packs/day: 1.00     Types: Cigarettes    Smokeless tobacco: Never Used   Substance and Sexual Activity    Alcohol use: Yes     Comment: occ    Drug use: No    Sexual activity: Never   Other Topics Concern    Not on file   Social History Narrative    Not on file     Social Determinants of Health     Financial Resource Strain:     Difficulty of Paying Living Expenses:    Food Insecurity:     Worried About Running Out of Food in the Last Year:     [address] in the Last Year:    Transportation Needs:     Lack of Transportation (Medical):  Lack of Transportation (Non-Medical):    Physical Activity:     Days of Exercise per Week:     Minutes of Exercise per Session:    Stress:     Feeling of Stress :    Social Connections:     Frequency of Communication with Friends and Family:     Frequency of Social Gatherings with Friends and Family:     Attends Denominational Services:     Active Member of Clubs or Organizations:     Attends Club or Organization Meetings:     Marital Status:    Intimate Partner Violence:     Fear of Current or Ex-Partner:     Emotionally Abused:     Physically Abused:     Sexually Abused:        I have reviewed relevant labs from this admission and interpretation is included in my assessment and plan    Review of Systems    A complete 10 system review was performed and are otherwise negative unless mentioned in the above HPI. Specific negatives are listed below but may not include all those reviewed.     General ROS: negative obtundation, AMS  ENT ROS: negative rhinorrhea, epistaxis  Allergy and Immunology ROS: negative itchy/watery eyes or nasal congestion  Hematological and Lymphatic ROS: negative spontaneous bleeding or bruising  Endocrine ROS: negative  lethargy, mood swings, palpitations or polydipsia/polyuria  Respiratory ROS: negative sputum changes, stridor, tachypnea or wheezing  Cardiovascular ROS: negative for - loss of consciousness, murmur or orthopnea  Gastrointestinal ROS: negative for - hematochezia or hematemesis  Genito-Urinary ROS: negative for -  genital discharge or hematuria  Musculoskeletal ROS: negative for - focal weakness, gangrene  Psych/Neuro ROS: negative for - visual or auditory hallucinations, suicidal ideation    Physical exam:   BP (!) 104/50   Pulse 77   Temp 97.5 °F (36.4 °C) (Infrared)   Resp 16   Ht 5' 4\" (1.626 m)   Wt 158 lb (71.7 kg)   SpO2 96%   BMI 27.12 kg/m²   General appearance:  NAD, appears stated age  Head: NCAT, PERRLA, EOMI, red conjunctiva  Neck: supple, no masses, trachea midline  Lungs: Equal chest rise bilateral, no retractions, no wheezing  Heart: Reg rate  Abdomen: soft, tender left upper quadrant palpable spleen below the costal margin, nondistended  Skin; warm and dry, no cyanosis  Gu: no cva tenderness  Extremities: atraumatic, no focal motor deficits, no open wounds  Psych: No tremor, visual hallucinations      Radiology: I reviewed relevant abdominal imaging from this admission and that available in the EMR including CT abd/pel from March 2020. My assessment is splenomegaly with 1.3 cm cyst        Assessment:  Kelsi Reid is a 63 y.o. female with splenomegaly and consumptive coagulopathy with thrombocytopenia  Patient Active Problem List   Diagnosis    Thrombocytopenia ([facility] [address]    Splenomegaly         Plan:  Per recommendations of hematology will proceed with laparoscopic robot-assisted splenectomy  Pneumococcal, HIb and meningococcal vaccines to be administered 2 weeks prior to her surgery  She will need 2-month follow-up for second dose of her vaccines  I discussed with her the risk benefits alternatives to splenectomy including postoperative hematoma infection, I also discussed postsplenectomy sepsis and the risk that this entails.   Discussed with her the ability to prevent some of this with vaccinations  Also discussed with her the risks of blood transfusion or hemorrhage during surgery the risk of an open operation she agrees to proceed        Abdirahman Galvin

## 2021-07-22 NOTE — BRIEF OP NOTE
Brief Postoperative Note      Patient: Ben Whittington  YOB: 1960  MRN: [id number]    Date of Procedure: 7/22/2021    Pre-Op Diagnosis: SPLENOMEGALY, LEFT UPPER QUADRANT PAIN, THROMBOCYTOPENIA    Post-Op Diagnosis: Same       Procedure(s):  Laparoscopic splenectomy    Surgeon(s):  Milla Keane MD    Assistant:  First Assistant: Negra Butler    Anesthesia: General    Estimated Blood Loss (mL): 384GW    Complications: None    Specimens:   ID Type Source Tests Collected by Time Destination   A : SPLEEN Tissue Tissue SURGICAL PATHOLOGY Milla Keane MD 7/22/2021 9867    B :  ACCESSORY SPLEEN  Tissue Tissue SURGICAL PATHOLOGY Milla Keane MD 7/22/2021 1033            Findings: see op note    Electronically signed by Milla Keane MD on 7/22/2021 at 10:42 AM

## 2021-07-22 NOTE — ANESTHESIA POSTPROCEDURE EVALUATION
Department of Anesthesiology  Postprocedure Note    Patient: Kurtis Cyr  MRN: [id number]  YOB: 1960  Date of evaluation: 7/22/2021  Time:  1:10 PM     Procedure Summary     Date: 07/22/21 Room / Location: [address] [place] [address] / [address] [place] [address]    Anesthesia Start: 0901 Anesthesia Stop: 3977    Procedure: SPLENECTOMY,  LAPAROSCOPIC ROBOTIC XI (N/A ) Diagnosis: (SPLENOMEGALY, LEFT UPPER QUADRANT PAIN)    Surgeons: Antolin Lal MD Responsible Provider: Diamond Long MD    Anesthesia Type: general ASA Status: 3          Anesthesia Type: general    Julia Phase I: Julia Score: 9    Julia Phase II:      Last vitals: Reviewed and per EMR flowsheets.        Anesthesia Post Evaluation    Patient location during evaluation: PACU  Patient participation: complete - patient participated  Level of consciousness: awake and alert  Pain score: 3  Airway patency: patent  Nausea & Vomiting: no nausea and no vomiting  Complications: no  Cardiovascular status: hemodynamically stable  Respiratory status: acceptable  Hydration status: euvolemic

## 2021-07-23 LAB
ANION GAP SERPL CALCULATED.3IONS-SCNC: 11 MMOL/L (ref 7–16)
BASOPHILS ABSOLUTE: 0 E9/L (ref 0–0.2)
BASOPHILS RELATIVE PERCENT: 0.2 % (ref 0–2)
BUN BLDV-MCNC: 17 MG/DL (ref 6–20)
CALCIUM SERPL-MCNC: 8.6 MG/DL (ref 8.6–10.2)
CHLORIDE BLD-SCNC: 109 MMOL/L (ref 98–107)
CO2: 17 MMOL/L (ref 22–29)
CREAT SERPL-MCNC: 0.7 MG/DL (ref 0.5–1)
EOSINOPHILS ABSOLUTE: 0 E9/L (ref 0.05–0.5)
EOSINOPHILS RELATIVE PERCENT: 0 % (ref 0–6)
GFR AFRICAN AMERICAN: >60
GFR NON-AFRICAN AMERICAN: >60 ML/MIN/1.73
GLUCOSE BLD-MCNC: 158 MG/DL (ref 74–99)
HCT VFR BLD CALC: 43.3 % (ref 34–48)
HEMOGLOBIN: 13.5 G/DL (ref 11.5–15.5)
LYMPHOCYTES ABSOLUTE: 0.51 E9/L (ref 1.5–4)
LYMPHOCYTES RELATIVE PERCENT: 1.7 % (ref 20–42)
MCH RBC QN AUTO: 27.2 PG (ref 26–35)
MCHC RBC AUTO-ENTMCNC: 31.2 % (ref 32–34.5)
MCV RBC AUTO: 87.1 FL (ref 80–99.9)
METER GLUCOSE: 136 MG/DL (ref 74–99)
METER GLUCOSE: 156 MG/DL (ref 74–99)
METER GLUCOSE: 163 MG/DL (ref 74–99)
METER GLUCOSE: 216 MG/DL (ref 74–99)
MONOCYTES ABSOLUTE: 2.03 E9/L (ref 0.1–0.95)
MONOCYTES RELATIVE PERCENT: 7.8 % (ref 2–12)
NEUTROPHILS ABSOLUTE: 23.11 E9/L (ref 1.8–7.3)
NEUTROPHILS RELATIVE PERCENT: 90.5 % (ref 43–80)
OVALOCYTES: ABNORMAL
PDW BLD-RTO: 15 FL (ref 11.5–15)
PLATELET # BLD: 153 E9/L (ref 130–450)
PMV BLD AUTO: 11.6 FL (ref 7–12)
POIKILOCYTES: ABNORMAL
POTASSIUM REFLEX MAGNESIUM: 4.7 MMOL/L (ref 3.5–5)
RBC # BLD: 4.97 E12/L (ref 3.5–5.5)
SODIUM BLD-SCNC: 137 MMOL/L (ref 132–146)
WBC # BLD: 25.4 E9/L (ref 4.5–11.5)

## 2021-07-23 PROCEDURE — 97110 THERAPEUTIC EXERCISES: CPT

## 2021-07-23 PROCEDURE — 6370000000 HC RX 637 (ALT 250 FOR IP): Performed by: INTERNAL MEDICINE

## 2021-07-23 PROCEDURE — 82962 GLUCOSE BLOOD TEST: CPT

## 2021-07-23 PROCEDURE — 94640 AIRWAY INHALATION TREATMENT: CPT

## 2021-07-23 PROCEDURE — 6360000002 HC RX W HCPCS: Performed by: SURGERY

## 2021-07-23 PROCEDURE — 1200000000 HC SEMI PRIVATE

## 2021-07-23 PROCEDURE — 80048 BASIC METABOLIC PNL TOTAL CA: CPT

## 2021-07-23 PROCEDURE — 36415 COLL VENOUS BLD VENIPUNCTURE: CPT

## 2021-07-23 PROCEDURE — 97161 PT EVAL LOW COMPLEX 20 MIN: CPT

## 2021-07-23 PROCEDURE — 85025 COMPLETE CBC W/AUTO DIFF WBC: CPT

## 2021-07-23 PROCEDURE — 97535 SELF CARE MNGMENT TRAINING: CPT

## 2021-07-23 PROCEDURE — 97165 OT EVAL LOW COMPLEX 30 MIN: CPT

## 2021-07-23 PROCEDURE — 2580000003 HC RX 258: Performed by: SURGERY

## 2021-07-23 PROCEDURE — 6370000000 HC RX 637 (ALT 250 FOR IP): Performed by: SURGERY

## 2021-07-23 RX ORDER — IPRATROPIUM BROMIDE AND ALBUTEROL SULFATE 2.5; .5 MG/3ML; MG/3ML
1 SOLUTION RESPIRATORY (INHALATION) 4 TIMES DAILY
Status: DISCONTINUED | OUTPATIENT
Start: 2021-07-23 | End: 2021-07-25 | Stop reason: HOSPADM

## 2021-07-23 RX ORDER — IBUPROFEN 800 MG/1
800 TABLET ORAL EVERY 6 HOURS PRN
Qty: 90 TABLET | Refills: 1 | Status: SHIPPED | OUTPATIENT
Start: 2021-07-23 | End: 2021-08-06

## 2021-07-23 RX ORDER — OXYCODONE HYDROCHLORIDE AND ACETAMINOPHEN 5; 325 MG/1; MG/1
1 TABLET ORAL EVERY 6 HOURS PRN
Qty: 18 TABLET | Refills: 0 | Status: SHIPPED | OUTPATIENT
Start: 2021-07-23 | End: 2021-07-28

## 2021-07-23 RX ORDER — DOCUSATE SODIUM 100 MG/1
100 CAPSULE, LIQUID FILLED ORAL 2 TIMES DAILY
Qty: 30 CAPSULE | Refills: 0 | Status: SHIPPED | OUTPATIENT
Start: 2021-07-23 | End: 2021-08-07

## 2021-07-23 RX ORDER — ONDANSETRON 4 MG/1
4 TABLET, ORALLY DISINTEGRATING ORAL EVERY 8 HOURS PRN
Qty: 20 TABLET | Refills: 1 | Status: SHIPPED | OUTPATIENT
Start: 2021-07-23

## 2021-07-23 RX ADMIN — OXYCODONE HYDROCHLORIDE 10 MG: 5 TABLET ORAL at 04:05

## 2021-07-23 RX ADMIN — SODIUM CHLORIDE, POTASSIUM CHLORIDE, SODIUM LACTATE AND CALCIUM CHLORIDE: 600; 310; 30; 20 INJECTION, SOLUTION INTRAVENOUS at 09:36

## 2021-07-23 RX ADMIN — FAMOTIDINE 20 MG: 20 TABLET ORAL at 21:41

## 2021-07-23 RX ADMIN — INSULIN LISPRO 2 UNITS: 100 INJECTION, SOLUTION INTRAVENOUS; SUBCUTANEOUS at 13:27

## 2021-07-23 RX ADMIN — INSULIN LISPRO 1 UNITS: 100 INJECTION, SOLUTION INTRAVENOUS; SUBCUTANEOUS at 21:42

## 2021-07-23 RX ADMIN — IPRATROPIUM BROMIDE AND ALBUTEROL SULFATE 1 AMPULE: .5; 2.5 SOLUTION RESPIRATORY (INHALATION) at 16:01

## 2021-07-23 RX ADMIN — KETOROLAC TROMETHAMINE 30 MG: 30 INJECTION, SOLUTION INTRAMUSCULAR; INTRAVENOUS at 18:31

## 2021-07-23 RX ADMIN — BISACODYL 5 MG: 5 TABLET, COATED ORAL at 09:37

## 2021-07-23 RX ADMIN — OXYCODONE HYDROCHLORIDE 10 MG: 5 TABLET ORAL at 16:50

## 2021-07-23 RX ADMIN — ENOXAPARIN SODIUM 40 MG: 40 INJECTION SUBCUTANEOUS at 09:37

## 2021-07-23 RX ADMIN — KETOROLAC TROMETHAMINE 30 MG: 30 INJECTION, SOLUTION INTRAMUSCULAR; INTRAVENOUS at 05:58

## 2021-07-23 RX ADMIN — FAMOTIDINE 20 MG: 20 TABLET ORAL at 09:37

## 2021-07-23 RX ADMIN — KETOROLAC TROMETHAMINE 30 MG: 30 INJECTION, SOLUTION INTRAMUSCULAR; INTRAVENOUS at 12:06

## 2021-07-23 RX ADMIN — IPRATROPIUM BROMIDE AND ALBUTEROL SULFATE 1 AMPULE: .5; 2.5 SOLUTION RESPIRATORY (INHALATION) at 12:45

## 2021-07-23 RX ADMIN — IPRATROPIUM BROMIDE AND ALBUTEROL SULFATE 1 AMPULE: .5; 2.5 SOLUTION RESPIRATORY (INHALATION) at 08:50

## 2021-07-23 RX ADMIN — KETOROLAC TROMETHAMINE 30 MG: 30 INJECTION, SOLUTION INTRAMUSCULAR; INTRAVENOUS at 00:03

## 2021-07-23 RX ADMIN — INSULIN LISPRO 1 UNITS: 100 INJECTION, SOLUTION INTRAVENOUS; SUBCUTANEOUS at 16:47

## 2021-07-23 RX ADMIN — OXYCODONE HYDROCHLORIDE 10 MG: 5 TABLET ORAL at 21:59

## 2021-07-23 RX ADMIN — OXYCODONE HYDROCHLORIDE 10 MG: 5 TABLET ORAL at 10:35

## 2021-07-23 ASSESSMENT — PAIN SCALES - GENERAL
PAINLEVEL_OUTOF10: 3
PAINLEVEL_OUTOF10: 7
PAINLEVEL_OUTOF10: 7
PAINLEVEL_OUTOF10: 4
PAINLEVEL_OUTOF10: 5
PAINLEVEL_OUTOF10: 2
PAINLEVEL_OUTOF10: 7
PAINLEVEL_OUTOF10: 7
PAINLEVEL_OUTOF10: 1
PAINLEVEL_OUTOF10: 3

## 2021-07-23 ASSESSMENT — PAIN DESCRIPTION - PAIN TYPE: TYPE: SURGICAL PAIN

## 2021-07-23 ASSESSMENT — PAIN DESCRIPTION - DESCRIPTORS: DESCRIPTORS: ACHING;DISCOMFORT

## 2021-07-23 ASSESSMENT — PAIN DESCRIPTION - PROGRESSION: CLINICAL_PROGRESSION: NOT CHANGED

## 2021-07-23 ASSESSMENT — PAIN DESCRIPTION - FREQUENCY: FREQUENCY: CONTINUOUS

## 2021-07-23 ASSESSMENT — PAIN DESCRIPTION - LOCATION: LOCATION: ABDOMEN

## 2021-07-23 ASSESSMENT — PAIN - FUNCTIONAL ASSESSMENT: PAIN_FUNCTIONAL_ASSESSMENT: PREVENTS OR INTERFERES SOME ACTIVE ACTIVITIES AND ADLS

## 2021-07-23 ASSESSMENT — PAIN DESCRIPTION - ONSET: ONSET: GRADUAL

## 2021-07-23 NOTE — PROGRESS NOTES
GENERAL SURGERY  DAILY PROGRESS NOTE  7/23/2021  CC: Abdominal discomfort after robot assisted splenectomy    Subjective:  Reports that pain is better controlled this morning, has not ambulated since surgery, Patel catheter remains in place with good urine output, feels hungry this morning    Objective:  BP (!) 102/55   Pulse 74   Temp 98.9 °F (37.2 °C) (Oral)   Resp 16   Ht 5' 4\" (1.626 m)   Wt 158 lb (71.7 kg)   SpO2 97%   BMI 27.12 kg/m²     In: 2695 [P.O.:120; I.V.:2575]  Out: 2475 [Urine:2125]    General appearance: NAD, appears stated age  Head: NCAT, PERRLA, EOMI, red conjunctiva  Neck: supple, no masses, trachea midline  Lungs: Equal chest rise bilaterally, no retractions, no audible wheezing  Heart: Reg rate  Abdomen: soft, appropriately tender to palpation throughout without rebound or guarding, incisions are clean dry and intact, mildly distended  Skin: no obvious rashes or lesions appreciated  Gu: no cva tenderness  Extremities: atraumatic, no focal motor deficits, no open wounds  Psych: no tremor, visual hallucinations      Assessment/Plan:  63 y.o. female status post robot-assisted splenectomy for splenomegaly and thrombocytopenia.   Hemoglobin on CBC is normal this morning.    -Remove Patel catheter today  -Regular diet  -Pain control  -Ambulate, pulmonary hygiene  -If tolerates the above, discharge today    Jacinta Pereira MD  General Surgery Resident, PGY-4    Electronically signed on 7/23/2021 at 6:45 AM       Still weak to get up  Home tomorrow likely  Increase activity  Vaccines prior to Kimmy Lazcano MD, MS  Minimally Invasive and Bariatric Surgery  [phone] (p)  7/23/2021  12:54 PM

## 2021-07-23 NOTE — CARE COORDINATION
CM note; Met with patient and her SO, Anibal Shirley for transition of care planning. Pt lives with SO, is independent with ADLs, works at [employer] as a [employer]. Verified PCP and pharmacy preference with patient. No hx of DME/HHC. Pt had a splenectomy 7/22, ID on for vaccination needs, oxygen will need to be weaned off. Plan at this time is to return home with SO and he will provide transportation.

## 2021-07-23 NOTE — PROGRESS NOTES
[address] [facility]  [facility]. [place]        Date:2021                                                  Patient Name: Chetan Paul    MRN: [id number]    : 1960    Room: [facility]0331-01      Evaluating OT: Natanael Bucio OTR/L #[id number]     Referring Provider and Specific Provider Orders/Date:     OT Eval and treat, ordered on 21 by Dr. Merlinda Guess, DO     Placement Recommendation: HHOT        Diagnosis:   No diagnosis found.      Surgery:    Date of Procedure: 2021     Pre-Op Diagnosis: SPLENOMEGALY, LEFT UPPER QUADRANT PAIN, THROMBOCYTOPENIA     Post-Op Diagnosis: Same       Procedure(s):  Laparoscopic splenectomy     Surgeon(s):  Arthur Landa MD     Pertinent Medical History:       Past Medical History:   Diagnosis Date    Diabetes mellitus ([facility] [address]     Hyperlipidemia          Past Surgical History:   Procedure Laterality Date    COLECTOMY N/A 2021    SPLENECTOMY,  LAPAROSCOPIC ROBOTIC XI performed by Arthur Landa MD at [place] [address]        Precautions:  Fall Risk, decreased O2 saturations      Assessment of current deficits    [x] Functional mobility  [x]ADLs  [x] Strength               []Cognition    [x] Functional transfers   [x] IADLs         [] Safety Awareness   [x]Endurance    [] Fine Coordination              [x] Balance      [] Vision/perception   []Sensation     []Gross Motor Coordination  [] ROM  [] Delirium                   [] Motor Control     OT PLAN OF CARE   OT POC based on physician orders, patient diagnosis and results of clinical assessment    Frequency/Duration 1-3 days/wk for 2 weeks PRN     Specific OT Treatment Interventions to include:   * Instruction/training on adapted ADL techniques and AE recommendations to increase functional independence within precautions       * Training on energy conservation strategies, correct breathing pattern and techniques to improve independence/tolerance for self-care routine  * Functional transfer/mobility training/DME recommendations for increased independence, safety, and fall prevention  * Patient/Family education to increase follow through with safety techniques and functional independence  * Recommendation of environmental modifications for increased safety with functional transfers/mobility and ADLs  * Therapeutic exercise to improve motor endurance, ROM, and functional strength for ADLs/functional transfers  * Therapeutic activities to facilitate/challenge dynamic balance, stand tolerance for increased safety and independence with ADLs    Recommended Adaptive Equipment: Possible need for bedside commode. Home Living: lives with significant other; single family home, 2 story, 3 steps to enter no rail. Pt has a first floor set-up. Pt has a tub-shower. Equipment owned: wheeled walker     Prior Level of Function: Independent with ADLs , Independent with IADLs; ambulated without use of adaptive device    Driving: yes  Occupation: Works in dietary   Enjoys: [employer]      Pain Level: 6/10 pain in left lower quadrant; Nursing notified.       Cognition: A&O: 4/4; Follows 3 step directions   Memory: good    Sequencing: good    Problem solving: good    Judgement/safety: good     [facility]   AM-PAC Daily Activity Inpatient   How much help for putting on and taking off regular lower body clothing?: A Lot  How much help for Bathing?: A Lot  How much help for Toileting?: A Little  How much help for putting on and taking off regular upper body clothing?: A Little  How much help for taking care of personal grooming?: A Little  How much help for eating meals?: None  AM-PAC Inpatient Daily Activity Raw Score: 17  AM-PAC Inpatient ADL T-Scale Score : 37.26  ADL Inpatient CMS 0-100% Score: 50.11  ADL Inpatient CMS G-Code Modifier : CK     Functional Assessment:    Initial Eval Status  Date: 7/23/21 Treatment Status  Date: STGs = LTGs  Time frame: 10-14 days   Feeding Independent at tabletop while sitting at EOB. Independent    Grooming Supervision   Independent    UB Dressing Supervision   Independent    LB Dressing Maximal Assist   Time and effort needed for attempt to doff sock from seated position. Pt limited by pain. MAX A provided. Pt states she rarely wears socks and s/o will assist if needed once home. Pt not receptive to using AE. Independent    Bathing Moderate Assist   Independent    Toileting Supervision while seated on commode. Independent    Bed Mobility  Supine to sit: Supervision   Sit to supine: NT as patient was seated EOB at end of session. Supine to sit: Independent   Sit to supine: Independent    Functional Transfers Minimal Assist from EOB, progressing to CGA for sit to stand transfer at commode. Pt utilizing grab bars in bathroom. Transfer training with verbal cues for hand placement throughout session to improve safety. Independent    Functional Mobility Supervision without use of adaptive device to/from bathroom. No episodes of LOB. Independent    Balance Sitting:     Static: fair     Dynamic: fair   Standing: fair    Sitting:     Static: good     Dynamic: good   Standing: good     Activity Tolerance fair  minus; O2 sats at rest and with activity 88-90% on RA. Pt returned on 2L supplemental O2 and O2 sats increased to 92-94%. HR in 90s throughout. ZAC Brown informed/aware. Pt kept on 2L at end of session.    Increase standing tolerance >5 minutes for improved engagement with functional transfers and indep in ADLs   Visual/  Perceptual Glasses: Yes    Reports changes in vision since admission: No      NA            Hand Dominance: Right      AROM (PROM) Strength Additional Info:    RUE  WFL 4/5 good  and wfl FMC/dexterity noted during ADL tasks     LUE WFL 4/5 good  and wfl FMC/dexterity noted during ADL tasks       Hearing: [facility]   Sensation:  No c/o numbness or tingling  Tone: WFL   Edema: none    Comments: Nursing approved of therapy session. Upon arrival the patient was supine in bed. Pt educated on role of OT. At end of session, patient was sitting at EOB eating with call light and phone within reach, all lines and tubes intact. Overall patient demonstrated decreased independence and safety during completion of ADL/functional transfer/mobility tasks. Pt limited by deficits in strength, endurance, activity tolerance, and balance. Pt would benefit from continued skilled OT to increase safety and independence with completion of ADL/IADL tasks for functional independence and quality of life. Treatment: OT treatment provided this date includes:   · Instruction, education and training on safe facilitation and adapted techniques for completion of ADLs. These include safe functional transfer techniques and energy conservation/work simplification for completion of ADLs. Education provided on body mechanics for fall prevention. Cues for energy conservation and safety for in the home [place], including modifications and DME. Extended time to complete all tasks, including skilled monitoring of patient's response during treatment session and vital signs. Prior to and at the end of session, environmental modifications / line management completed for patients safety and efficiency of treatment session. See above for further details. Rehab Potential: Good for established goals. Patient / Family Goal: return home       Patient and/or family were instructed on functional diagnosis, prognosis/goals and OT plan of care. Demonstrated good understanding.      Eval Complexity: Low    Time In: 11:59 AM  Time Out: 12:36 PM    Total Treatment Time: 17      Min Units   OT Eval Low 97165  X  1    OT Eval Medium 45773      OT Eval High 70893      OT Re-Eval 26189            ADL/Self Care 17430 10 1   Therapeutic Activities 84663  7     Therapeutic Ex 07917       Orthotic Management 38048 Manual 98390     Neuro Re-Ed 98440       Non-Billable Time        Evaluation Time additionally includes thorough review of current medical information, gathering information on past medical history/social history and prior level of function, interpretation of standardized testing/informal observation of tasks, assessment of data and development of plan of care and goals.         Evaluating OT: Festus Mathis OTR/L #[id number]

## 2021-07-23 NOTE — PROGRESS NOTES
Internal Medicine Progress Note    [facility]    Leasonali Lees. Eliezer Schilder., F.A.CHomeroOHomeroIHomero Sow D.O., CONNOROANNA MARIE Yoon, MSN, APRN, NP-C  Che Medina. Rosalina Petty, MSN, APRN-CNP     Primary Care Physician: Sejal Guajardo DO   Admitting Physician:  Santos Ramos MD  Admission date and time: 7/22/2021  6:48 AM    Room:  [address] [place], [place] [address]  Admitting diagnosis: Splenomegaly [R16.1]    Patient Name: Marcia Enrique  MRN: [id number]    Date of Service: 7/23/2021     Subjective:  Treva Rivera is a 63 y.o. female who was seen and examined today,7/23/2021, at the bedside. Some pain. Relates to shortness of breath    No family present during my examination. Review of System:   Constitutional:   Denies fever or chills, weight loss or gain, fatigue or malaise. HEENT:   Denies ear pain, sore throat, sinus or eye problems. Cardiovascular:   Denies any chest pain, irregular heartbeats, or palpitations. Respiratory:   Denies shortness of breath, coughing, sputum production, hemoptysis, or wheezing. Gastrointestinal:   Denies nausea, vomiting, diarrhea, or constipation. Denies any abdominal pain. Genitourinary:    Denies any urgency, frequency, hematuria. Voiding  without difficulty. Extremities:   Denies lower extremity swelling, edema or cyanosis. Neurology:    Denies any headache or focal neurological deficits, Denies generalized weakness or memory difficulty. Psch:   Denies being anxious or depressed. Musculoskeletal:    Denies  myalgias, joint complaints or back pain. Integumentary:   Denies any rashes, ulcers, or excoriations. Denies bruising. Hematologic/Lymphatic:  Denies bruising or bleeding.     Physical Exam:  I/O this shift:  In: 240 [P.O.:240]  Out: 250 [Urine:250]    Intake/Output Summary (Last 24 hours) at 7/23/2021 1445  Last data filed at 7/23/2021 0936  Gross per 24 hour   Intake 1735 ml   Output 1450 ml   Net 285 ml   I/O last 3 completed shifts: In: 2695 [P.O.:120; I.V.:2575]  Out: 2475 [Urine:2125; Blood:350]  Patient Vitals for the past 96 hrs (Last 3 readings):   Weight   07/22/21 0708 158 lb (71.7 kg)     Vital Signs:   Blood pressure (!) 104/51, pulse 88, temperature 98.6 °F (37 °C), temperature source Oral, resp. rate 18, height 5' 4\" (1.626 m), weight 158 lb (71.7 kg), SpO2 93 %, not currently breastfeeding. General appearance:  Alert, responsive, oriented to person, place, and time. Well preserved, alert, no distress. Head:  Normocephalic. No masses, lesions or tenderness. Eyes:  PERRLA. EOMI. Sclera clear. Buccal mucosa moist.  ENT:  Ears normal. Mucosa normal.  Neck:    Supple. Trachea midline. No thyromegaly. No JVD. No bruits. Heart:    Rhythm regular. Rate controlled. No murmurs. Lungs:    Symmetrical. Clear to auscultation bilaterally. No wheezes. No rhonchi. No rales. Abdomen:   Soft. Non-tender. Non-distended. Bowel sounds positive. No organomegaly or masses. No pain on palpation. Extremities:    Peripheral pulses present. No peripheral edema. No ulcers. No cyanosis. No clubbing. Neurologic:    Alert x 3. No focal deficit. Cranial nerves grossly intact. No focal weakness. Psych:   Behavior is normal. Mood appears normal. Speech is not rapid and/or pressured. Musculoskeletal:   Spine ROM normal. Muscular strength intact. Gait not assessed. Integumentary:  No rashes  Skin normal color and texture.   Genitalia/Breast:  Deferred    Medication:  Scheduled Meds:   ipratropium-albuterol  1 ampule Inhalation 4x daily    sodium chloride flush  10 mL Intravenous 2 times per day    enoxaparin  40 mg Subcutaneous Daily    bisacodyl  5 mg Oral Daily    famotidine  20 mg Oral BID    ketorolac  30 mg Intravenous Q6H    insulin lispro  0-6 Units Subcutaneous TID WC    insulin lispro  0-3 Units Subcutaneous Nightly    pneumococcal 13-valent conjugate  0.5 mL Intramuscular Prior to discharge    meningococcal group B  0.5 mL Intramuscular Prior to discharge    haemophilus B conj-meningoccal  0.5 mL Intramuscular Prior to discharge     Continuous Infusions:   sodium chloride      dextrose         Objective Data:  CBC with Differential:    Lab Results   Component Value Date    WBC 25.4 07/23/2021    RBC 4.97 07/23/2021    HGB 13.5 07/23/2021    HCT 43.3 07/23/2021     07/23/2021    MCV 87.1 07/23/2021    MCH 27.2 07/23/2021    MCHC 31.2 07/23/2021    RDW 15.0 07/23/2021    SEGSPCT 61 09/12/2013    LYMPHOPCT 1.7 07/23/2021    MONOPCT 7.8 07/23/2021    BASOPCT 0.2 07/23/2021    MONOSABS 2.03 07/23/2021    LYMPHSABS 0.51 07/23/2021    EOSABS 0.00 07/23/2021    BASOSABS 0.00 07/23/2021     CMP:    Lab Results   Component Value Date     07/23/2021    K 4.7 07/23/2021     07/23/2021    CO2 17 07/23/2021    BUN 17 07/23/2021    CREATININE 0.7 07/23/2021    GFRAA >60 07/23/2021    LABGLOM >60 07/23/2021    GLUCOSE 158 07/23/2021    GLUCOSE 316 04/18/2011    PROT 6.9 03/22/2021    LABALBU 4.4 03/22/2021    LABALBU 4.2 04/18/2011    CALCIUM 8.6 07/23/2021    BILITOT 0.3 03/22/2021    ALKPHOS 90 03/22/2021    AST 15 03/22/2021    ALT 18 03/22/2021     Magnesium:  No results found for: MG  Phosphorus:  No results found for: PHOS           Assessment:    · Status post splenectomy in the setting of splenomegaly and thrombocytopenia  · Hyperlipidemia  · Non-insulin-dependent diabetes mellitus type 2  · Leukocytosis secondary to splenectomy  · Hypoxemia secondary atelectasis       Plan:     · Leukocytosis probably secondary to splenectomy  · Patient to be updated on vaccines. Being followed by infectious disease  · Encourage the use of incentive spirometry and ambulation  · Wean oxygen accordingly  · Glycemic control. More than 50% of my time was spent at the bedside counseling/coordinating care with the patient and/or family with face to face contact.   This time was spent reviewing notes and

## 2021-07-23 NOTE — PROGRESS NOTES
Physical Therapy Initial Evaluation/Plan of Care    Room #:  0331/0331-01  Patient Name: Ben Whittington  YOB: 1960  MRN: [id number]    Date of Service: 7/23/2021     Tentative placement recommendation: Home Health Physical Therapy   Equipment recommendation: To be determined      Evaluating Physical Therapist: Dudley Page #[phone]     Specific Provider Orders/Date/Referring Provider :   07/23/21 0845   PT eval and treat Start: 07/23/21 0845, End: 07/23/21 0845, ONE TIME, Standing Count: 1 Occurrences, R    EKK Sweet Teas, DO    Admitting Diagnosis:   Splenomegaly [R16.1]     Surgery: Procedure(s): 7/22/2021  Laparoscopic splenectomy   Surgeon(s): Milla Keane MD      Patient Active Problem List   Diagnosis    Thrombocytopenia ([facility] [address]    Splenomegaly    LUQ pain       ASSESSMENT of Current Deficits Patient exhibits decreased balance and endurance impairing transfers, gait distance and tolerance to activity. Patient sat up on EOB today with minimal assistance and amb around room a few times pushing IV pole on room air, SPO2 dropping to 86%, O2 placed back on patient recovering to 94%. Nursing notified. Patient does not wear O2 at home. Patient requires continued skilled physical therapy to address concerns listed above for increased safety and function at discharge.           PHYSICAL THERAPY  PLAN OF CARE       Physical therapy plan of care is established based on physician order,  patient diagnosis and clinical assessment    Current Treatment Recommendations:    -Bed Mobility: Lower extremity exercises   -Sitting Balance: Incorporate reaching activities to activate trunk muscles   -Standing Balance: Perform strengthening exercises in standing to promote motor control with or without upper extremity support   -Transfers: Provide instruction on proper hand and foot position for adequate transfer of weight onto lower extremities and use of gait device  -Gait: Gait training and Standing activities to improve: base of support, weight shift, weight bearing      PT long term treatment goals are located in below grid    Patient and or family understand(s) diagnosis, prognosis, and plan of care. Frequency of treatments: Patient will be seen  daily. Prior Level of Function: Patient ambulated independently   Rehab Potential: good  for baseline    Past medical history:   Past Medical History:   Diagnosis Date    Diabetes mellitus ([facility] [address]     Hyperlipidemia      Past Surgical History:   Procedure Laterality Date    COLECTOMY N/A 7/22/2021    SPLENECTOMY,  LAPAROSCOPIC ROBOTIC XI performed by Inna Yin MD at [address]:    Precautions: Up with assistance, falls ,   Social history: Patient lives with boyfriend Bijal Shay in a two story home resides first  with 2 steps  to enter without Rail  Tub shower (walk in shower in basement, bedroom and bathroom in basement as well, usually sleeps down basement)     Equipment owned: Wheelchair, Wheeled Walker, Bedside commode and Hand held shower head,      [address]   How much difficulty turning over in bed?: A Little  How much difficulty sitting down on / standing up from a chair with arms?: A Little  How much difficulty moving from lying on back to sitting on side of bed?: A Little  How much help from another person moving to and from a bed to a chair?: A Little  How much help from another person needed to walk in hospital room?: A Little  How much help from another person for climbing 3-5 steps with a railing?: A Little  AM-PAC Inpatient Mobility Raw Score : 18  AM-PAC Inpatient T-Scale Score : 43.63  Mobility Inpatient CMS 0-100% Score: 46.58  Mobility Inpatient CMS G-Code Modifier : CK    Nursing cleared patient for PT evaluation.  The admitting diagnosis and active problem list as listed above have been reviewed prior to the initiation of this evaluation. OBJECTIVE;   Initial Evaluation  Date: 7/23/2021 Treatment Date:     Short Term/ Long Term   Goals   Was pt agreeable to Eval/treatment? Yes   To be met in 4 days   Pain level   6/10  Upper chest      Bed Mobility    Rolling: Minimal assist of 1   Supine to sit: Minimal assist of 1   Sit to supine: Minimal assist of 1   Scooting: Minimal assist of 1   Rolling: Independent   Supine to sit:  Independent   Sit to supine: Independent   Scooting: Independent    Transfers Sit to stand: Minimal assist of 1 x2 reps  Sit to stand: Independent    Ambulation    2x25 feet using  IV pole with Supervision    for upright and safety   100 feet using  no device with Independent    Stair negotiation: ascended and descended   Not assessed      2 steps with HR and supervision   ROM Within functional limits      Strength BUE:  4+/5  RLE:  4+/5  LLE:  4+/5  Increase strength in affected mm groups by 1/3 grade   Balance Sitting EOB:  good -  Dynamic Standing:  fair   Sitting EOB:  good   Dynamic Standing: good      Patient is Alert & Oriented x person, place, time and situation and follows directions   Sensation:  Patient  denies numbness/tingling     Edema:  yes left upper extremity  Endurance: fair  +    Vitals: 4 liters nasal cannula   Blood Pressure at rest  Blood Pressure during session    Heart Rate at rest  Heart Rate during session    SPO2 at rest 94%  SPO2 during session on room air 86-90% during gait        Patient education  Patient educated on role of Physical Therapy, risks of immobility, safety and plan of care,  importance of mobility while in hospital , purse lip breathing, ankle pumps, quad set and glut set for edema control, blood clot prevention and safety      Patient response to education:   Pt verbalized understanding Pt demonstrated skill Pt requires further education in this area   Yes Partial Yes      Treatment:  Patient practiced and was instructed/facilitated in the following treatment: Patient assisted to EOB. Sat edge of bed 15 minutes with Minimal assist of 1 to increase dynamic sitting balance and activity tolerance. progressing to supervision. Patient stood and reported dizziness. SPO2 88% on room air. O2 placed back on patient and sat down. Edu on breathing. Patient stood again, no dizziness on room air and amb around room with IV pole and supervision. Patient sat down in chair, SPO2 86%, recovering to 89%. O2 placed back on patient. Patient performed exercises. Therapeutic Exercises:  ankle pumps, glut sets, long arc quad and seated marching  x 15 reps. At end of session, patient in chair with boyfriend Davin Hurley present call light and phone within reach,  all lines and tubes intact, nursing notified. Patient would benefit from continued skilled Physical Therapy to improve functional independence and quality of life. Patient's/ family goals   get stronger      Time in  1002  Time out  1032    Total Treatment Time  10 minutes    Evaluation time includes thorough review of current medical information, gathering information on past medical history/social history and prior level of function, completion of standardized testing/informal observation of tasks, assessment of data, and development of Plan of care and goals.      CPT codes:  Low Complexity PT evaluation (88321)  Therapeutic exercises (66639)   10 minutes  1 unit(s)    Dillon Shepherd, PT

## 2021-07-23 NOTE — PROGRESS NOTES
[address] [place] [address] [facility]  [facility]  Progress Note  CC: splenectomy   Face to face encounter   SUBJECTIVE:  Patient is tolerating medications. No reported adverse drug reactions. ROS: No nausea, vomiting, diarrhea. Some abdominal discomfort  No fevers    Visitor at bedside.      Medications:  Scheduled Meds:   ipratropium-albuterol  1 ampule Inhalation 4x daily    sodium chloride flush  10 mL Intravenous 2 times per day    enoxaparin  40 mg Subcutaneous Daily    bisacodyl  5 mg Oral Daily    famotidine  20 mg Oral BID    ketorolac  30 mg Intravenous Q6H    insulin lispro  0-6 Units Subcutaneous TID WC    insulin lispro  0-3 Units Subcutaneous Nightly    pneumococcal 13-valent conjugate  0.5 mL Intramuscular Prior to discharge    meningococcal group B  0.5 mL Intramuscular Prior to discharge    haemophilus B conj-meningoccal  0.5 mL Intramuscular Prior to discharge     Continuous Infusions:   sodium chloride      lactated ringers 125 mL/hr at 07/23/21 0936    dextrose       PRN Meds:oxyCODONE **OR** oxyCODONE, morphine, sodium chloride flush, sodium chloride, acetaminophen, ondansetron **OR** ondansetron, glucose, dextrose, glucagon (rDNA), dextrose  OBJECTIVE:  Patient Vitals for the past 24 hrs:   BP Temp Temp src Pulse Resp SpO2   07/23/21 1002 (!) 104/51 98.6 °F (37 °C) Oral 88 18    07/23/21 0545 (!) 102/55 98.9 °F (37.2 °C) Oral 74 16 97 %   07/23/21 0203 (!) 104/55 99.1 °F (37.3 °C) Oral 75 18 96 %   07/22/21 2156 (!) 106/54 98.2 °F (36.8 °C) Oral 86 16    07/22/21 1830 (!) 109/53 97.8 °F (36.6 °C) Oral 76 16 97 %   07/22/21 1603 (!) 100/53 97.6 °F (36.4 °C) Oral 100 20    07/22/21 1157      98 %   07/22/21 1155    91 21 98 %   07/22/21 1150 (!) 119/59   91 26 98 %   07/22/21 1149    92 24 97 %   07/22/21 1148    91 27 98 %   07/22/21 1147    91 29 98 %   07/22/21 1146    91 25 98 %   07/22/21 1145    91 28 98 %   07/22/21 1144    92 30 98 % 07/22/21 1143    93 23 98 %   07/22/21 1142    92 20 99 %   07/22/21 1141    92 19 99 %   07/22/21 1140 126/61   94 27 100 %   07/22/21 1139    91 29 100 %   07/22/21 1138    92 22 100 %   07/22/21 1137    93 28 100 %   07/22/21 1136    91 22 100 %   07/22/21 1135    93 (!) 31 100 %   07/22/21 1134    94 (!) 31 100 %   07/22/21 1133    93 29 99 %   07/22/21 1132 (!) 124/94 97.1 °F (36.2 °C) Temporal 92 25 100 %   07/22/21 1131    95 25 100 %   07/22/21 1130 (!) 124/94   93 (!) 36 100 %   07/22/21 1129    93 27 100 %   07/22/21 1128    95 24 100 %   07/22/21 1127    95 (!) 34 100 %   07/22/21 1126    95 (!) 38 100 %   07/22/21 1125    95 26 100 %   07/22/21 1124    95 (!) 33 100 %   07/22/21 1123    95 25 100 %   07/22/21 1122    95 28 100 %   07/22/21 1121    95 (!) 31 100 %   07/22/21 1120 138/63   95 21 100 %   07/22/21 1119    96 18 100 %   07/22/21 1118    95 16 100 %   07/22/21 1117    96 27 100 %   07/22/21 1116    95 15 100 %   07/22/21 1115    96 20 100 %   07/22/21 1114    96 18 100 %   07/22/21 1113    95 (!) 31 100 %   07/22/21 1112    95 20 99 %   07/22/21 1111    96 21 99 %   07/22/21 1110    96 21 99 %   07/22/21 1109 (!) 142/84   96 27 99 %     Constitutional: The patient is awake, alert, and oriented. Resting in bed   Skin: Warm and dry. No rashes were noted. Head: Eyes show round, and reactive pupils. No jaundice. Mouth: Moist mucous membranes, no ulcerations, no thrush. Neck: Supple to movements. No lymphadenopathy. Chest: No use of accessory muscles to breathe. Symmetrical expansion. Auscultation reveals no wheezing, crackles, or rhonchi. Cardiovascular: S1 and S2 are rhythmic and regular. No murmurs appreciated. Abdomen: Positive bowel sounds to auscultation. Some tenderness along incision line   Extremities: No clubbing, no cyanosis, no edema.     Laboratory and Tests Review:  Lab bacteria of dogs and cats saliva. · Monitor labs  · Ok to discharge from [place], APRN - CNS  11:05 AM  7/23/2021       As above    This is a face to face encounter with Jennifer Verde on 07/23/21. I have performed and participated in the history, exam, medical decision making, and  POC with the NURSE PRACTITIONER, ABDIRIZAK Butts. Pt in megan pain better  Discussed vaccinations as outpt  And reinforce risk of bacterial infection with splenectomy   Pt aware  Imaging and labs were reviewed. Jennifer Verde was informed of their diagnosis, indications, risks and benefits of treatment. Jennifer Verde had the opportunity to ask questions. All questions were answered. Thank you for involving me in the care of Jennifer Verde. Please do not hesitate to call for any questions or concerns.     Electronically signed by Torres Bermeo MD on 7/23/2021 at 2:43 PM    Phone [phone]  Fax [phone]

## 2021-07-24 ENCOUNTER — APPOINTMENT (OUTPATIENT)
Dept: GENERAL RADIOLOGY | Age: 61
DRG: 650 | End: 2021-07-24
Attending: SURGERY
Payer: MEDICAID

## 2021-07-24 LAB
ALBUMIN SERPL-MCNC: 3.4 G/DL (ref 3.5–5.2)
ALP BLD-CCNC: 84 U/L (ref 35–104)
ALT SERPL-CCNC: 14 U/L (ref 0–32)
ANION GAP SERPL CALCULATED.3IONS-SCNC: 9 MMOL/L (ref 7–16)
AST SERPL-CCNC: 13 U/L (ref 0–31)
BASOPHILS ABSOLUTE: 0.08 E9/L (ref 0–0.2)
BASOPHILS RELATIVE PERCENT: 0.4 % (ref 0–2)
BILIRUB SERPL-MCNC: 0.4 MG/DL (ref 0–1.2)
BUN BLDV-MCNC: 13 MG/DL (ref 6–20)
CALCIUM SERPL-MCNC: 8.8 MG/DL (ref 8.6–10.2)
CHLORIDE BLD-SCNC: 106 MMOL/L (ref 98–107)
CO2: 23 MMOL/L (ref 22–29)
CREAT SERPL-MCNC: 0.6 MG/DL (ref 0.5–1)
EOSINOPHILS ABSOLUTE: 0.1 E9/L (ref 0.05–0.5)
EOSINOPHILS RELATIVE PERCENT: 0.5 % (ref 0–6)
GFR AFRICAN AMERICAN: >60
GFR NON-AFRICAN AMERICAN: >60 ML/MIN/1.73
GLUCOSE BLD-MCNC: 128 MG/DL (ref 74–99)
HCT VFR BLD CALC: 39.7 % (ref 34–48)
HEMOGLOBIN: 12.5 G/DL (ref 11.5–15.5)
IMMATURE GRANULOCYTES #: 0.16 E9/L
IMMATURE GRANULOCYTES %: 0.8 % (ref 0–5)
LYMPHOCYTES ABSOLUTE: 1 E9/L (ref 1.5–4)
LYMPHOCYTES RELATIVE PERCENT: 4.7 % (ref 20–42)
MCH RBC QN AUTO: 27.3 PG (ref 26–35)
MCHC RBC AUTO-ENTMCNC: 31.5 % (ref 32–34.5)
MCV RBC AUTO: 86.7 FL (ref 80–99.9)
METER GLUCOSE: 149 MG/DL (ref 74–99)
METER GLUCOSE: 155 MG/DL (ref 74–99)
METER GLUCOSE: 161 MG/DL (ref 74–99)
METER GLUCOSE: 198 MG/DL (ref 74–99)
MONOCYTES ABSOLUTE: 2.5 E9/L (ref 0.1–0.95)
MONOCYTES RELATIVE PERCENT: 11.8 % (ref 2–12)
NEUTROPHILS ABSOLUTE: 17.29 E9/L (ref 1.8–7.3)
NEUTROPHILS RELATIVE PERCENT: 81.8 % (ref 43–80)
PDW BLD-RTO: 15 FL (ref 11.5–15)
PLATELET # BLD: 174 E9/L (ref 130–450)
PMV BLD AUTO: 11.6 FL (ref 7–12)
POTASSIUM SERPL-SCNC: 3.9 MMOL/L (ref 3.5–5)
PROCALCITONIN: 0.51 NG/ML (ref 0–0.08)
RBC # BLD: 4.58 E12/L (ref 3.5–5.5)
SODIUM BLD-SCNC: 138 MMOL/L (ref 132–146)
TOTAL PROTEIN: 5.8 G/DL (ref 6.4–8.3)
WBC # BLD: 21.1 E9/L (ref 4.5–11.5)

## 2021-07-24 PROCEDURE — 6360000002 HC RX W HCPCS: Performed by: SURGERY

## 2021-07-24 PROCEDURE — 2700000000 HC OXYGEN THERAPY PER DAY

## 2021-07-24 PROCEDURE — 94640 AIRWAY INHALATION TREATMENT: CPT

## 2021-07-24 PROCEDURE — 6370000000 HC RX 637 (ALT 250 FOR IP): Performed by: INTERNAL MEDICINE

## 2021-07-24 PROCEDURE — 82962 GLUCOSE BLOOD TEST: CPT

## 2021-07-24 PROCEDURE — 1200000000 HC SEMI PRIVATE

## 2021-07-24 PROCEDURE — 6360000002 HC RX W HCPCS: Performed by: INTERNAL MEDICINE

## 2021-07-24 PROCEDURE — 85025 COMPLETE CBC W/AUTO DIFF WBC: CPT

## 2021-07-24 PROCEDURE — 6370000000 HC RX 637 (ALT 250 FOR IP): Performed by: SURGERY

## 2021-07-24 PROCEDURE — 71046 X-RAY EXAM CHEST 2 VIEWS: CPT

## 2021-07-24 PROCEDURE — U0003 INFECTIOUS AGENT DETECTION BY NUCLEIC ACID (DNA OR RNA); SEVERE ACUTE RESPIRATORY SYNDROME CORONAVIRUS 2 (SARS-COV-2) (CORONAVIRUS DISEASE [COVID-19]), AMPLIFIED PROBE TECHNIQUE, MAKING USE OF HIGH THROUGHPUT TECHNOLOGIES AS DESCRIBED BY CMS-2020-01-R: HCPCS

## 2021-07-24 PROCEDURE — 80053 COMPREHEN METABOLIC PANEL: CPT

## 2021-07-24 PROCEDURE — 36415 COLL VENOUS BLD VENIPUNCTURE: CPT

## 2021-07-24 PROCEDURE — 84145 PROCALCITONIN (PCT): CPT

## 2021-07-24 PROCEDURE — U0005 INFEC AGEN DETEC AMPLI PROBE: HCPCS

## 2021-07-24 PROCEDURE — 2580000003 HC RX 258: Performed by: SURGERY

## 2021-07-24 RX ORDER — ARFORMOTEROL TARTRATE 15 UG/2ML
15 SOLUTION RESPIRATORY (INHALATION) 2 TIMES DAILY
Status: DISCONTINUED | OUTPATIENT
Start: 2021-07-24 | End: 2021-07-25 | Stop reason: HOSPADM

## 2021-07-24 RX ORDER — BUDESONIDE 0.5 MG/2ML
0.5 INHALANT ORAL 2 TIMES DAILY
Status: DISCONTINUED | OUTPATIENT
Start: 2021-07-24 | End: 2021-07-25 | Stop reason: HOSPADM

## 2021-07-24 RX ORDER — BUDESONIDE AND FORMOTEROL FUMARATE DIHYDRATE 160; 4.5 UG/1; UG/1
2 AEROSOL RESPIRATORY (INHALATION) 2 TIMES DAILY
Status: DISCONTINUED | OUTPATIENT
Start: 2021-07-24 | End: 2021-07-24 | Stop reason: CLARIF

## 2021-07-24 RX ORDER — FUROSEMIDE 10 MG/ML
20 INJECTION INTRAMUSCULAR; INTRAVENOUS ONCE
Status: COMPLETED | OUTPATIENT
Start: 2021-07-24 | End: 2021-07-24

## 2021-07-24 RX ADMIN — ARFORMOTEROL TARTRATE 15 MCG: 15 SOLUTION RESPIRATORY (INHALATION) at 09:33

## 2021-07-24 RX ADMIN — IPRATROPIUM BROMIDE AND ALBUTEROL SULFATE 1 AMPULE: .5; 2.5 SOLUTION RESPIRATORY (INHALATION) at 06:38

## 2021-07-24 RX ADMIN — IPRATROPIUM BROMIDE AND ALBUTEROL SULFATE 1 AMPULE: .5; 2.5 SOLUTION RESPIRATORY (INHALATION) at 09:33

## 2021-07-24 RX ADMIN — SODIUM CHLORIDE, PRESERVATIVE FREE 10 ML: 5 INJECTION INTRAVENOUS at 17:49

## 2021-07-24 RX ADMIN — INSULIN LISPRO 1 UNITS: 100 INJECTION, SOLUTION INTRAVENOUS; SUBCUTANEOUS at 20:22

## 2021-07-24 RX ADMIN — OXYCODONE HYDROCHLORIDE 5 MG: 5 TABLET ORAL at 05:15

## 2021-07-24 RX ADMIN — BUDESONIDE 500 MCG: 0.5 INHALANT RESPIRATORY (INHALATION) at 18:44

## 2021-07-24 RX ADMIN — INSULIN LISPRO 1 UNITS: 100 INJECTION, SOLUTION INTRAVENOUS; SUBCUTANEOUS at 08:58

## 2021-07-24 RX ADMIN — INSULIN LISPRO 1 UNITS: 100 INJECTION, SOLUTION INTRAVENOUS; SUBCUTANEOUS at 12:23

## 2021-07-24 RX ADMIN — ENOXAPARIN SODIUM 40 MG: 40 INJECTION SUBCUTANEOUS at 09:01

## 2021-07-24 RX ADMIN — INSULIN LISPRO 1 UNITS: 100 INJECTION, SOLUTION INTRAVENOUS; SUBCUTANEOUS at 17:09

## 2021-07-24 RX ADMIN — SODIUM CHLORIDE, PRESERVATIVE FREE 10 ML: 5 INJECTION INTRAVENOUS at 08:56

## 2021-07-24 RX ADMIN — SODIUM CHLORIDE, PRESERVATIVE FREE 10 ML: 5 INJECTION INTRAVENOUS at 12:20

## 2021-07-24 RX ADMIN — IPRATROPIUM BROMIDE AND ALBUTEROL SULFATE 1 AMPULE: .5; 2.5 SOLUTION RESPIRATORY (INHALATION) at 18:45

## 2021-07-24 RX ADMIN — KETOROLAC TROMETHAMINE 30 MG: 30 INJECTION, SOLUTION INTRAMUSCULAR; INTRAVENOUS at 06:30

## 2021-07-24 RX ADMIN — FUROSEMIDE 20 MG: 10 INJECTION, SOLUTION INTRAMUSCULAR; INTRAVENOUS at 09:00

## 2021-07-24 RX ADMIN — FAMOTIDINE 20 MG: 20 TABLET ORAL at 20:20

## 2021-07-24 RX ADMIN — ARFORMOTEROL TARTRATE 15 MCG: 15 SOLUTION RESPIRATORY (INHALATION) at 18:45

## 2021-07-24 RX ADMIN — FAMOTIDINE 20 MG: 20 TABLET ORAL at 08:58

## 2021-07-24 RX ADMIN — BUDESONIDE 500 MCG: 0.5 INHALANT RESPIRATORY (INHALATION) at 09:33

## 2021-07-24 RX ADMIN — KETOROLAC TROMETHAMINE 30 MG: 30 INJECTION, SOLUTION INTRAMUSCULAR; INTRAVENOUS at 17:48

## 2021-07-24 RX ADMIN — BISACODYL 5 MG: 5 TABLET, COATED ORAL at 08:57

## 2021-07-24 RX ADMIN — KETOROLAC TROMETHAMINE 30 MG: 30 INJECTION, SOLUTION INTRAMUSCULAR; INTRAVENOUS at 12:20

## 2021-07-24 ASSESSMENT — PAIN DESCRIPTION - PROGRESSION: CLINICAL_PROGRESSION: RAPIDLY IMPROVING

## 2021-07-24 ASSESSMENT — PAIN SCALES - GENERAL
PAINLEVEL_OUTOF10: 0
PAINLEVEL_OUTOF10: 5
PAINLEVEL_OUTOF10: 6
PAINLEVEL_OUTOF10: 4
PAINLEVEL_OUTOF10: 0
PAINLEVEL_OUTOF10: 6

## 2021-07-24 NOTE — SIGNIFICANT EVENT
Pt ambulated from bed to bathroom on room air. When patient arrived back to bed o2 was 85% on room air. 2 liters of oxygen was applied. Patient remained 89% on the 2 Liters so oxygen was increased to 3 liters and IS was encouraged. Patient is currently 91% on the 3 liters sitting on side of bed. Bed alarm on. No complaints of shortness of breath.

## 2021-07-24 NOTE — PROGRESS NOTES
[facility] 84 Day # 2  7/24/2021       Archie Moreland is a 63 y.o. female post splenectomy by Dr. Prem Pierre 7/22/21. Awake and alert, minimal pain, says she is breathing fine but O2 sat is 90% on nasal oxygen, heart rate 105. Ambulating to bathroom, wants to try to ambulate in the halls with assistance. Good urine output. Labs:   Recent Labs     07/23/21  0411 07/24/21  0504   WBC 25.4* 21.1*   HGB 13.5 12.5    174    138   * 106   K 4.7 3.9   BUN 17 13   CREATININE 0.7 0.6   GLUCOSE 158* 128*   LABALBU  --  3.4*   PROT  --  5.8*   CALCIUM 8.6 8.8   BILITOT  --  0.4   ALKPHOS  --  84   AST  --  13   ALT  --  14     Physical exam:   VITALS: Blood pressure (!) 135/58, pulse 89, temperature 98.6 °F (37 °C), temperature source Oral, resp. rate 24, height 5' 4\" (1.626 m), weight 158 lb (71.7 kg), SpO2 92 %, not currently breastfeeding. General appearance: alert, appears stated age and cooperative, does not ambulate easily  Head: Normocephalic, without obvious abnormality, atraumatic  Eyes: PERRL  Ears/mouth/throat:  Ears clear, mouth normal, throat no redness  Neck: no adenopathy, no JVD, supple, symmetrical, trachea midline and thyroid not enlarged  Chest: Breath sounds were clear and equal with no rales, wheezes, or rhonchi. Respiratory effort was normal with no retractions or use of accessory muscles. Cardiovascular: Heart sounds were normal with a regular rate and rhythm. There were no murmurs or gallops. Abdomen: Bowel sounds hypoactive, Incisions clean and dry, glue in place. Mild incisional tenderness. Mild bruising. Extremeties: no edema, no calf pain    Assessment:    Hemodynamically stable post splenectomy but low oxygen saturations on nasal canula    Plan:   Deep breathing exercises, ambulate in halls with assistance, home once the oxygenation comes up.       Physician Signature: Electronically signed by Dr. James Caban MD  7/24/2021

## 2021-07-24 NOTE — PROGRESS NOTES
Resting Room air O2 Sat.  88% Hr.96  Ambulating Room ai 86% Hr. 114  Resting @ 3L oxygen NC 93%, Hr 92  Ambulating @ 3L nc 90% Hr.110

## 2021-07-24 NOTE — PROGRESS NOTES
[facility]  Progress Note    NAME: Triny Tinoco  MR:  [id number]  :   1960  DATE OF SERVICE:21    This is a face to face encounter with Triny Tinoco 63 y.o. female on 21    CHIEF COMPLAINT     ID following for No chief complaint on file. HISTORY OF PRESENT ILLNESS   Pt seen and examined  21  C/O CONSTIPATION   NO F/C  ON O2 WAS SOB THIS AM     Patient is tolerating medications. No reported adverse drug reactions. REVIEW OF SYSTEMS     As stated above in the chief complaint, otherwise negative. CURRENT MEDICATIONS      furosemide  20 mg Intravenous Once    Arformoterol Tartrate  15 mcg Nebulization BID    And    budesonide  0.5 mg Nebulization BID    ipratropium-albuterol  1 ampule Inhalation 4x daily    sodium chloride flush  10 mL Intravenous 2 times per day    enoxaparin  40 mg Subcutaneous Daily    bisacodyl  5 mg Oral Daily    famotidine  20 mg Oral BID    ketorolac  30 mg Intravenous Q6H    insulin lispro  0-6 Units Subcutaneous TID WC    insulin lispro  0-3 Units Subcutaneous Nightly    pneumococcal 13-valent conjugate  0.5 mL Intramuscular Prior to discharge    meningococcal group B  0.5 mL Intramuscular Prior to discharge    haemophilus B conj-meningoccal  0.5 mL Intramuscular Prior to discharge     Continuous Infusions:   sodium chloride      dextrose       PRN Meds:oxyCODONE **OR** oxyCODONE, morphine, sodium chloride flush, sodium chloride, acetaminophen, ondansetron **OR** ondansetron, glucose, dextrose, glucagon (rDNA), dextrose    PHYSICAL EXAM     BP (!) 135/58   Pulse 89   Temp 98.6 °F (37 °C) (Oral)   Resp 24   Ht 5' 4\" (1.626 m)   Wt 158 lb (71.7 kg)   SpO2 92%   BMI 27.12 kg/m²   Temp  Av.9 °F (37.2 °C)  Min: 98.6 °F (37 °C)  Max: 99.2 °F (37.3 °C)  Constitutional:  The patient is awake, alert,     Skin:    Warm and dry. No rashes were noted. HEENT:   Round and reactive pupils. AT/NC  Neck:    Supple to movements. Chest:   No use of accessory muscles to breathe. Symmetrical expansion. Cardiovascular:  S1 and S2 are rhythmic and regular. No murmurs appreciated. Abdomen:   Positive bowel sounds to auscultation. Benign to palpation. DISTENDED   Extremities:   No clubbing, no cyanosis, no edema. CNS    AAxO   Lines: pIV    DIAGNOSTIC RESULTS   Radiology:    Recent Labs     07/23/21  0411 07/24/21  0504   WBC 25.4* 21.1*   RBC 4.97 4.58   HGB 13.5 12.5   HCT 43.3 39.7   MCV 87.1 86.7   MCH 27.2 27.3   MCHC 31.2* 31.5*   RDW 15.0 15.0    174   MPV 11.6 11.6     Recent Labs     07/23/21  0411 07/24/21  0504    138   K 4.7 3.9   * 106   CO2 17* 23   BUN 17 13   CREATININE 0.7 0.6   GLUCOSE 158* 128*   PROT  --  5.8*   LABALBU  --  3.4*   CALCIUM 8.6 8.8   BILITOT  --  0.4   ALKPHOS  --  84   AST  --  13   ALT  --  14     No results found for: CRP  No results found for: SEDRATE  Recent Labs     07/24/21  0504   AST 13   ALT 14     Lab Results   Component Value Date    CHOL 82 03/01/2021    TRIG 222 03/01/2021    HDL 22 03/01/2021    LDLCALC 16 03/01/2021    LABVLDL 44 03/01/2021     Lab Results   Component Value Date/Time    VITD25 11 (L) 03/13/2015 07:59 AM       Microbiology:      FINAL IMPRESSION      ID WAS ASKED TO SEE FOR VACCINATION  S/P SPLENECTOMY  CONSTIPATION MEDS ORDERED   ON O2  Bibasilar airspace opacities.  Differential includes atelectasis, infection,   and/or layering pleural effusions. CAN D/C WHEN STABLE  CAN F/U     pneumococcal 13-valent conjugate (PREVNAR) injection 0.5 mL, Prior to discharge  meningococcal group B (BEXSERO) injection 0.5 mL, Prior to discharge  haemophilus B conj-meningoccal (PEDVAX HIB) injection 7.5 mcg, Prior to discharge    -PROCAL  -IS   HAS BREATHING RX  COVID SCREEN      · Monitor labs    Imaging and labs were reviewed per medical records.    The patient was educated about the diagnosis, prognosis, indications, risks and benefits of treatment. An opportunity to ask questions was given to the patient/FAMILY. Thank you for involving me in the care of Oneyda Thomas will continue to follow. Please do not hesitate to call for any questions or concerns.     Electronically signed by Rubi Dill MD on 7/24/2021 at 10:55 AM

## 2021-07-24 NOTE — PROGRESS NOTES
Internal Medicine Progress Note    [facility]    Davion Motin. Belinda David, CONNOROVLADIMIR Novak D.O., DASIA Nugent Asa, D.O. Ramírez Márquez, MSN, APRN, NP-C  Rosales Mccray. Jessica Pemebrton, MSN, APRN-CNP     Primary Care Physician: Yousif Wilhelm DO   Admitting Physician:  Lea Fowler MD  Admission date and time: 7/22/2021  6:48 AM    Room:  0331/0331-01  Admitting diagnosis: Splenomegaly [R16.1]    Patient Name: Kim Slater  MRN: [id number]    Date of Service: 7/24/2021     Subjective:  Gregg Drew is a 63 y.o. female who was seen and examined today,7/24/2021, at the bedside. Patient seem to be improved today but still complaining of shortness of breath. Less pain present. Chest x-ray did show bilateral opacity with atelectasis. Currently using incentive spirometry    No family present during my examination. Review of System:   Constitutional:   Denies fever or chills, weight loss or gain, fatigue or malaise. HEENT:   Denies ear pain, sore throat, sinus or eye problems. Cardiovascular:   Denies any chest pain, irregular heartbeats, or palpitations. Respiratory:   Still on oxygen therapy. Shortness of breath with exertion  Gastrointestinal:   Denies nausea, vomiting, diarrhea, or constipation. Minimal abdominal pain  Genitourinary:    Denies any urgency, frequency, hematuria. Voiding  without difficulty. Extremities:   Denies lower extremity swelling, edema or cyanosis. Neurology:    Denies any headache or focal neurological deficits, Denies generalized weakness or memory difficulty. Psch:   Denies being anxious or depressed. Musculoskeletal:    Denies  myalgias, joint complaints or back pain. Integumentary:   Denies any rashes, ulcers, or excoriations. Denies bruising. Hematologic/Lymphatic:  Denies bruising or bleeding. Physical Exam:  No intake/output data recorded.     Intake/Output Summary (Last 24 hours) at 7/24/2021 1503  Last data filed at 7/24/2021 1441  Gross per 24 hour   Intake 430 ml   Output 1100 ml   Net -670 ml   I/O last 3 completed shifts: In: 200 [P.O.:420; I.V.:10]  Out: 1100 [Urine:1100]  Patient Vitals for the past 96 hrs (Last 3 readings):   Weight   07/22/21 0708 158 lb (71.7 kg)     Vital Signs:   Blood pressure (!) 111/55, pulse 94, temperature 99.1 °F (37.3 °C), temperature source Oral, resp. rate 20, height 5' 4\" (1.626 m), weight 158 lb (71.7 kg), SpO2 92 %, not currently breastfeeding. General appearance:  Alert, responsive, oriented to person, place, and time. Well preserved, alert, no distress. Head:  Normocephalic. No masses, lesions or tenderness. Eyes:  PERRLA. EOMI. Sclera clear. Buccal mucosa moist.  ENT:  Ears normal. Mucosa normal.  Wearing oxygen  Neck:    Supple. Trachea midline. No thyromegaly. No JVD. No bruits. Heart:    Rhythm regular. Rate controlled. No murmurs. Lungs:    Diminished at the bases  Abdomen:   Minimal tenderness. Postsurgical findings  Extremities:    Peripheral pulses present. No peripheral edema. No ulcers. No cyanosis. No clubbing. Neurologic:    Alert x 3. No focal deficit. Cranial nerves grossly intact. No focal weakness. Psych:   Behavior is normal. Mood appears normal. Speech is not rapid and/or pressured. Musculoskeletal:   Spine ROM normal. Muscular strength intact. Gait not assessed. Integumentary:  No rashes  Skin normal color and texture.   Genitalia/Breast:  Deferred    Medication:  Scheduled Meds:   Arformoterol Tartrate  15 mcg Nebulization BID    And    budesonide  0.5 mg Nebulization BID    ipratropium-albuterol  1 ampule Inhalation 4x daily    sodium chloride flush  10 mL Intravenous 2 times per day    enoxaparin  40 mg Subcutaneous Daily    bisacodyl  5 mg Oral Daily    famotidine  20 mg Oral BID    ketorolac  30 mg Intravenous Q6H    insulin lispro  0-6 Units Subcutaneous TID WC    insulin lispro  0-3 Units Subcutaneous Nightly    pneumococcal 13-valent conjugate  0.5 mL Intramuscular Prior to discharge    meningococcal group B  0.5 mL Intramuscular Prior to discharge    haemophilus B conj-meningoccal  0.5 mL Intramuscular Prior to discharge     Continuous Infusions:   sodium chloride      dextrose         Objective Data:  CBC with Differential:    Lab Results   Component Value Date    WBC 21.1 07/24/2021    RBC 4.58 07/24/2021    HGB 12.5 07/24/2021    HCT 39.7 07/24/2021     07/24/2021    MCV 86.7 07/24/2021    MCH 27.3 07/24/2021    MCHC 31.5 07/24/2021    RDW 15.0 07/24/2021    SEGSPCT 61 09/12/2013    LYMPHOPCT 4.7 07/24/2021    MONOPCT 11.8 07/24/2021    BASOPCT 0.4 07/24/2021    MONOSABS 2.50 07/24/2021    LYMPHSABS 1.00 07/24/2021    EOSABS 0.10 07/24/2021    BASOSABS 0.08 07/24/2021     CMP:    Lab Results   Component Value Date     07/24/2021    K 3.9 07/24/2021    K 4.7 07/23/2021     07/24/2021    CO2 23 07/24/2021    BUN 13 07/24/2021    CREATININE 0.6 07/24/2021    GFRAA >60 07/24/2021    LABGLOM >60 07/24/2021    GLUCOSE 128 07/24/2021    GLUCOSE 316 04/18/2011    PROT 5.8 07/24/2021    LABALBU 3.4 07/24/2021    LABALBU 4.2 04/18/2011    CALCIUM 8.8 07/24/2021    BILITOT 0.4 07/24/2021    ALKPHOS 84 07/24/2021    AST 13 07/24/2021    ALT 14 07/24/2021     Magnesium:  No results found for: MG  Phosphorus:  No results found for: PHOS           Assessment:    · 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 Use incentive spirometry.   Wean oxygen  · Continue to follow blood sugars      More than 50% of my time was spent at the bedside counseling/coordinating care with the patient and/or family with face to face contact. This time was spent reviewing notes and laboratory data as well as instructing and counseling the patient. Time I spent with the family or surrogate(s) is included only if the patient was incapable of providing the necessary information or participating in medical decisions. I also discussed the differential diagnosis and all of the proposed management plans with the patient and individuals accompanying the patient. I am readily available for any further decision-making and intervention.        Linda West DO, F.A.C.O.I.  7/24/2021  3:03 PM

## 2021-07-25 VITALS
RESPIRATION RATE: 16 BRPM | HEIGHT: 64 IN | WEIGHT: 158 LBS | BODY MASS INDEX: 26.98 KG/M2 | TEMPERATURE: 98.4 F | SYSTOLIC BLOOD PRESSURE: 100 MMHG | HEART RATE: 87 BPM | OXYGEN SATURATION: 90 % | DIASTOLIC BLOOD PRESSURE: 56 MMHG

## 2021-07-25 LAB
ALBUMIN SERPL-MCNC: 3.4 G/DL (ref 3.5–5.2)
ALP BLD-CCNC: 103 U/L (ref 35–104)
ALT SERPL-CCNC: 25 U/L (ref 0–32)
ANION GAP SERPL CALCULATED.3IONS-SCNC: 12 MMOL/L (ref 7–16)
AST SERPL-CCNC: 17 U/L (ref 0–31)
BASOPHILS ABSOLUTE: 0.31 E9/L (ref 0–0.2)
BASOPHILS RELATIVE PERCENT: 1.7 % (ref 0–2)
BILIRUB SERPL-MCNC: 0.7 MG/DL (ref 0–1.2)
BUN BLDV-MCNC: 14 MG/DL (ref 6–20)
CALCIUM SERPL-MCNC: 8.9 MG/DL (ref 8.6–10.2)
CHLORIDE BLD-SCNC: 107 MMOL/L (ref 98–107)
CO2: 22 MMOL/L (ref 22–29)
CREAT SERPL-MCNC: 0.6 MG/DL (ref 0.5–1)
EOSINOPHILS ABSOLUTE: 0.48 E9/L (ref 0.05–0.5)
EOSINOPHILS RELATIVE PERCENT: 2.6 % (ref 0–6)
GFR AFRICAN AMERICAN: >60
GFR NON-AFRICAN AMERICAN: >60 ML/MIN/1.73
GLUCOSE BLD-MCNC: 121 MG/DL (ref 74–99)
HCT VFR BLD CALC: 39.1 % (ref 34–48)
HEMOGLOBIN: 12.4 G/DL (ref 11.5–15.5)
LYMPHOCYTES ABSOLUTE: 0.37 E9/L (ref 1.5–4)
LYMPHOCYTES RELATIVE PERCENT: 1.7 % (ref 20–42)
MCH RBC QN AUTO: 27.4 PG (ref 26–35)
MCHC RBC AUTO-ENTMCNC: 31.7 % (ref 32–34.5)
MCV RBC AUTO: 86.3 FL (ref 80–99.9)
METER GLUCOSE: 134 MG/DL (ref 74–99)
METER GLUCOSE: 161 MG/DL (ref 74–99)
MONOCYTES ABSOLUTE: 1.1 E9/L (ref 0.1–0.95)
MONOCYTES RELATIVE PERCENT: 6.1 % (ref 2–12)
NEUTROPHILS ABSOLUTE: 16.1 E9/L (ref 1.8–7.3)
NEUTROPHILS RELATIVE PERCENT: 87.8 % (ref 43–80)
NUCLEATED RED BLOOD CELLS: 0.9 /100 WBC
PDW BLD-RTO: 15 FL (ref 11.5–15)
PLATELET # BLD: 228 E9/L (ref 130–450)
PMV BLD AUTO: 11.1 FL (ref 7–12)
POTASSIUM SERPL-SCNC: 3.4 MMOL/L (ref 3.5–5)
RBC # BLD: 4.53 E12/L (ref 3.5–5.5)
SODIUM BLD-SCNC: 141 MMOL/L (ref 132–146)
TOTAL PROTEIN: 6.1 G/DL (ref 6.4–8.3)
WBC # BLD: 18.3 E9/L (ref 4.5–11.5)

## 2021-07-25 PROCEDURE — 90472 IMMUNIZATION ADMIN EACH ADD: CPT | Performed by: SPECIALIST

## 2021-07-25 PROCEDURE — 94640 AIRWAY INHALATION TREATMENT: CPT

## 2021-07-25 PROCEDURE — 6360000002 HC RX W HCPCS: Performed by: SURGERY

## 2021-07-25 PROCEDURE — 80053 COMPREHEN METABOLIC PANEL: CPT

## 2021-07-25 PROCEDURE — 6370000000 HC RX 637 (ALT 250 FOR IP): Performed by: SURGERY

## 2021-07-25 PROCEDURE — 2580000003 HC RX 258: Performed by: SURGERY

## 2021-07-25 PROCEDURE — 6360000002 HC RX W HCPCS: Performed by: SPECIALIST

## 2021-07-25 PROCEDURE — 90620 MENB-4C VACCINE IM: CPT | Performed by: SPECIALIST

## 2021-07-25 PROCEDURE — 6370000000 HC RX 637 (ALT 250 FOR IP): Performed by: INTERNAL MEDICINE

## 2021-07-25 PROCEDURE — 90471 IMMUNIZATION ADMIN: CPT | Performed by: SPECIALIST

## 2021-07-25 PROCEDURE — 85025 COMPLETE CBC W/AUTO DIFF WBC: CPT

## 2021-07-25 PROCEDURE — 90670 PCV13 VACCINE IM: CPT | Performed by: SPECIALIST

## 2021-07-25 PROCEDURE — 82962 GLUCOSE BLOOD TEST: CPT

## 2021-07-25 PROCEDURE — 2700000000 HC OXYGEN THERAPY PER DAY

## 2021-07-25 PROCEDURE — 90647 HIB PRP-OMP VACC 3 DOSE IM: CPT | Performed by: SPECIALIST

## 2021-07-25 PROCEDURE — 6360000002 HC RX W HCPCS: Performed by: INTERNAL MEDICINE

## 2021-07-25 PROCEDURE — G0009 ADMIN PNEUMOCOCCAL VACCINE: HCPCS | Performed by: SPECIALIST

## 2021-07-25 PROCEDURE — 36415 COLL VENOUS BLD VENIPUNCTURE: CPT

## 2021-07-25 RX ORDER — GUAIFENESIN 600 MG/1
600 TABLET, EXTENDED RELEASE ORAL 2 TIMES DAILY
Status: DISCONTINUED | OUTPATIENT
Start: 2021-07-25 | End: 2021-07-25 | Stop reason: HOSPADM

## 2021-07-25 RX ADMIN — IPRATROPIUM BROMIDE AND ALBUTEROL SULFATE 1 AMPULE: .5; 2.5 SOLUTION RESPIRATORY (INHALATION) at 14:25

## 2021-07-25 RX ADMIN — GUAIFENESIN 600 MG: 600 TABLET, EXTENDED RELEASE ORAL at 12:43

## 2021-07-25 RX ADMIN — NEISSERIA MENINGITIDIS SEROGROUP B NHBA FUSION PROTEIN ANTIGEN, NEISSERIA MENINGITIDIS SEROGROUP B FHBP FUSION PROTEIN ANTIGEN AND NEISSERIA MENINGITIDIS SEROGROUP B NADA PROTEIN ANTIGEN 0.5 ML: 50; 50; 50; 25 INJECTION, SUSPENSION INTRAMUSCULAR at 16:05

## 2021-07-25 RX ADMIN — ARFORMOTEROL TARTRATE 15 MCG: 15 SOLUTION RESPIRATORY (INHALATION) at 06:39

## 2021-07-25 RX ADMIN — HAEMOPHILUS B CONJUGATE VACCINE (MENINGOCOCCAL PROTEIN CONJUGATE) 7.5 MCG: 7.5 INJECTION, SUSPENSION INTRAMUSCULAR at 16:10

## 2021-07-25 RX ADMIN — PNEUMOCOCCAL 13-VALENT CONJUGATE VACCINE 0.5 ML: 2.2; 2.2; 2.2; 2.2; 2.2; 4.4; 2.2; 2.2; 2.2; 2.2; 2.2; 2.2; 2.2 INJECTION, SUSPENSION INTRAMUSCULAR at 16:01

## 2021-07-25 RX ADMIN — FAMOTIDINE 20 MG: 20 TABLET ORAL at 08:47

## 2021-07-25 RX ADMIN — KETOROLAC TROMETHAMINE 30 MG: 30 INJECTION, SOLUTION INTRAMUSCULAR; INTRAVENOUS at 12:43

## 2021-07-25 RX ADMIN — KETOROLAC TROMETHAMINE 30 MG: 30 INJECTION, SOLUTION INTRAMUSCULAR; INTRAVENOUS at 05:43

## 2021-07-25 RX ADMIN — IPRATROPIUM BROMIDE AND ALBUTEROL SULFATE 1 AMPULE: .5; 2.5 SOLUTION RESPIRATORY (INHALATION) at 10:19

## 2021-07-25 RX ADMIN — BUDESONIDE 500 MCG: 0.5 INHALANT RESPIRATORY (INHALATION) at 06:39

## 2021-07-25 RX ADMIN — ENOXAPARIN SODIUM 40 MG: 40 INJECTION SUBCUTANEOUS at 08:47

## 2021-07-25 RX ADMIN — SODIUM CHLORIDE, PRESERVATIVE FREE 10 ML: 5 INJECTION INTRAVENOUS at 08:47

## 2021-07-25 RX ADMIN — BISACODYL 5 MG: 5 TABLET, COATED ORAL at 08:47

## 2021-07-25 RX ADMIN — IPRATROPIUM BROMIDE AND ALBUTEROL SULFATE 1 AMPULE: .5; 2.5 SOLUTION RESPIRATORY (INHALATION) at 06:39

## 2021-07-25 ASSESSMENT — PAIN SCALES - GENERAL
PAINLEVEL_OUTOF10: 2
PAINLEVEL_OUTOF10: 0
PAINLEVEL_OUTOF10: 0

## 2021-07-25 ASSESSMENT — PAIN DESCRIPTION - PROGRESSION: CLINICAL_PROGRESSION: RAPIDLY IMPROVING

## 2021-07-25 NOTE — PROGRESS NOTES
[facility]  [facility]  Progress Note    NAME: Matthieu Licona  MR:  [id number]  :   1960  DATE OF SERVICE:21    This is a face to face encounter with Matthieu Licona 63 y.o. female on 21    CHIEF COMPLAINT     ID following for No chief complaint on file. HISTORY OF PRESENT ILLNESS   Pt seen and examined  21   Family present on ra o2 sat low 90%  Some abd pain   Some cough     2021  C/O CONSTIPATION   NO F/C  ON O2 WAS SOB THIS AM     Patient is tolerating medications. No reported adverse drug reactions. REVIEW OF SYSTEMS     As stated above in the chief complaint, otherwise negative.   CURRENT MEDICATIONS      guaiFENesin  600 mg Oral BID    Arformoterol Tartrate  15 mcg Nebulization BID    And    budesonide  0.5 mg Nebulization BID    ipratropium-albuterol  1 ampule Inhalation 4x daily    sodium chloride flush  10 mL Intravenous 2 times per day    enoxaparin  40 mg Subcutaneous Daily    bisacodyl  5 mg Oral Daily    famotidine  20 mg Oral BID    ketorolac  30 mg Intravenous Q6H    insulin lispro  0-6 Units Subcutaneous TID WC    insulin lispro  0-3 Units Subcutaneous Nightly    pneumococcal 13-valent conjugate  0.5 mL Intramuscular Prior to discharge    meningococcal group B  0.5 mL Intramuscular Prior to discharge    haemophilus B conj-meningoccal  0.5 mL Intramuscular Prior to discharge     Continuous Infusions:   sodium chloride      dextrose       PRN Meds:oxyCODONE **OR** oxyCODONE, morphine, sodium chloride flush, sodium chloride, acetaminophen, ondansetron **OR** ondansetron, glucose, dextrose, glucagon (rDNA), dextrose    PHYSICAL EXAM     BP (!) 100/56   Pulse 87   Temp 98.4 °F (36.9 °C) (Oral)   Resp 16   Ht 5' 4\" (1.626 m)   Wt 158 lb (71.7 kg)   SpO2 90%   BMI 27.12 kg/m²   Temp  Av.6 °F (37 °C)  Min: 98.4 °F (36.9 °C)  Max: 98.9 °F (37.2 °C)  Constitutional:  The patient is awake, alert,  nad   Skin:    Warm and dry. No rashes were noted. HEENT:   Round and reactive pupils. AT/NC  Neck:    Supple to movements. Chest:   No use of accessory muscles to breathe. Symmetrical expansion. Dec bs post   Cardiovascular:  S1 and S2 are rhythmic and regular. No murmurs appreciated. Abdomen:   Positive bowel sounds to auscultation. Benign to palpation. DISTENDED slight tender  Extremities:   No clubbing, no cyanosis, no edema. CNS    AAxO   Lines: pIV    DIAGNOSTIC RESULTS   Radiology:    Recent Labs     07/23/21 0411 07/24/21 0504 07/25/21 0527   WBC 25.4* 21.1* 18.3*   RBC 4.97 4.58 4.53   HGB 13.5 12.5 12.4   HCT 43.3 39.7 39.1   MCV 87.1 86.7 86.3   MCH 27.2 27.3 27.4   MCHC 31.2* 31.5* 31.7*   RDW 15.0 15.0 15.0    174 228   MPV 11.6 11.6 11.1     Recent Labs     07/23/21 0411 07/24/21 0504 07/25/21 0527    138 141   K 4.7 3.9 3.4*   * 106 107   CO2 17* 23 22   BUN 17 13 14   CREATININE 0.7 0.6 0.6   GLUCOSE 158* 128* 121*   PROT  --  5.8* 6.1*   LABALBU  --  3.4* 3.4*   CALCIUM 8.6 8.8 8.9   BILITOT  --  0.4 0.7   ALKPHOS  --  84 103   AST  --  13 17   ALT  --  14 25     No results found for: CRP  No results found for: SEDRATE  Recent Labs     07/24/21 0504 07/25/21 0527   PROCAL 0.51*  --    AST 13 17   ALT 14 25     Lab Results   Component Value Date    CHOL 82 03/01/2021    TRIG 222 03/01/2021    HDL 22 03/01/2021    LDLCALC 16 03/01/2021    LABVLDL 44 03/01/2021     Lab Results   Component Value Date/Time    VITD25 11 (L) 03/13/2015 07:59 AM       Microbiology:      FINAL IMPRESSION      ID WAS ASKED TO SEE FOR VACCINATION  S/P SPLENECTOMY  Atelectasis cont IS oob/inc activity   CONSTIPATION MEDS ORDERED   ON O2  Bibasilar airspace opacities.  Differential includes atelectasis, infection,   and/or layering pleural effusions.    CAN D/C WHEN STABLE  CAN F/U     pneumococcal 13-valent conjugate (PREVNAR) injection 0.5 mL, Prior to discharge  meningococcal group B (BEXSERO) injection 0.5 mL, Prior to discharge  haemophilus B conj-meningoccal (PEDVAX HIB) injection 7.5 mcg, Prior to discharge    -PROCAL  -IS   HAS BREATHING RX  COVID SCREEN pending             · Monitor labs    Imaging and labs were reviewed per medical records. The patient was educated about the diagnosis, prognosis, indications, risks and benefits of treatment. An opportunity to ask questions was given to the patient/FAMILY. Thank you for involving me in the care of Billy Prost will continue to follow. Please do not hesitate to call for any questions or concerns.     Electronically signed by Alfa Farnsworth MD on 7/25/2021 at 4:01 PM

## 2021-07-25 NOTE — PLAN OF CARE
Problem: Falls - Risk of:  Goal: Will remain free from falls  7/25/2021 1638 by Burgess Tito RN  Outcome: Met This Shift     Problem: Falls - Risk of:  Goal: Absence of physical injury  7/25/2021 1638 by Burgess Tito RN  Outcome: Met This Shift     Problem: Pain:  Goal: Pain level will decrease  7/25/2021 1638 by Burgess Tito RN  Outcome: Met This Shift     Problem: Pain:  Goal: Control of acute pain  7/25/2021 1638 by Burgess Tito RN  Outcome: Met This Shift     Problem: Pain:  Goal: Control of chronic pain  7/25/2021 1638 by Burgess Tito RN  Outcome: Met This Shift

## 2021-07-25 NOTE — PLAN OF CARE
Problem: Falls - Risk of:  Goal: Will remain free from falls  Description: Will remain free from falls  Outcome: Met This Shift  Goal: Absence of physical injury  Description: Absence of physical injury  Outcome: Met This Shift     Problem: Pain:  Goal: Pain level will decrease  Description: Pain level will decrease  Outcome: Met This Shift  Goal: Control of acute pain  Description: Control of acute pain  Outcome: Met This Shift  Goal: Control of chronic pain  Description: Control of chronic pain  Outcome: Met This Shift     Problem: Falls - Risk of:  Goal: Will remain free from falls  Description: Will remain free from falls  Outcome: Met This Shift  Goal: Absence of physical injury  Description: Absence of physical injury  Outcome: Met This Shift     Problem: Pain:  Goal: Pain level will decrease  Description: Pain level will decrease  Outcome: Met This Shift  Goal: Control of acute pain  Description: Control of acute pain  Outcome: Met This Shift  Goal: Control of chronic pain  Description: Control of chronic pain  Outcome: Met This Shift   Continue current treatments

## 2021-07-25 NOTE — PROGRESS NOTES
[facility] [address] Day # 3  7/25/2021       Dangelo Sahu is a 63 y.o. female post splenectomy by Dr. Sara Neal 7/22/21. Awake and alert, minimal pain, breathing fine O2 sat is 95% on nasal oxygen, 90% on room air, heart rate 85. Ambulating to bathroom, wants to go home. Labs:   Recent Labs     07/24/21  0504 07/25/21  0527   WBC 21.1* 18.3*   HGB 12.5 12.4    228    141    107   K 3.9 3.4*   BUN 13 14   CREATININE 0.6 0.6   GLUCOSE 128* 121*   LABALBU 3.4* 3.4*   PROT 5.8* 6.1*   CALCIUM 8.8 8.9   BILITOT 0.4 0.7   ALKPHOS 84 103   AST 13 17   ALT 14 25     Physical exam:   VITALS: Blood pressure (!) 100/56, pulse 87, temperature 98.4 °F (36.9 °C), temperature source Oral, resp. rate 16, height 5' 4\" (1.626 m), weight 158 lb (71.7 kg), SpO2 94 %, not currently breastfeeding. General appearance: alert, appears stated age and cooperative, does not ambulate easily  Head: Normocephalic, without obvious abnormality, atraumatic  Eyes: PERRL  Ears/mouth/throat:  Ears clear, mouth normal, throat no redness  Neck: no adenopathy, no JVD, supple, symmetrical, trachea midline and thyroid not enlarged  Chest: Breath sounds were clear and equal with no rales, wheezes, or rhonchi. Respiratory effort was normal with no retractions or use of accessory muscles. Cardiovascular: Heart sounds were normal with a regular rate and rhythm. There were no murmurs or gallops. Abdomen: Bowel sounds hypoactive, Incisions clean and dry, glue in place. Mild incisional tenderness. Mild bruising. Extremeties: no edema, no calf pain    Assessment:    Hemodynamically stable post splenectomy, low oxygen saturations resolved, no more tachycardia. Plan:   Ok to discharge home.     Physician Signature: Electronically signed by Dr. Asad Martinez MD  7/25/2021

## 2021-07-26 LAB
SARS-COV-2: NOT DETECTED
SOURCE: NORMAL

## 2021-07-26 NOTE — DISCHARGE SUMMARY
[address]                               DISCHARGE SUMMARY    PATIENT NAME: aSima Okeefe                    :        1960  MED REC NO:   [id number]                            ROOM:       3030  ACCOUNT NO:   [de-identified]                           ADMIT DATE: 2021  PROVIDER:     Nidia Lombardi DO                  DISCHARGE DATE:  2021    ADMITTING DIAGNOSIS:  Splenomegaly with associated thrombocytopenia. FINAL DISCHARGE DIAGNOSIS:  Splenomegaly with associated  thrombocytopenia. SECONDARY DISCHARGE DIAGNOSES:  Hyperlipidemia, non-insulin diabetes  mellitus type 2, complication with leukocytosis secondary to  splenectomy, arterial hypoxemia secondary to bilateral opacity and  atelectasis. OPERATION PERFORMED:  Laparoscopic splenectomy by Dr. Anatoliy Bhagat on the  . CONSULTATION OBTAINED:  Dr. Camille Bolivar, Dr. Alicia Salazar, and Dr. Brian Sexton. No OMT  was given. ADMITTING PHYSICIANS:  Dr. Anatoliy Bhagat. CHIEF COMPLAINT AND HISTORY OF CHIEF COMPLAINT:  This is a pleasant  82-year-old white female admitted to [address] [place] [address]. The patient  was transferred here for elective splenectomy on . The patient was  admitted due to postoperative abdominal pain, is inspected. The patient  does have a history of thrombocytopenia with enlarging spleen at which  time splenectomy was recommended. She was admitted under the service of  Dr. Anatoliy Bhagat. PAST MEDICAL HISTORY:  Positive for usual childhood diseases, history of  type 2 diabetes mellitus, and hyperlipidemia. PHYSICAL EXAMINATION:  VITAL SIGNS:  Show vital signs to be stable. O2 sat 98%. GENERAL APPEARANCE:  This is a 82-year-old white female, awake, alert,  responsive. HEENT:  Normal.  Postop pain was present. NECK:  Revealed adequate carotid upstrokes without bruits. Trachea  midline. Thyroid not palpable. LUNGS:  Clear.   Diminished at the base.  HEART:  Fairly regular. ABDOMEN:  Postsurgical changes with generalized tenderness. EXTREMITIES:  Without cyanosis, clubbing, or edema. While the patient was in the hospital, she had the following laboratory  studies performed:  White count at the time of discharge were 18,000,  hemoglobin and hematocrit were 12.4 and 38.2 respectively. BUN and  creatinine were normal.  Potassium 3.4. Procalcitonin was at 0.51. For  further lab, please refer to chart. HOSPITAL STAY:  The patient was admitted to the hospital to the general  surgical floor. The patient will be admitted for same day surgery. At  this time laparoscopic splenectomy was performed by Dr. Beverley Toro. Postoperatively consultation was obtained with Dr. Nicolas Roberson for  vaccination administration along with Dr. Gadiel Jackman and Dr. Kenneth Cline. We will  proceed with further workup. The patient did relatively well and pain  management will be carried out. Unfortunately, she did develop some  postoperative hypoxemia with secondary atelectasis by chest x-ray. This  clinically improved. Optimal care was given. The patient was felt  stable enough to be weaned off of oxygen and felt stable enough to be  discharged home on the 25th. In addition, the patient was clinically  improved. The patient will receive pneumococcal 13 prior to discharge. The patient also will receive meningococcal prior to discharge along  with Haemophilus B prior to discharge. The patient has been seen by Dr. Nicolas Roberson who will be arranging these. Otherwise, the patient will be  discharged home by private car. She will followup with Dr. Beverley Toro in  one to two weeks along with primary care doctor. The patient's vital  signs at the time of discharge was blood pressure of 100/56, pulse 87,  respirations 16, O2 sat 90% on room air. Height 5 feet, 4 inches,  weight 158 pounds.   The patient was discharged on Colace 100 b.i.d.,  Motrin 800 q.6 p.r.n., Zofran p.r.n., and Percocet 5/325 q.4 p.r.n. The  patient will continue home medication of Lipitor 40 daily, DiaBeta 5 mg  b.i.d., Linzess 145 daily, metformin 1000 b.i.d. along with Steglatro 15  mg daily and Trulicity 1.5 weekly. The patient was discharged home by  private car. The patient will require vaccine to be updated  periodically because of increased risk of postoperative splenic  infection. More than 40 minutes were spent on the day of discharge with more than  50% of the time spent face-to-face with the patient discussing the plan  of care and treatment at this time.         Nasir Law DO    D: 07/25/2021 16:08:46       T: 07/25/2021 23:14:44     [email or web address]  Job#: [id number]     Doc#: [id number]    CC:

## 2021-08-04 ENCOUNTER — OFFICE VISIT (OUTPATIENT)
Dept: SURGERY | Age: 61
End: 2021-08-04

## 2021-08-04 VITALS
WEIGHT: 158 LBS | HEIGHT: 64 IN | SYSTOLIC BLOOD PRESSURE: 97 MMHG | BODY MASS INDEX: 26.98 KG/M2 | DIASTOLIC BLOOD PRESSURE: 56 MMHG | OXYGEN SATURATION: 96 % | TEMPERATURE: 98.4 F | HEART RATE: 75 BPM

## 2021-08-04 DIAGNOSIS — R10.12 LUQ PAIN: ICD-10-CM

## 2021-08-04 DIAGNOSIS — D69.6 THROMBOCYTOPENIA (HCC): ICD-10-CM

## 2021-08-04 DIAGNOSIS — R16.1 SPLENOMEGALY: Primary | ICD-10-CM

## 2021-08-04 PROCEDURE — 99024 POSTOP FOLLOW-UP VISIT: CPT | Performed by: SURGERY

## 2021-08-04 NOTE — PROGRESS NOTES
General Surgery Office Note  Maddie Patricia MD, MS    Patient's Name/Date of Birth: Ben Whittington / 1960    Date: August 4, 2021     Surgeon: Olivia Easley MD    Chief Complaint: Doing well follow-up after splenectomy    Patient Active Problem List   Diagnosis    Thrombocytopenia ([address]    Splenomegaly    LUQ pain       Subjective: Doing well, denies shortness of breath or chest pain. States her abdominal pain significantly improving. She is been tolerating a diet. No nausea or vomiting. She is been moving her bowels well. She has minimal pain in her midline incision. Objective:  BP (!) 97/56 (Site: Right Upper Arm, Position: Sitting, Cuff Size: Medium Adult)   Pulse 75   Temp 98.4 °F (36.9 °C)   Ht 5' 4\" (1.626 m)   Wt 158 lb (71.7 kg)   SpO2 96%   BMI 27.12 kg/m²   Labs:        General appearance: AA, NAD  HEENT: NCAT, PERRLA, EOMI  Lungs: Clear, equal rise bilateral  Heart: Reg  Abdomen: soft, nondistended, nontender, incisions well healed, no signs of infection, no cellulitis, no hematoma  Skin: No lesions, incisions well healed  Psych: No distress, conversive, no hallucinations  : No ulcers or lesions  Rectal: No bleeding    A complete 10 system review was performed and are otherwise negative unless mentioned in the above HPI. Specific negatives are listed below but may not include all those reviewed.     General ROS: negative obtundation, AMS  ENT ROS: negative rhinorrhea, epistaxis  Allergy and Immunology ROS: negative itchy/watery eyes or nasal congestion  Hematological and Lymphatic ROS: negative spontaneous bleeding or bruising  Endocrine ROS: negative  lethargy, mood swings, palpitations or polydipsia/polyuria  Respiratory ROS: negative sputum changes, stridor, tachypnea or wheezing  Cardiovascular ROS: negative for - loss of consciousness, murmur or orthopnea  Gastrointestinal ROS: negative for - hematochezia or hematemesis  Genito-Urinary ROS: negative for -  genital discharge or hematuria  Musculoskeletal ROS: negative for - focal weakness, gangrene  Psych/Neuro ROS: negative for - visual or auditory hallucinations, suicidal ideation      Time spent reviewing past medical, surgical, social and family history, vitals, nursing assessment and images. Pathology: Diagnosis:   A.  Spleen, laparoscopic splenectomy: Splenomegaly associated with   sinusoidal congestion suggestive of congestive splenomegaly, see comment. B.  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      Assessment/Plan:  Em Burns is a 63 y.o. female 2-week status post splenectomy for thrombocytopenia and splenomegaly    Resume activity gradually   Follow up as needed  No heavy lifting more than 20lbs for 4 weeks total  Pathology reviewed and copy provided  She has received her first round of vaccinations and will follow up with infectious disease for her second      Physician Signature: Electronically signed by Dr. Zena England  [phone] (p)  8/4/2021  4:31 PM

## 2021-08-25 ENCOUNTER — HOSPITAL ENCOUNTER (OUTPATIENT)
Age: 61
Discharge: HOME | End: 2021-08-27
Payer: MEDICAID

## 2021-08-25 ENCOUNTER — HOSPITAL ENCOUNTER (OUTPATIENT)
Dept: GENERAL RADIOLOGY | Age: 61
Discharge: HOME | End: 2021-08-27
Payer: MEDICAID

## 2021-08-25 ENCOUNTER — HOSPITAL ENCOUNTER (OUTPATIENT)
Age: 61
Discharge: HOME | End: 2021-08-25
Payer: MEDICAID

## 2021-08-25 DIAGNOSIS — J40 BRONCHITIS: ICD-10-CM

## 2021-08-25 LAB
BASOPHILS ABSOLUTE: 0.22 E9/L (ref 0–0.2)
BASOPHILS RELATIVE PERCENT: 1.8 % (ref 0–2)
DIGOXIN LEVEL: <0.3 NG/ML (ref 0.8–2)
EOSINOPHILS ABSOLUTE: 0.68 E9/L (ref 0.05–0.5)
EOSINOPHILS RELATIVE PERCENT: 5.7 % (ref 0–6)
HCT VFR BLD CALC: 47.6 % (ref 34–48)
HEMOGLOBIN: 14.8 G/DL (ref 11.5–15.5)
IMMATURE GRANULOCYTES #: 0.05 E9/L
IMMATURE GRANULOCYTES %: 0.4 % (ref 0–5)
LYMPHOCYTES ABSOLUTE: 2.38 E9/L (ref 1.5–4)
LYMPHOCYTES RELATIVE PERCENT: 19.9 % (ref 20–42)
MCH RBC QN AUTO: 26.7 PG (ref 26–35)
MCHC RBC AUTO-ENTMCNC: 31.1 % (ref 32–34.5)
MCV RBC AUTO: 85.9 FL (ref 80–99.9)
MONOCYTES ABSOLUTE: 1.34 E9/L (ref 0.1–0.95)
MONOCYTES RELATIVE PERCENT: 11.2 % (ref 2–12)
NEUTROPHILS ABSOLUTE: 7.26 E9/L (ref 1.8–7.3)
NEUTROPHILS RELATIVE PERCENT: 61 % (ref 43–80)
PDW BLD-RTO: 15.5 FL (ref 11.5–15)
PLATELET # BLD: 398 E9/L (ref 130–450)
PMV BLD AUTO: 9.5 FL (ref 7–12)
RBC # BLD: 5.54 E12/L (ref 3.5–5.5)
WBC # BLD: 11.9 E9/L (ref 4.5–11.5)

## 2021-08-25 PROCEDURE — 71046 X-RAY EXAM CHEST 2 VIEWS: CPT

## 2021-08-25 PROCEDURE — 80162 ASSAY OF DIGOXIN TOTAL: CPT

## 2021-08-25 PROCEDURE — 36415 COLL VENOUS BLD VENIPUNCTURE: CPT

## 2021-08-25 PROCEDURE — 85025 COMPLETE CBC W/AUTO DIFF WBC: CPT

## 2021-11-23 ENCOUNTER — HOSPITAL ENCOUNTER (OUTPATIENT)
Dept: MAMMOGRAPHY | Age: 61
Discharge: HOME | End: 2021-11-25
Payer: MEDICAID

## 2021-11-23 VITALS — WEIGHT: 162 LBS | BODY MASS INDEX: 28.7 KG/M2 | HEIGHT: 63 IN

## 2021-11-23 DIAGNOSIS — Z12.31 ENCOUNTER FOR SCREENING MAMMOGRAM FOR MALIGNANT NEOPLASM OF BREAST: ICD-10-CM

## 2021-11-23 DIAGNOSIS — Z12.31 BREAST CANCER SCREENING BY MAMMOGRAM: ICD-10-CM

## 2021-11-23 PROCEDURE — 77063 BREAST TOMOSYNTHESIS BI: CPT

## 2021-12-26 ENCOUNTER — HOSPITAL ENCOUNTER (EMERGENCY)
Age: 61
Discharge: HOME | End: 2021-12-26
Payer: MEDICAID

## 2021-12-26 ENCOUNTER — APPOINTMENT (OUTPATIENT)
Dept: GENERAL RADIOLOGY | Age: 61
End: 2021-12-26
Payer: MEDICAID

## 2021-12-26 ENCOUNTER — APPOINTMENT (OUTPATIENT)
Dept: CT IMAGING | Age: 61
End: 2021-12-26
Payer: MEDICAID

## 2021-12-26 VITALS
OXYGEN SATURATION: 95 % | HEART RATE: 94 BPM | RESPIRATION RATE: 20 BRPM | SYSTOLIC BLOOD PRESSURE: 106 MMHG | TEMPERATURE: 96.9 F | DIASTOLIC BLOOD PRESSURE: 46 MMHG

## 2021-12-26 DIAGNOSIS — S92.021A CLOSED DISPLACED FRACTURE OF ANTERIOR PROCESS OF RIGHT CALCANEUS, INITIAL ENCOUNTER: Primary | ICD-10-CM

## 2021-12-26 PROCEDURE — 29515 APPLICATION SHORT LEG SPLINT: CPT

## 2021-12-26 PROCEDURE — 99283 EMERGENCY DEPT VISIT LOW MDM: CPT

## 2021-12-26 PROCEDURE — 71101 X-RAY EXAM UNILAT RIBS/CHEST: CPT

## 2021-12-26 PROCEDURE — 73630 X-RAY EXAM OF FOOT: CPT

## 2021-12-26 PROCEDURE — 73700 CT LOWER EXTREMITY W/O DYE: CPT

## 2021-12-26 RX ORDER — OXYCODONE HYDROCHLORIDE AND ACETAMINOPHEN 5; 325 MG/1; MG/1
1 TABLET ORAL EVERY 6 HOURS PRN
Qty: 20 TABLET | Refills: 0 | Status: SHIPPED | OUTPATIENT
Start: 2021-12-26 | End: 2021-12-31

## 2021-12-26 ASSESSMENT — PAIN DESCRIPTION - PAIN TYPE: TYPE: ACUTE PAIN

## 2021-12-26 ASSESSMENT — PAIN DESCRIPTION - LOCATION: LOCATION: ANKLE;RIB CAGE

## 2021-12-26 ASSESSMENT — PAIN SCALES - GENERAL: PAINLEVEL_OUTOF10: 8

## 2021-12-26 ASSESSMENT — PAIN DESCRIPTION - ORIENTATION: ORIENTATION: RIGHT

## 2021-12-26 NOTE — Clinical Note
Isac Thomas was seen and treated in our emergency department on 12/26/2021. She may return to work on 12/29/2021. If you have any questions or concerns, please don't hesitate to call.       Stanton Abel PA-C

## 2021-12-26 NOTE — ED PROVIDER NOTES
[facility]     Department of Emergency Medicine   ED  Encounter Note  Admit Date/RoomTime: 2021  3:16 PM  ED Room: [place] [address]    NAME: Patricia Baig  : 1960  MRN: [id number]     Chief Complaint:  Fall (mechanical fall, injured right ankle and right rib cage)    History of Present Illness       Patricia Baig is a 63 y.o. old female who presents to the emergency department by private vehicle, for a mechanical fall which occured 0530 this morning prior to arrival. She reportedly loading her fall when she missed the curb and fell forwards onto her right side while in [place] prior to incident with complaints of right foot and right sided chest pain. Since onset the symptoms have been remaining constant. Her pain is aggraveated by any movement or palpation and relieved by nothing. She denies any head injury, headache, loss of consciousness, confusion, dizziness, neck pain, abdominal pain, back pain, numbness, weakness, blurred vision, nausea, vomiting, fever, chills, wounds or rash. She takes no blood thinning agents. ROS   Pertinent positives and negatives are stated within HPI, all other systems reviewed and are negative. Past Medical History:  has a past medical history of Diabetes mellitus ([address] and Hyperlipidemia. Surgical History:  has a past surgical history that includes Tonsillectomy; Dilation and curettage of uterus; and colectomy (N/A, 2021). Social History:  reports that she has been smoking cigarettes. She has been smoking about 1.00 pack per day. She has never used smokeless tobacco. She reports current alcohol use. She reports that she does not use drugs. Family History: family history is not on file. Allergies: Patient has no known allergies.     Physical Exam   Oxygen Saturation Interpretation: Normal.        ED Triage Vitals [21 1124]   BP Temp Temp Source Pulse Resp SpO2 Height Weight   (!) 106/46 96.9 °F (36.1 °C) Temporal 94 20 95 % -- -- Physical Exam  Constitutional:  Alert, development consistent with age. HEENT:  NC/NT. Airway patent. Neck:  No midline or paravertebral tenderness. Normal ROM. Supple. Chest: Right-sided anterior and lateral chest wall tenderness to palpation. Symmetric rise and fall without flail. Anjana Fanning Respiratory:  Lungs Clear to auscultation and breath sounds equal.  CV:  Regular rate and rhythm, normal heart sounds, without pathological murmurs, ectopy, gallops, or rubs. GI:  Abdomen Soft, nontender, good bowel sounds. No firm or pulsatile mass. Pelvis:  Stable, nontender to palpation. Back:  No midline or paravertebral tenderness. No costovertebral tenderness. Extremities: Tenderness to palpation of the right lateral foot and heel. No ankle tenderness. Dorsalis pedis pulses normal bilaterally. Capillary refill less than 3 seconds. Motor function and sensation intact and equal bilaterally. .  Integument:  Normal turgor. Warm, dry, without visible rash, unless noted elsewhere. Lymphatic: no lymphadenopathy noted  Neurological:  Oriented x3, GCS 15. Motor functions intact. Lab / Imaging Results   (All laboratory and radiology results have been personally reviewed by myself)  Labs:  No results found for this visit on 12/26/21. Imaging: All Radiology results interpreted by Radiologist unless otherwise noted. CT FOOT RIGHT WO CONTRAST   Final Result   Minimally displaced, intra-articular fracture in the anterior process of the   calcaneus. RECOMMENDATIONS:   Unavailable         XR RIBS RIGHT INCLUDE CHEST (MIN 3 VIEWS)   Final Result   1. Irregularity in the anterior right calcaneus. Fracture cannot be   excluded. Consider CT for further evaluation. 2.  No acute osseous abnormality seen in the right ribs. 3.  No acute cardiopulmonary abnormality. XR FOOT RIGHT (MIN 3 VIEWS)   Final Result   1. Irregularity in the anterior right calcaneus. Fracture cannot be   excluded. Consider CT for further evaluation. 2.  No acute osseous abnormality seen in the right ribs. 3.  No acute cardiopulmonary abnormality. ED Course / Medical Decision Making   Medications - No data to display     Re-examination:  12/26/21     Time: 1345  Patients symptoms show no change. Results discussed. Will obtain CT foot. Time: 8953  Results discussed. Consult(s):   None    Procedure(s):  PROCEDURE NOTE  12/26/21       Time: 8131    SPLINT  APPLICATION  Risks, benefits and alternatives (for applicable procedures below) described. Performed By: Venus López PA-C. Indication:  fracture of calcaneus . Procedure:   A short  right leg  Sugar tong splint was applied by me. The patient tolerated the procedure well. PROCEDURE NOTE  12/26/21       Time: 9425    SPLINT  APPLICATION  Risks, benefits and alternatives (for applicable procedures below) described. Performed By: Venus López PA-C. Indication:  fracture of calcaneus . Procedure:   A short  Posterior leg  splint was applied by me. The patient tolerated the procedure well. MDM:   Patient presents to the emergency department for a fall with right foot and right sided chest wall pain. Radiographs are obtained and confirm the above findings. Calcaneal fracture confirmed with CT. Patient placed in a sugar tong and posterior short leg splint. Patient to follow-up with orthopedic surgery, Dr. Gerardo Moore. Patient understands that under no circumstances may she bear weight on the affected extremity. Percocet as needed for pain. Patient educated on all new prescriptions. CSM intact prior to discharge. Return for any new worsening symptoms. Plan of Care/Counseling:  Venus Lpóez PA-C reviewed today's visit with the patient in addition to providing specific details for the plan of care and counseling regarding the diagnosis and prognosis.   Questions are answered at this time and are agreeable with the

## 2022-02-08 ENCOUNTER — HOSPITAL ENCOUNTER (OUTPATIENT)
Dept: GENERAL RADIOLOGY | Age: 62
Discharge: HOME | End: 2022-02-10
Payer: MEDICAID

## 2022-02-08 ENCOUNTER — HOSPITAL ENCOUNTER (OUTPATIENT)
Age: 62
Discharge: HOME | End: 2022-02-10
Payer: MEDICAID

## 2022-02-08 DIAGNOSIS — M25.511 RIGHT SHOULDER PAIN, UNSPECIFIED CHRONICITY: ICD-10-CM

## 2022-02-08 PROCEDURE — 73030 X-RAY EXAM OF SHOULDER: CPT

## 2022-06-09 ENCOUNTER — HOSPITAL ENCOUNTER (OUTPATIENT)
Dept: GENERAL RADIOLOGY | Age: 62
Discharge: HOME | End: 2022-06-11
Payer: MEDICAID

## 2022-06-09 ENCOUNTER — HOSPITAL ENCOUNTER (OUTPATIENT)
Age: 62
Discharge: HOME | End: 2022-06-11
Payer: MEDICAID

## 2022-06-09 ENCOUNTER — HOSPITAL ENCOUNTER (OUTPATIENT)
Age: 62
Discharge: HOME | End: 2022-06-09
Payer: MEDICAID

## 2022-06-09 DIAGNOSIS — J44.9 CHRONIC OBSTRUCTIVE PULMONARY DISEASE, UNSPECIFIED COPD TYPE (HCC): ICD-10-CM

## 2022-06-09 DIAGNOSIS — R42 VERTIGO: ICD-10-CM

## 2022-06-09 LAB
ALBUMIN SERPL-MCNC: 4.4 G/DL (ref 3.5–5.2)
ALP BLD-CCNC: 103 U/L (ref 35–104)
ALT SERPL-CCNC: 17 U/L (ref 0–32)
ANION GAP SERPL CALCULATED.3IONS-SCNC: 9 MMOL/L (ref 7–16)
ANISOCYTOSIS: ABNORMAL
AST SERPL-CCNC: 15 U/L (ref 0–31)
BASOPHILS ABSOLUTE: 0.31 E9/L (ref 0–0.2)
BASOPHILS RELATIVE PERCENT: 1.7 % (ref 0–2)
BILIRUB SERPL-MCNC: 0.3 MG/DL (ref 0–1.2)
BILIRUBIN URINE: NEGATIVE
BLOOD, URINE: NEGATIVE
BUN BLDV-MCNC: 13 MG/DL (ref 6–23)
BURR CELLS: ABNORMAL
CALCIUM SERPL-MCNC: 9.3 MG/DL (ref 8.6–10.2)
CHLORIDE BLD-SCNC: 105 MMOL/L (ref 98–107)
CHOLESTEROL, TOTAL: 96 MG/DL (ref 0–199)
CLARITY: CLEAR
CO2: 25 MMOL/L (ref 22–29)
COLOR: YELLOW
CREAT SERPL-MCNC: 0.6 MG/DL (ref 0.5–1)
EOSINOPHILS ABSOLUTE: 0.78 E9/L (ref 0.05–0.5)
EOSINOPHILS RELATIVE PERCENT: 4.3 % (ref 0–6)
GFR AFRICAN AMERICAN: >60
GFR NON-AFRICAN AMERICAN: >60 ML/MIN/1.73
GLUCOSE BLD-MCNC: 118 MG/DL (ref 74–99)
GLUCOSE URINE: 500 MG/DL
HCT VFR BLD CALC: 50.6 % (ref 34–48)
HDLC SERPL-MCNC: 25 MG/DL
HEMOGLOBIN: 16.1 G/DL (ref 11.5–15.5)
HOWELL-JOLLY BODIES: ABNORMAL
KETONES, URINE: NEGATIVE MG/DL
LDL CHOLESTEROL CALCULATED: 28 MG/DL (ref 0–99)
LEUKOCYTE ESTERASE, URINE: NEGATIVE
LYMPHOCYTES ABSOLUTE: 2.73 E9/L (ref 1.5–4)
LYMPHOCYTES RELATIVE PERCENT: 14.8 % (ref 20–42)
MCH RBC QN AUTO: 28.9 PG (ref 26–35)
MCHC RBC AUTO-ENTMCNC: 31.8 % (ref 32–34.5)
MCV RBC AUTO: 90.7 FL (ref 80–99.9)
MONOCYTES ABSOLUTE: 1.27 E9/L (ref 0.1–0.95)
MONOCYTES RELATIVE PERCENT: 7 % (ref 2–12)
NEUTROPHILS ABSOLUTE: 13.1 E9/L (ref 1.8–7.3)
NEUTROPHILS RELATIVE PERCENT: 72.2 % (ref 43–80)
NITRITE, URINE: NEGATIVE
PDW BLD-RTO: 15.8 FL (ref 11.5–15)
PH UA: 5 (ref 5–9)
PLATELET # BLD: 384 E9/L (ref 130–450)
PMV BLD AUTO: 9.7 FL (ref 7–12)
POIKILOCYTES: ABNORMAL
POLYCHROMASIA: ABNORMAL
POTASSIUM SERPL-SCNC: 4.4 MMOL/L (ref 3.5–5)
PROTEIN UA: NEGATIVE MG/DL
RBC # BLD: 5.58 E12/L (ref 3.5–5.5)
SODIUM BLD-SCNC: 139 MMOL/L (ref 132–146)
SPECIFIC GRAVITY UA: >=1.03 (ref 1–1.03)
T4 TOTAL: 6.4 MCG/DL (ref 4.5–11.7)
TOTAL PROTEIN: 7 G/DL (ref 6.4–8.3)
TRIGL SERPL-MCNC: 213 MG/DL (ref 0–149)
TSH SERPL DL<=0.05 MIU/L-ACNC: 1.36 UIU/ML (ref 0.27–4.2)
UROBILINOGEN, URINE: 0.2 E.U./DL
VLDLC SERPL CALC-MCNC: 43 MG/DL
WBC # BLD: 18.2 E9/L (ref 4.5–11.5)

## 2022-06-09 PROCEDURE — 85025 COMPLETE CBC W/AUTO DIFF WBC: CPT

## 2022-06-09 PROCEDURE — 84436 ASSAY OF TOTAL THYROXINE: CPT

## 2022-06-09 PROCEDURE — 71046 X-RAY EXAM CHEST 2 VIEWS: CPT

## 2022-06-09 PROCEDURE — 70220 X-RAY EXAM OF SINUSES: CPT

## 2022-06-09 PROCEDURE — 80053 COMPREHEN METABOLIC PANEL: CPT

## 2022-06-09 PROCEDURE — 36415 COLL VENOUS BLD VENIPUNCTURE: CPT

## 2022-06-09 PROCEDURE — 84443 ASSAY THYROID STIM HORMONE: CPT

## 2022-06-09 PROCEDURE — 81003 URINALYSIS AUTO W/O SCOPE: CPT

## 2022-06-09 PROCEDURE — 80061 LIPID PANEL: CPT

## 2022-06-15 ENCOUNTER — HOSPITAL ENCOUNTER (OUTPATIENT)
Age: 62
Discharge: HOME | End: 2022-06-15
Payer: MEDICAID

## 2022-06-15 LAB
BASOPHILS ABSOLUTE: 0.47 E9/L (ref 0–0.2)
BASOPHILS RELATIVE PERCENT: 2.6 % (ref 0–2)
BURR CELLS: ABNORMAL
EOSINOPHILS ABSOLUTE: 0.78 E9/L (ref 0.05–0.5)
EOSINOPHILS RELATIVE PERCENT: 4.3 % (ref 0–6)
HCT VFR BLD CALC: 50.7 % (ref 34–48)
HEMOGLOBIN: 16 G/DL (ref 11.5–15.5)
LYMPHOCYTES ABSOLUTE: 2.73 E9/L (ref 1.5–4)
LYMPHOCYTES RELATIVE PERCENT: 14.8 % (ref 20–42)
MCH RBC QN AUTO: 28.2 PG (ref 26–35)
MCHC RBC AUTO-ENTMCNC: 31.6 % (ref 32–34.5)
MCV RBC AUTO: 89.4 FL (ref 80–99.9)
MONOCYTES ABSOLUTE: 1.82 E9/L (ref 0.1–0.95)
MONOCYTES RELATIVE PERCENT: 9.6 % (ref 2–12)
NEUTROPHILS ABSOLUTE: 12.56 E9/L (ref 1.8–7.3)
NEUTROPHILS RELATIVE PERCENT: 68.7 % (ref 43–80)
PDW BLD-RTO: 15.8 FL (ref 11.5–15)
PLATELET # BLD: 393 E9/L (ref 130–450)
PMV BLD AUTO: 9.4 FL (ref 7–12)
POIKILOCYTES: ABNORMAL
RBC # BLD: 5.67 E12/L (ref 3.5–5.5)
WBC # BLD: 18.2 E9/L (ref 4.5–11.5)

## 2022-06-15 PROCEDURE — 85025 COMPLETE CBC W/AUTO DIFF WBC: CPT

## 2022-06-15 PROCEDURE — 36415 COLL VENOUS BLD VENIPUNCTURE: CPT

## 2022-06-23 ENCOUNTER — HOSPITAL ENCOUNTER (OUTPATIENT)
Dept: INTERVENTIONAL RADIOLOGY/VASCULAR | Age: 62
Discharge: HOME | End: 2022-06-25
Payer: MEDICAID

## 2022-06-23 DIAGNOSIS — R42 DIZZINESS: ICD-10-CM

## 2022-06-23 PROCEDURE — 93880 EXTRACRANIAL BILAT STUDY: CPT

## 2022-07-04 ENCOUNTER — APPOINTMENT (OUTPATIENT)
Dept: GENERAL RADIOLOGY | Age: 62
End: 2022-07-04
Payer: MEDICAID

## 2022-07-04 ENCOUNTER — HOSPITAL ENCOUNTER (EMERGENCY)
Age: 62
Discharge: HOME | End: 2022-07-04
Attending: EMERGENCY MEDICINE
Payer: MEDICAID

## 2022-07-04 VITALS
DIASTOLIC BLOOD PRESSURE: 84 MMHG | HEART RATE: 95 BPM | TEMPERATURE: 97.7 F | OXYGEN SATURATION: 93 % | SYSTOLIC BLOOD PRESSURE: 130 MMHG | WEIGHT: 163 LBS | RESPIRATION RATE: 16 BRPM | BODY MASS INDEX: 28.87 KG/M2

## 2022-07-04 DIAGNOSIS — G89.29 CHRONIC RIGHT SHOULDER PAIN: Primary | ICD-10-CM

## 2022-07-04 DIAGNOSIS — M25.511 CHRONIC RIGHT SHOULDER PAIN: Primary | ICD-10-CM

## 2022-07-04 PROCEDURE — 99284 EMERGENCY DEPT VISIT MOD MDM: CPT

## 2022-07-04 PROCEDURE — 73030 X-RAY EXAM OF SHOULDER: CPT

## 2022-07-04 PROCEDURE — 6360000002 HC RX W HCPCS: Performed by: STUDENT IN AN ORGANIZED HEALTH CARE EDUCATION/TRAINING PROGRAM

## 2022-07-04 PROCEDURE — 96372 THER/PROPH/DIAG INJ SC/IM: CPT

## 2022-07-04 RX ORDER — KETOROLAC TROMETHAMINE 30 MG/ML
30 INJECTION, SOLUTION INTRAMUSCULAR; INTRAVENOUS ONCE
Status: COMPLETED | OUTPATIENT
Start: 2022-07-04 | End: 2022-07-04

## 2022-07-04 RX ADMIN — KETOROLAC TROMETHAMINE 30 MG: 30 INJECTION, SOLUTION INTRAMUSCULAR; INTRAVENOUS at 08:23

## 2022-07-04 ASSESSMENT — ENCOUNTER SYMPTOMS
COUGH: 0
SHORTNESS OF BREATH: 0
NAUSEA: 0
SORE THROAT: 0
PHOTOPHOBIA: 0
ABDOMINAL PAIN: 0

## 2022-07-04 ASSESSMENT — PAIN DESCRIPTION - LOCATION: LOCATION: SHOULDER

## 2022-07-04 ASSESSMENT — PAIN DESCRIPTION - ORIENTATION: ORIENTATION: RIGHT

## 2022-07-04 ASSESSMENT — PAIN SCALES - GENERAL: PAINLEVEL_OUTOF10: 7

## 2022-07-04 NOTE — ED PROVIDER NOTES
82-year-old female presenting for shoulder pain since last December. Symptoms have been constant and mild in severity. Patient states that she fell last December onto her arm. X-rays at that time were negative. She has seen orthopedic surgery and has had steroid shot and physical therapy, but states that she is still having pain. She comes in today as she is still having pain in her right shoulder. She denies any recent trauma. She denies numbness or tingling. Review of Systems   Constitutional: Negative for chills and fever. HENT: Negative for congestion and sore throat. Eyes: Negative for photophobia and visual disturbance. Respiratory: Negative for cough and shortness of breath. Cardiovascular: Negative for chest pain. Gastrointestinal: Negative for abdominal pain and nausea. Musculoskeletal: Positive for arthralgias. Skin: Negative for rash and wound. Neurological: Negative for dizziness and headaches. All other systems reviewed and are negative. Physical Exam  Constitutional:       General: She is not in acute distress. Appearance: She is not ill-appearing. HENT:      Head: Normocephalic and atraumatic. Right Ear: External ear normal.      Left Ear: External ear normal.      Nose: Nose normal.   Eyes:      Conjunctiva/sclera: Conjunctivae normal.   Cardiovascular:      Rate and Rhythm: Normal rate and regular rhythm. Comments: Right radial pulse strong  Pulmonary:      Effort: Pulmonary effort is normal.      Breath sounds: Normal breath sounds. Abdominal:      General: There is no distension. Musculoskeletal:         General: No swelling. Comments: Range of motion right shoulder limited due to pain; mild tenderness to palpation over greater and lesser tubercles right humerus   Skin:     General: Skin is warm and dry. Neurological:      General: No focal deficit present. Mental Status: She is alert.       Comments: Sensation intact all nerve distributions right upper extremity   Psychiatric:         Mood and Affect: Mood normal.         Behavior: Behavior normal.          Procedures     MDM  Number of Diagnoses or Management Options  Chronic right shoulder pain  Diagnosis management comments: 63-year-old female presenting for right shoulder pain after falling in December. Neurovascularly intact. No acute finding on x-ray. Patient was given Toradol with some relief. She was counseled on taking NSAIDs and using ice/heat for pain. She was instructed to follow-up with her orthopedic surgeon and discharged home in stable condition. ED Course as of 07/04/22 0829   Mon Jul 04, 2022   0820   ATTENDING PROVIDER ATTESTATION:     I have personally performed and/or participated in the history, exam, medical decision making, and procedures and agree with all pertinent clinical information unless otherwise noted. I have also reviewed and agree with the past medical, family and social history unless otherwise noted. I have discussed this patient in detail with the resident and provided the instruction and education regarding the evidence-based evaluation and treatment of R shoulder pain. History: Presents to the ED with chronic right shoulder pain. Patient states she had fallen back in December. She injured the right side of her body. A month later she developed right shoulder pain. She was seen by her PCP and then later referred to orthopedics. Orthopedics had ordered PT which did improve her symptoms. She was then released from orthopedic care. Recently the pain has come back and has been persistent. Denies any new falls or injuries. Denies any numbness or weakness in the right upper extremity. Pain is worse with movement. My findings: Carole Tena is a 63 y.o. female whom is in no distress. Physical exam reveals tenderness [facility] the [facility] joint. Mild crepitance with range of motion passively.   No bony crepitance noted in the right clavicle. Sensation grossly intact in right upper extremity. Right radial pulses 2+. My plan: Symptomatic and supportive care. Appropriate imaging. Electronically signed by Ayush Correa DO on 7/4/22 at 8:20 AM EDT       [MS]      ED Course User Index  [MS] Ayush Correa DO          ED Course as of 07/04/22 0829   Mon Jul 04, 2022   0820   ATTENDING PROVIDER ATTESTATION:     I have personally performed and/or participated in the history, exam, medical decision making, and procedures and agree with all pertinent clinical information unless otherwise noted. I have also reviewed and agree with the past medical, family and social history unless otherwise noted. I have discussed this patient in detail with the resident and provided the instruction and education regarding the evidence-based evaluation and treatment of R shoulder pain. History: Presents to the ED with chronic right shoulder pain. Patient states she had fallen back in December. She injured the right side of her body. A month later she developed right shoulder pain. She was seen by her PCP and then later referred to orthopedics. Orthopedics had ordered PT which did improve her symptoms. She was then released from orthopedic care. Recently the pain has come back and has been persistent. Denies any new falls or injuries. Denies any numbness or weakness in the right upper extremity. Pain is worse with movement. My findings: Leslie Mondragon is a 63 y.o. female whom is in no distress. Physical exam reveals tenderness near the [facility] joint. Mild crepitance with range of motion passively. No bony crepitance noted in the right clavicle. Sensation grossly intact in right upper extremity. Right radial pulses 2+. My plan: Symptomatic and supportive care. Appropriate imaging.     Electronically signed by Ayush Correa DO on 7/4/22 at 8:20 AM EDT       [MS]      ED Course User Index  [MS] Ayush Correa DO --------------------------------------------- PAST HISTORY ---------------------------------------------  Past Medical History:  has a past medical history of Diabetes mellitus ([facility] [address] and Hyperlipidemia. Past Surgical History:  has a past surgical history that includes Tonsillectomy; Dilation and curettage of uterus; and colectomy (N/A, 7/22/2021). Social History:  reports that she has been smoking cigarettes. She has been smoking about 1.00 pack per day. She has never used smokeless tobacco. She reports current alcohol use. She reports that she does not use drugs. Family History: family history is not on file. The patients home medications have been reviewed. Allergies: Patient has no known allergies. -------------------------------------------------- RESULTS -------------------------------------------------  Labs:  No results found for this visit on 07/04/22. Radiology:  XR SHOULDER RIGHT (MIN 2 VIEWS)   Final Result   Unremarkable right shoulder. ------------------------- NURSING NOTES AND VITALS REVIEWED ---------------------------  Date / Time Roomed:  7/4/2022  7:49 AM  ED Bed Assignment:  08/08    The nursing notes within the ED encounter and vital signs as below have been reviewed. /84   Pulse 95   Temp 97.7 °F (36.5 °C) (Oral)   Resp 16   Wt 163 lb (73.9 kg)   SpO2 93%   BMI 28.87 kg/m²   Oxygen Saturation Interpretation: Normal      ------------------------------------------ PROGRESS NOTES ------------------------------------------    I have spoken with the patient and discussed todays results, in addition to providing specific details for the plan of care and counseling regarding the diagnosis and prognosis. Their questions are answered at this time and they are agreeable with the plan. I discussed at length with them reasons for immediate return here for re evaluation.  They will followup with their orthopedic physician by calling their office tomorrow. --------------------------------- ADDITIONAL PROVIDER NOTES ---------------------------------  At this time the patient is without objective evidence of an acute process requiring hospitalization or inpatient management. They have remained hemodynamically stable throughout their entire ED visit and are stable for discharge with outpatient follow-up. The plan has been discussed in detail and they are aware of the specific conditions for emergent return, as well as the importance of follow-up. New Prescriptions    No medications on file       Diagnosis:  1. Chronic right shoulder pain        Disposition:  Patient's disposition: Discharge to home  Patient's condition is stable.           Romy Farias MD  Resident  07/04/22 4499

## 2022-07-04 NOTE — ED NOTES
Discharge instructions reviewed, patient verbalized understanding.       Chaz Alaniz RN  07/04/22 [id number]

## 2022-07-18 ENCOUNTER — HOSPITAL ENCOUNTER (OUTPATIENT)
Dept: CT IMAGING | Age: 62
Discharge: HOME | End: 2022-07-18
Payer: MEDICAID

## 2022-07-18 DIAGNOSIS — H81.10 BENIGN PAROXYSMAL VERTIGO, UNSPECIFIED LATERALITY: ICD-10-CM

## 2022-07-18 PROCEDURE — 70450 CT HEAD/BRAIN W/O DYE: CPT

## 2022-09-15 ENCOUNTER — HOSPITAL ENCOUNTER (OUTPATIENT)
Age: 62
Discharge: HOME | End: 2022-09-17
Payer: MEDICAID

## 2022-09-15 ENCOUNTER — HOSPITAL ENCOUNTER (OUTPATIENT)
Dept: GENERAL RADIOLOGY | Age: 62
Discharge: HOME | End: 2022-09-17
Payer: MEDICAID

## 2022-09-15 ENCOUNTER — HOSPITAL ENCOUNTER (OUTPATIENT)
Age: 62
Discharge: HOME | End: 2022-09-15
Payer: MEDICAID

## 2022-09-15 DIAGNOSIS — R07.9 CHEST PAIN, UNSPECIFIED TYPE: ICD-10-CM

## 2022-09-15 LAB
ALBUMIN SERPL-MCNC: 4.3 G/DL (ref 3.5–5.2)
ALP BLD-CCNC: 108 U/L (ref 35–104)
ALT SERPL-CCNC: 22 U/L (ref 0–32)
ANION GAP SERPL CALCULATED.3IONS-SCNC: 11 MMOL/L (ref 7–16)
AST SERPL-CCNC: 15 U/L (ref 0–31)
BASOPHILS ABSOLUTE: 0.3 E9/L (ref 0–0.2)
BASOPHILS RELATIVE PERCENT: 1.8 % (ref 0–2)
BILIRUB SERPL-MCNC: 0.3 MG/DL (ref 0–1.2)
BILIRUBIN URINE: NEGATIVE
BLOOD, URINE: NEGATIVE
BUN BLDV-MCNC: 15 MG/DL (ref 6–23)
CALCIUM SERPL-MCNC: 9.4 MG/DL (ref 8.6–10.2)
CHLORIDE BLD-SCNC: 105 MMOL/L (ref 98–107)
CHOLESTEROL, TOTAL: 106 MG/DL (ref 0–199)
CLARITY: CLEAR
CO2: 24 MMOL/L (ref 22–29)
COLOR: YELLOW
CREAT SERPL-MCNC: 0.6 MG/DL (ref 0.5–1)
EOSINOPHILS ABSOLUTE: 0.87 E9/L (ref 0.05–0.5)
EOSINOPHILS RELATIVE PERCENT: 5.3 % (ref 0–6)
GFR AFRICAN AMERICAN: >60
GFR NON-AFRICAN AMERICAN: >60 ML/MIN/1.73
GLUCOSE BLD-MCNC: 124 MG/DL (ref 74–99)
GLUCOSE URINE: >=1000 MG/DL
HBA1C MFR BLD: 7.6 % (ref 4–5.6)
HCT VFR BLD CALC: 51.4 % (ref 34–48)
HDLC SERPL-MCNC: 24 MG/DL
HEMOGLOBIN: 16.1 G/DL (ref 11.5–15.5)
KETONES, URINE: NEGATIVE MG/DL
LDL CHOLESTEROL CALCULATED: 22 MG/DL (ref 0–99)
LEUKOCYTE ESTERASE, URINE: NEGATIVE
LYMPHOCYTES ABSOLUTE: 3.3 E9/L (ref 1.5–4)
LYMPHOCYTES RELATIVE PERCENT: 20.2 % (ref 20–42)
MCH RBC QN AUTO: 28.3 PG (ref 26–35)
MCHC RBC AUTO-ENTMCNC: 31.3 % (ref 32–34.5)
MCV RBC AUTO: 90.5 FL (ref 80–99.9)
MONOCYTES ABSOLUTE: 1.48 E9/L (ref 0.1–0.95)
MONOCYTES RELATIVE PERCENT: 8.8 % (ref 2–12)
MYELOCYTE PERCENT: 0.9 % (ref 0–0)
NEUTROPHILS ABSOLUTE: 10.56 E9/L (ref 1.8–7.3)
NEUTROPHILS RELATIVE PERCENT: 63.2 % (ref 43–80)
NITRITE, URINE: NEGATIVE
PDW BLD-RTO: 15.8 FL (ref 11.5–15)
PH UA: 5 (ref 5–9)
PLATELET # BLD: 286 E9/L (ref 130–450)
PMV BLD AUTO: 9.9 FL (ref 7–12)
POTASSIUM SERPL-SCNC: 4.5 MMOL/L (ref 3.5–5)
PROTEIN UA: NEGATIVE MG/DL
RBC # BLD: 5.68 E12/L (ref 3.5–5.5)
SODIUM BLD-SCNC: 140 MMOL/L (ref 132–146)
SPECIFIC GRAVITY UA: 1.02 (ref 1–1.03)
T4 TOTAL: 6.3 MCG/DL (ref 4.5–11.7)
TOTAL PROTEIN: 7.1 G/DL (ref 6.4–8.3)
TRIGL SERPL-MCNC: 298 MG/DL (ref 0–149)
TSH SERPL DL<=0.05 MIU/L-ACNC: 1.39 UIU/ML (ref 0.27–4.2)
UROBILINOGEN, URINE: 0.2 E.U./DL
VLDLC SERPL CALC-MCNC: 60 MG/DL
WBC # BLD: 16.5 E9/L (ref 4.5–11.5)

## 2022-09-15 PROCEDURE — 80061 LIPID PANEL: CPT

## 2022-09-15 PROCEDURE — 87077 CULTURE AEROBIC IDENTIFY: CPT

## 2022-09-15 PROCEDURE — 71046 X-RAY EXAM CHEST 2 VIEWS: CPT

## 2022-09-15 PROCEDURE — 87186 SC STD MICRODIL/AGAR DIL: CPT

## 2022-09-15 PROCEDURE — 87088 URINE BACTERIA CULTURE: CPT

## 2022-09-15 PROCEDURE — 85025 COMPLETE CBC W/AUTO DIFF WBC: CPT

## 2022-09-15 PROCEDURE — 80053 COMPREHEN METABOLIC PANEL: CPT

## 2022-09-15 PROCEDURE — 84436 ASSAY OF TOTAL THYROXINE: CPT

## 2022-09-15 PROCEDURE — 83036 HEMOGLOBIN GLYCOSYLATED A1C: CPT

## 2022-09-15 PROCEDURE — 36415 COLL VENOUS BLD VENIPUNCTURE: CPT

## 2022-09-15 PROCEDURE — 84443 ASSAY THYROID STIM HORMONE: CPT

## 2022-09-15 PROCEDURE — 81003 URINALYSIS AUTO W/O SCOPE: CPT

## 2022-09-17 LAB
ORGANISM: ABNORMAL
URINE CULTURE, ROUTINE: ABNORMAL
URINE CULTURE, ROUTINE: ABNORMAL

## 2022-09-19 ENCOUNTER — HOSPITAL ENCOUNTER (OUTPATIENT)
Dept: CT IMAGING | Age: 62
Discharge: HOME | End: 2022-09-19
Payer: MEDICAID

## 2022-09-19 ENCOUNTER — HOSPITAL ENCOUNTER (OUTPATIENT)
Age: 62
Discharge: HOME | End: 2022-09-19
Payer: MEDICAID

## 2022-09-19 DIAGNOSIS — Z01.818 PRE-OP TESTING: Primary | ICD-10-CM

## 2022-09-19 DIAGNOSIS — R91.8 OTHER NONSPECIFIC ABNORMAL FINDING OF LUNG FIELD: ICD-10-CM

## 2022-09-19 PROCEDURE — 87070 CULTURE OTHR SPECIMN AEROBIC: CPT

## 2022-09-19 PROCEDURE — 6360000004 HC RX CONTRAST MEDICATION: Performed by: RADIOLOGY

## 2022-09-19 PROCEDURE — 71260 CT THORAX DX C+: CPT

## 2022-09-19 RX ADMIN — IOPAMIDOL 70 ML: 755 INJECTION, SOLUTION INTRAVENOUS at 18:44

## 2022-09-21 LAB — CULTURE NOSE: NORMAL

## 2023-01-19 ENCOUNTER — HOSPITAL ENCOUNTER (OUTPATIENT)
Dept: CT IMAGING | Age: 63
Discharge: HOME | End: 2023-01-21
Payer: MEDICAID

## 2023-01-19 DIAGNOSIS — R91.8 OTHER NONSPECIFIC ABNORMAL FINDING OF LUNG FIELD: ICD-10-CM

## 2023-01-19 PROCEDURE — 71250 CT THORAX DX C-: CPT

## 2023-02-16 ENCOUNTER — HOSPITAL ENCOUNTER (EMERGENCY)
Age: 63
Discharge: HOME | End: 2023-02-16
Attending: STUDENT IN AN ORGANIZED HEALTH CARE EDUCATION/TRAINING PROGRAM
Payer: MEDICAID

## 2023-02-16 ENCOUNTER — APPOINTMENT (OUTPATIENT)
Dept: CT IMAGING | Age: 63
End: 2023-02-16
Payer: MEDICAID

## 2023-02-16 VITALS
OXYGEN SATURATION: 95 % | RESPIRATION RATE: 18 BRPM | DIASTOLIC BLOOD PRESSURE: 58 MMHG | TEMPERATURE: 97.4 F | HEART RATE: 89 BPM | SYSTOLIC BLOOD PRESSURE: 110 MMHG

## 2023-02-16 DIAGNOSIS — K61.0 PERIANAL ABSCESS: Primary | ICD-10-CM

## 2023-02-16 LAB
ALBUMIN SERPL-MCNC: 4.2 G/DL (ref 3.5–5.2)
ALP BLD-CCNC: 110 U/L (ref 35–104)
ALT SERPL-CCNC: 27 U/L (ref 0–32)
ANION GAP SERPL CALCULATED.3IONS-SCNC: 9 MMOL/L (ref 7–16)
AST SERPL-CCNC: 13 U/L (ref 0–31)
BASOPHILS ABSOLUTE: 0.17 E9/L (ref 0–0.2)
BASOPHILS RELATIVE PERCENT: 0.9 % (ref 0–2)
BILIRUB SERPL-MCNC: 0.5 MG/DL (ref 0–1.2)
BUN BLDV-MCNC: 13 MG/DL (ref 6–23)
BURR CELLS: ABNORMAL
CALCIUM SERPL-MCNC: 9 MG/DL (ref 8.6–10.2)
CHLORIDE BLD-SCNC: 107 MMOL/L (ref 98–107)
CO2: 22 MMOL/L (ref 22–29)
CREAT SERPL-MCNC: 0.6 MG/DL (ref 0.5–1)
EOSINOPHILS ABSOLUTE: 0.31 E9/L (ref 0.05–0.5)
EOSINOPHILS RELATIVE PERCENT: 1.7 % (ref 0–6)
GFR SERPL CREATININE-BSD FRML MDRD: >60 ML/MIN/1.73
GLUCOSE BLD-MCNC: 209 MG/DL (ref 74–99)
HCT VFR BLD CALC: 49.1 % (ref 34–48)
HEMOGLOBIN: 15.2 G/DL (ref 11.5–15.5)
LACTIC ACID: 1.5 MMOL/L (ref 0.5–2.2)
LYMPHOCYTES ABSOLUTE: 3.31 E9/L (ref 1.5–4)
LYMPHOCYTES RELATIVE PERCENT: 18.3 % (ref 20–42)
MCH RBC QN AUTO: 28.3 PG (ref 26–35)
MCHC RBC AUTO-ENTMCNC: 31 % (ref 32–34.5)
MCV RBC AUTO: 91.3 FL (ref 80–99.9)
MONOCYTES ABSOLUTE: 0.74 E9/L (ref 0.1–0.95)
MONOCYTES RELATIVE PERCENT: 4.3 % (ref 2–12)
NEUTROPHILS ABSOLUTE: 13.8 E9/L (ref 1.8–7.3)
NEUTROPHILS RELATIVE PERCENT: 74.8 % (ref 43–80)
OVALOCYTES: ABNORMAL
PDW BLD-RTO: 16.1 FL (ref 11.5–15)
PLATELET # BLD: 352 E9/L (ref 130–450)
PMV BLD AUTO: 10 FL (ref 7–12)
POIKILOCYTES: ABNORMAL
POTASSIUM REFLEX MAGNESIUM: 4.3 MMOL/L (ref 3.5–5)
RBC # BLD: 5.38 E12/L (ref 3.5–5.5)
SODIUM BLD-SCNC: 138 MMOL/L (ref 132–146)
TARGET CELLS: ABNORMAL
TOTAL PROTEIN: 6.8 G/DL (ref 6.4–8.3)
WBC # BLD: 18.4 E9/L (ref 4.5–11.5)

## 2023-02-16 PROCEDURE — 87040 BLOOD CULTURE FOR BACTERIA: CPT

## 2023-02-16 PROCEDURE — 99285 EMERGENCY DEPT VISIT HI MDM: CPT

## 2023-02-16 PROCEDURE — 2580000003 HC RX 258: Performed by: STUDENT IN AN ORGANIZED HEALTH CARE EDUCATION/TRAINING PROGRAM

## 2023-02-16 PROCEDURE — 6360000004 HC RX CONTRAST MEDICATION: Performed by: RADIOLOGY

## 2023-02-16 PROCEDURE — 46050 I&D PERIANAL ABSCESS SUPFC: CPT

## 2023-02-16 PROCEDURE — 74177 CT ABD & PELVIS W/CONTRAST: CPT

## 2023-02-16 PROCEDURE — 2500000003 HC RX 250 WO HCPCS: Performed by: STUDENT IN AN ORGANIZED HEALTH CARE EDUCATION/TRAINING PROGRAM

## 2023-02-16 PROCEDURE — 6370000000 HC RX 637 (ALT 250 FOR IP): Performed by: STUDENT IN AN ORGANIZED HEALTH CARE EDUCATION/TRAINING PROGRAM

## 2023-02-16 PROCEDURE — 85025 COMPLETE CBC W/AUTO DIFF WBC: CPT

## 2023-02-16 PROCEDURE — 83605 ASSAY OF LACTIC ACID: CPT

## 2023-02-16 PROCEDURE — 36415 COLL VENOUS BLD VENIPUNCTURE: CPT

## 2023-02-16 PROCEDURE — 80053 COMPREHEN METABOLIC PANEL: CPT

## 2023-02-16 RX ORDER — HYDROCODONE BITARTRATE AND ACETAMINOPHEN 5; 325 MG/1; MG/1
1 TABLET ORAL EVERY 8 HOURS PRN
Qty: 6 TABLET | Refills: 0 | Status: SHIPPED | OUTPATIENT
Start: 2023-02-16 | End: 2023-02-18

## 2023-02-16 RX ORDER — AMOXICILLIN AND CLAVULANATE POTASSIUM 875; 125 MG/1; MG/1
1 TABLET, FILM COATED ORAL ONCE
Status: COMPLETED | OUTPATIENT
Start: 2023-02-16 | End: 2023-02-16

## 2023-02-16 RX ORDER — FLUCONAZOLE 150 MG/1
150 TABLET ORAL ONCE
Qty: 1 TABLET | Refills: 0 | Status: SHIPPED | OUTPATIENT
Start: 2023-02-16 | End: 2023-02-16

## 2023-02-16 RX ORDER — AMOXICILLIN AND CLAVULANATE POTASSIUM 875; 125 MG/1; MG/1
1 TABLET, FILM COATED ORAL 2 TIMES DAILY
Qty: 14 TABLET | Refills: 0 | Status: SHIPPED | OUTPATIENT
Start: 2023-02-16 | End: 2023-02-23

## 2023-02-16 RX ORDER — 0.9 % SODIUM CHLORIDE 0.9 %
1000 INTRAVENOUS SOLUTION INTRAVENOUS ONCE
Status: COMPLETED | OUTPATIENT
Start: 2023-02-16 | End: 2023-02-16

## 2023-02-16 RX ORDER — LIDOCAINE HYDROCHLORIDE 10 MG/ML
20 INJECTION, SOLUTION INFILTRATION; PERINEURAL ONCE
Status: COMPLETED | OUTPATIENT
Start: 2023-02-16 | End: 2023-02-16

## 2023-02-16 RX ADMIN — IOPAMIDOL 75 ML: 755 INJECTION, SOLUTION INTRAVENOUS at 08:49

## 2023-02-16 RX ADMIN — SODIUM CHLORIDE 1000 ML: 9 INJECTION, SOLUTION INTRAVENOUS at 08:25

## 2023-02-16 RX ADMIN — LIDOCAINE HYDROCHLORIDE 20 ML: 10 INJECTION, SOLUTION INFILTRATION; PERINEURAL at 10:35

## 2023-02-16 RX ADMIN — AMOXICILLIN AND CLAVULANATE POTASSIUM 1 TABLET: 875; 125 TABLET, FILM COATED ORAL at 09:45

## 2023-02-16 ASSESSMENT — ENCOUNTER SYMPTOMS
PHOTOPHOBIA: 0
CHEST TIGHTNESS: 0
NAUSEA: 0
SHORTNESS OF BREATH: 0
ABDOMINAL PAIN: 0
ABDOMINAL DISTENTION: 0
VOMITING: 0
DIARRHEA: 0
COUGH: 0

## 2023-02-16 ASSESSMENT — PAIN DESCRIPTION - LOCATION: LOCATION: RECTUM

## 2023-02-16 ASSESSMENT — PAIN DESCRIPTION - ORIENTATION: ORIENTATION: LEFT

## 2023-02-16 ASSESSMENT — PAIN - FUNCTIONAL ASSESSMENT: PAIN_FUNCTIONAL_ASSESSMENT: 0-10

## 2023-02-16 ASSESSMENT — LIFESTYLE VARIABLES: HOW OFTEN DO YOU HAVE A DRINK CONTAINING ALCOHOL: NEVER

## 2023-02-16 ASSESSMENT — PAIN SCALES - GENERAL: PAINLEVEL_OUTOF10: 3

## 2023-02-16 NOTE — ED PROVIDER NOTES
Mary Carmen Sorenson is a 63year old female with PMH of diabetes, hyperlipidemia, diverticulitis who presented to emergency department with concern for abscess. Patient has had about 1 week of increasing pain near her anus. Today patient felt like the pain had worsened and she noticed some green drainage. Patient denies fever, chills, chest pain, shortness of breath, nausea, vomiting. Patient has not tried thing for symptoms. Patient does have history of diabetes. Patient is not tried thing for symptoms patient denies any recent antibiotic use. 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 Patient does not know date of last tetanus. Patient denies trauma    The history is provided by the patient and medical records. Review of Systems   Constitutional:  Negative for chills, diaphoresis, fatigue and fever. Eyes:  Negative for photophobia and visual disturbance. Respiratory:  Negative for cough, chest tightness and shortness of breath. Cardiovascular:  Negative for chest pain, palpitations and leg swelling. Gastrointestinal:  Negative for abdominal distention, abdominal pain, diarrhea, nausea and vomiting. Genitourinary:  Negative for dysuria. Musculoskeletal:  Negative for neck pain and neck stiffness. Skin:  Positive for wound. Negative for pallor and rash. Neurological:  Negative for headaches. Psychiatric/Behavioral:  Negative for confusion. Physical Exam  Vitals and nursing note reviewed. Constitutional:       General: She is not in acute distress. Appearance: Normal appearance. She is not ill-appearing. HENT:      Head: Normocephalic and atraumatic. Eyes:      General: No scleral icterus. Conjunctiva/sclera: Conjunctivae normal.      Pupils: Pupils are equal, round, and reactive to light. Cardiovascular:      Rate and Rhythm: Normal rate and regular rhythm.    Pulmonary: Effort: Pulmonary effort is normal.      Breath sounds: Normal breath sounds. Abdominal:      General: Bowel sounds are normal. There is no distension. Palpations: Abdomen is soft. Tenderness: There is no abdominal tenderness. There is no guarding or rebound. Genitourinary:     Comments: Large fluctuant tender abscess near anus no crepitus no significant induration to surrounding skin purulent drainage from abscess  Musculoskeletal:      Cervical back: Normal range of motion and neck supple. No rigidity. No muscular tenderness. Right lower leg: No edema. Left lower leg: No edema. Skin:     General: Skin is warm and dry. Capillary Refill: Capillary refill takes less than 2 seconds. Coloration: Skin is not pale. Findings: No erythema or rash. Neurological:      Mental Status: She is alert and oriented to person, place, and time. Psychiatric:         Mood and Affect: Mood normal.        Procedures   Incision and Drainage Procedure Note    Indication: Abscess    Procedure: The patient was positioned appropriately and the skin over the incision site was prepped with betadine. Local anesthesia was obtained by infiltration using 1% Lidocaine without epinephrine. An incision was then made over the greatest area of fluctuance and approximately 20 cc of thick, foul smelling, and purulent material was expressed. Loculations were not present. The drainage cavity was then dressed with a bandage. The patients tetanus status was updated with a tetanus booster. The patient tolerated the procedure well. Complications: None       MDM  Number of Diagnoses or Management Options  Perianal abscess  Diagnosis management comments: Louisa Carter is a 60-year-old female presented to emergency department with concern for abscess and wound. Patient presented with 1 week of symptoms. Patient has a tender mass likely abscess near anus that is currently draining.   Patient does have minor induration to surrounding skin extending to anus CT scan was ordered to evaluate for possible fistula or complication from the abscess  Patient was stable repeat evaluation  Patient's tetanus was not up-to-date and tetanus was ordered  Lab work was significant for a leukocytosis patient also has a history of splenectomy blood cultures were sent however patient is afebrile does not have elevated lactic, patient had previous leukocytosis similar on previous lab work ordered by PCP about 8  months ago, platelets 901 normal  CT scan was significant for abscess did not show any findings concerning for necrotizing fasciitis  Bedside I&D was performed patient was afebrile patient was not tachycardic repeat evaluation without any acute distress advised patient to return immediately to emergency department should any worsening symptoms or symptoms do not improve, she is agreeable to plan   Patient denies any previous abdominal surgeries however per records patient does have a history of a splenectomy records reviewed from splenectomy 7/2021    Comorbidities contributing to patient's care include patient does have a history of diabetes and hyperlipidemia, patient is diabetes cancer high risk for poor healing  Differential diagnosis considered not limited to abscess, perianal abscess, perirectal abscess, necrotizing fasciitis,              --------------------------------------------- PAST HISTORY ---------------------------------------------  Past Medical History:  has a past medical history of Diabetes mellitus ([address] and Hyperlipidemia. Past Surgical History:  has a past surgical history that includes Tonsillectomy; Dilation and curettage of uterus; and colectomy (N/A, 7/22/2021). Social History:  reports that she has been smoking cigarettes. She has been smoking an average of 1 pack per day. She has never used smokeless tobacco. She reports current alcohol use. She reports that she does not use drugs.     Family History: family history is not on file. The patients home medications have been reviewed. Allergies: Patient has no known allergies.     -------------------------------------------------- RESULTS -------------------------------------------------  Labs:  Results for orders placed or performed during the hospital encounter of 02/16/23   Culture, Blood 1    Specimen: Blood   Result Value Ref Range    Blood Culture, Routine 24 Hours no growth    Culture, Blood 2    Specimen: Blood   Result Value Ref Range    Culture, Blood 2 24 Hours no growth    CBC with Auto Differential   Result Value Ref Range    WBC 18.4 (H) 4.5 - 11.5 E9/L    RBC 5.38 3.50 - 5.50 E12/L    Hemoglobin 15.2 11.5 - 15.5 g/dL    Hematocrit 49.1 (H) 34.0 - 48.0 %    MCV 91.3 80.0 - 99.9 fL    MCH 28.3 26.0 - 35.0 pg    MCHC 31.0 (L) 32.0 - 34.5 %    RDW 16.1 (H) 11.5 - 15.0 fL    Platelets 393 047 - 025 E9/L    MPV 10.0 7.0 - 12.0 fL    Neutrophils % 74.8 43.0 - 80.0 %    Lymphocytes % 18.3 (L) 20.0 - 42.0 %    Monocytes % 4.3 2.0 - 12.0 %    Eosinophils % 1.7 0.0 - 6.0 %    Basophils % 0.9 0.0 - 2.0 %    Neutrophils Absolute 13.80 (H) 1.80 - 7.30 E9/L    Lymphocytes Absolute 3.31 1.50 - 4.00 E9/L    Monocytes Absolute 0.74 0.10 - 0.95 E9/L    Eosinophils Absolute 0.31 0.05 - 0.50 E9/L    Basophils Absolute 0.17 0.00 - 0.20 E9/L    Poikilocytes 2+     Zirconia Cells 2+     Ovalocytes 1+     Target Cells 1+    Comprehensive Metabolic Panel w/ Reflex to MG   Result Value Ref Range    Sodium 138 132 - 146 mmol/L    Potassium reflex Magnesium 4.3 3.5 - 5.0 mmol/L    Chloride 107 98 - 107 mmol/L    CO2 22 22 - 29 mmol/L    Anion Gap 9 7 - 16 mmol/L    Glucose 209 (H) 74 - 99 mg/dL    BUN 13 6 - 23 mg/dL    Creatinine 0.6 0.5 - 1.0 mg/dL    Est, Glom Filt Rate >60 >=60 mL/min/1.73    Calcium 9.0 8.6 - 10.2 mg/dL    Total Protein 6.8 6.4 - 8.3 g/dL    Albumin 4.2 3.5 - 5.2 g/dL    Total Bilirubin 0.5 0.0 - 1.2 mg/dL    Alkaline Phosphatase 110 (H) 35 - 104 U/L    ALT 27 0 - 32 U/L    AST 13 0 - 31 U/L   Lactic Acid   Result Value Ref Range    Lactic Acid 1.5 0.5 - 2.2 mmol/L       Radiology:  CT ABDOMEN PELVIS W IV CONTRAST Additional Contrast? None   Final Result   Left perianal abscess identified small in size with minimal surrounding   stranding. No CT evidence of acute intra-abdominopelvic abnormality. Extensive   diverticulosis of the colon with stool to suggest mild clinical presentation   of constipation.             ------------------------- NURSING NOTES AND VITALS REVIEWED ---------------------------  Date / Time Roomed:  2/16/2023  7:14 AM  ED Bed Assignment:  09/09    The nursing notes within the ED encounter and vital signs as below have been reviewed. BP (!) 110/58   Pulse 89   Temp 97.4 °F (36.3 °C)   Resp 18   SpO2 95%   Oxygen Saturation Interpretation: Normal      ------------------------------------------ PROGRESS NOTES ------------------------------------------  10:50 AM EST  I have spoken with the patient and discussed todays results, in addition to providing specific details for the plan of care and counseling regarding the diagnosis and prognosis. Their questions are answered at this time and they are agreeable with the plan. I discussed at length with them reasons for immediate return here for re evaluation. They will followup with their primary care physician by calling their office tomorrow. --------------------------------- ADDITIONAL PROVIDER NOTES ---------------------------------  At this time the patient is without objective evidence of an acute process requiring hospitalization or inpatient management. They have remained hemodynamically stable throughout their entire ED visit and are stable for discharge with outpatient follow-up. The plan has been discussed in detail and they are aware of the specific conditions for emergent return, as well as the importance of follow-up.       Discharge Medication List as of 2/16/2023 10:26 AM        START taking these medications    Details   fluconazole (DIFLUCAN) 150 MG tablet Take 1 tablet by mouth once for 1 dose, Disp-1 tablet, R-0Normal      amoxicillin-clavulanate (AUGMENTIN) 875-125 MG per tablet Take 1 tablet by mouth 2 times daily for 7 days, Disp-14 tablet, R-0Normal      HYDROcodone-acetaminophen (NORCO) 5-325 MG per tablet Take 1 tablet by mouth every 8 hours as needed for Pain for up to 2 days. Intended supply: 3 days. Take lowest dose possible to manage pain Max Daily Amount: 3 tablets, Disp-6 tablet, R-0Normal             Diagnosis:  1. Perianal abscess        Disposition:  Patient's disposition: Discharge to home  Patient's condition is stable.              Eliceo Nails MD  02/18/23 6007

## 2023-02-17 LAB
BLOOD CULTURE, ROUTINE: NORMAL
CULTURE, BLOOD 2: NORMAL

## 2023-02-21 LAB
BLOOD CULTURE, ROUTINE: NORMAL
CULTURE, BLOOD 2: NORMAL

## 2023-03-07 ENCOUNTER — HOSPITAL ENCOUNTER (EMERGENCY)
Age: 63
Discharge: HOME | End: 2023-03-07
Attending: EMERGENCY MEDICINE
Payer: MEDICAID

## 2023-03-07 VITALS
DIASTOLIC BLOOD PRESSURE: 72 MMHG | TEMPERATURE: 98.4 F | RESPIRATION RATE: 18 BRPM | OXYGEN SATURATION: 95 % | HEART RATE: 95 BPM | SYSTOLIC BLOOD PRESSURE: 129 MMHG

## 2023-03-07 DIAGNOSIS — J02.9 ACUTE PHARYNGITIS, UNSPECIFIED ETIOLOGY: Primary | ICD-10-CM

## 2023-03-07 LAB
INFLUENZA A BY PCR: NOT DETECTED
INFLUENZA B BY PCR: NOT DETECTED
STREP GRP A PCR: NEGATIVE

## 2023-03-07 PROCEDURE — 99284 EMERGENCY DEPT VISIT MOD MDM: CPT

## 2023-03-07 PROCEDURE — 87502 INFLUENZA DNA AMP PROBE: CPT

## 2023-03-07 PROCEDURE — 87880 STREP A ASSAY W/OPTIC: CPT

## 2023-03-07 RX ORDER — AMOXICILLIN 500 MG/1
500 CAPSULE ORAL 2 TIMES DAILY
Qty: 20 CAPSULE | Refills: 0 | Status: SHIPPED | OUTPATIENT
Start: 2023-03-07 | End: 2023-03-17

## 2023-03-07 ASSESSMENT — LIFESTYLE VARIABLES: HOW OFTEN DO YOU HAVE A DRINK CONTAINING ALCOHOL: NEVER

## 2023-03-07 NOTE — ED PROVIDER NOTES
[address]        Pt Name: Casie Solorzano  MRN: [id number]  [place] 1960  Date of evaluation: 3/7/2023  Provider: Yasmine Cole DO  PCP: Jesus Pereyra DO  Note Started: 7:42 AM EST 3/7/23    CHIEF COMPLAINT       Chief Complaint   Patient presents with    Pharyngitis     Started 3/1 and [facility] . Pt given cough medication, and no other tests. Pt still has sore throat        HISTORY OF PRESENT ILLNESS: 1 or more Elements   History From: patient    Limitations to history : None    Casie Solorzano is a 63 y.o. female who presents to the ED for evaluation of sore throat. Has been present since last Wednesday. States she describes as a irritation. Worse at night. No reported fever or chills. Patient states that she had just recently got over [employer]. Took a COVID test yesterday and it was negative. Was seen at the urgent care and was diagnosed with bronchitis several days ago. She said that they did look in her throat but did not look with a light or a tongue depressor. They said that there was no evidence of strep. Patient has had history of tonsillectomy. 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 Associated nausea, vomiting, or diarrhea. Reports mild nasal congestion without rhinorrhea. No cough. No shortness of breath or chest pain. Nursing Notes were all reviewed and agreed with or any disagreements were addressed in the HPI. REVIEW OF EXTERNAL NOTE :           REVIEW OF SYSTEMS :           Positives and Pertinent negatives as per HPI.      SURGICAL HISTORY     Past Surgical History:   Procedure Laterality Date    COLECTOMY N/A 7/22/2021    SPLENECTOMY,  LAPAROSCOPIC ROBOTIC XI performed by Maurice Burroughs MD at [address]       Previous Medications    ATORVASTATIN (LIPITOR) 40 MG TABLET    Take 40 mg by mouth daily    DULAGLUTIDE (TRULICITY) 1.5 TX/1.2XX SOPN    Inject 1.5 mg into the skin once a week     ERTUGLIFLOZIN L-PYROGLUTAMICAC (STEGLATRO) 15 MG TABS    Take 15 mg by mouth    GLYBURIDE (DIABETA) 2.5 MG TABLET    Take 5 mg by mouth 2 times daily (with meals)     IBUPROFEN (IBU) 800 MG TABLET    Take 1 tablet by mouth every 6 hours as needed for Pain    LANCETS (ONETOUCH DELICA PLUS [id number]) MISC    USE AS DIRECTED TWICE DAILY    LINACLOTIDE (LINZESS) 290 MCG CAPS CAPSULE    Take 145 mcg by mouth every morning (before breakfast)     MENINGOCOCCAL A C Y&W-135 CONJ (MENACTRA IM)    Inject into the muscle    METFORMIN (GLUCOPHAGE) 1000 MG TABLET    Take 1,000 mg by mouth 2 times daily (with meals)    ONDANSETRON (ZOFRAN-ODT) 4 MG DISINTEGRATING TABLET    Take 1 tablet by mouth every 8 hours as needed for Nausea or Vomiting    SENNOSIDES-DOCUSATE SODIUM (SENOKOT-S) 8.6-50 MG TABLET    Take 1 tablet by mouth nightly       ALLERGIES     Patient has no known allergies. FAMILYHISTORY     No family history on file.      SOCIAL HISTORY       Social History     Tobacco Use    Smoking status: Every Day     Packs/day: 1.00     Types: Cigarettes    Smokeless tobacco: Never   Substance Use Topics    Alcohol use: Yes     Comment: occ    Drug use: No       SCREENINGS        Lee Coma Scale  Eye Opening: Spontaneous  Best Verbal Response: Oriented  Best Motor Response: Obeys commands  Lee Coma Scale Score: 15                CIWA Assessment  BP: 129/72  Heart Rate: 95           PHYSICAL EXAM  1 or more Elements     ED Triage Vitals   BP Temp Temp Source Heart Rate Resp SpO2 Height Weight   03/07/23 0732 03/07/23 0728 03/07/23 0728 03/07/23 0728 03/07/23 0728 03/07/23 0728 -- --   129/72 98.4 °F (36.9 °C) Oral 95 18 95 %         Constitutional/General: Alert and oriented x3  Head: Normocephalic   Eyes: PERRL, EOMI, conjunctiva normal, sclera non icteric  ENT:  Oropharynx clear, handling secretions, no trismus, no asymmetry of the posterior oropharynx or uvular edema. Mild erythema of the posterior oropharynx. No exudate. Tonsils are surgically absent  Neck: Supple, full ROM, no stridor, no meningeal signs  Respiratory: Lungs clear to auscultation bilaterally, no wheezes, rales, or rhonchi. Cardiovascular:  Regular rate. Regular rhythm. No murmurs, no gallops, no rubs   Chest: No chest wall tenderness  GI:  Abdomen Soft, Non tender. Musculoskeletal: Moves all extremities x 4. Warm and well perfused, no clubbing, no cyanosis, no edema. Capillary refill <3 seconds  Integument: skin warm and dry. No rashes. Neurologic: GCS 15, no focal deficits,  Psychiatric: Normal Affect            DIAGNOSTIC RESULTS   LABS:    Labs Reviewed   RAPID INFLUENZA A/B ANTIGENS   STREP SCREEN GROUP A THROAT       As interpreted by me, the above displayed labs are abnormal. All other labs obtained during this visit were within normal range or not returned as of this dictation. No orders to display     No results found. No results found. PROCEDURES   Unless otherwise noted below, none          CRITICAL CARE TIME (.cct)   na    PAST MEDICAL HISTORY/Chronic Conditions Affecting Care      has a past medical history of Diabetes mellitus ([facility] [address] and Hyperlipidemia. EMERGENCY DEPARTMENT COURSE    Vitals:    Vitals:    03/07/23 0728 03/07/23 0732   BP:  129/72   Pulse: 95    Resp: 18    Temp: 98.4 °F (36.9 °C)    TempSrc: Oral    SpO2: 95%        Patient was given the following medications:  Medications - No data to display        Is this patient to be included in the SEP-1 Core Measure due to severe sepsis or septic shock?    No Exclusion criteria - the patient is NOT to be included for SEP-1 Core Measure due to: 2+ SIRS criteria are not met        Medical Decision Making/Differential Diagnosis:    CC/HPI Summary, Social Determinants of health, Records Reviewed, DDx, testing done/not done, ED Course, Reassessment, disposition considerations/shared decision making with patient, consults, disposition:            Medical Decision Making  Amount and/or Complexity of Data Reviewed  Labs: ordered. Risk  Prescription drug management. Patient presents to the ED for evaluation of sore throat. Has been present since last Wednesday. States she describes as a irritation. Worse at night. No reported fever or chills. Patient states that [facility] had just [facility] Took a COVID test yesterday and it was negative. Was seen at the urgent care and was diagnosed with bronchitis several days ago. On exam patient has mild erythema of the posterior pharynx. No uvular deviation. No exudate. Tonsils are surgically absent. And excretions without difficulty. Differential diagnoses included but not limited to pharyngitis, pharyngitis from other viral etiology, URI. Workup in the ED consisted of swabs. Influenza AMB as well as strep swabs were interpreted as negative by myself. Findings were discussed with the patient and reasons to immediately return to the ED were articulated to them. They will follow-up with their PMD.      CONSULTS: (Who and What was discussed)  None        I am the Primary Clinician of Record. FINAL IMPRESSION      1. Acute pharyngitis, unspecified etiology          DISPOSITION/PLAN     DISPOSITION Decision To Discharge 03/07/2023 08:53:46 AM      PATIENT REFERRED TO:  Julito Quiros DO  [address] [place] [address]  [phone]    Call       DISCHARGE MEDICATIONS:  New Prescriptions    AMOXICILLIN (AMOXIL) 500 MG CAPSULE    Take 1 capsule by mouth 2 times daily for 10 days       DISCONTINUED MEDICATIONS:  Discontinued Medications    No medications on file              (Please note that portions of this note were completed with a voice recognition program.  Efforts were made to edit the dictations but occasionally words are mis-transcribed. )    Sugar Mccormick DO (electronically signed)            Ayush Correa DO  03/07/23 1680

## 2023-04-24 NOTE — ANESTHESIA PRE PROCEDURE
Department of Anesthesiology  Preprocedure Note       Name:  Archie Moreland   Age:  63 y.o.  :  1960                                          MRN:  [id number]         Date:  2021      Surgeon: Anupama Gamboa):  Eulogio Angulo MD    Procedure: Procedure(s):  SPLENECTOMY,  LAPAROSCOPIC ROBOTIC XI    Medications prior to admission:   Prior to Admission medications    Medication Sig Start Date End Date Taking? Authorizing Provider   sennosides-docusate sodium (SENOKOT-S) 8.6-50 MG tablet Take 1 tablet by mouth nightly    Historical Provider, MD   Lancets ([address] [place]) [address] [facility] USE AS DIRECTED TWICE DAILY 21   Historical Provider, MD   atorvastatin (LIPITOR) 40 MG tablet Take 40 mg by mouth daily    Historical Provider, MD   Ertugliflozin L-PyroglutamicAc (STEGLATRO) 15 MG TABS Take 15 mg by mouth    Historical Provider, MD   linaclotide (LINZESS) 290 MCG CAPS capsule Take 145 mcg by mouth every morning (before breakfast)     Historical Provider, MD   Dulaglutide (TRULICITY) 1.5 OW/1.3WT SOPN Inject 1.5 mg into the skin once a week     Historical Provider, MD   metFORMIN (GLUCOPHAGE) 1000 MG tablet Take 1,000 mg by mouth 2 times daily (with meals)    Historical Provider, MD   glyBURIDE (DIABETA) 2.5 MG tablet Take 5 mg by mouth 2 times daily (with meals)     Historical Provider, MD       Current medications:    No current facility-administered medications for this encounter.      Current Outpatient Medications   Medication Sig Dispense Refill    sennosides-docusate sodium (SENOKOT-S) 8.6-50 MG tablet Take 1 tablet by mouth nightly      Lancets (ONETOUCH DELICA PLUS [id number]) MISC USE AS DIRECTED TWICE DAILY      atorvastatin (LIPITOR) 40 MG tablet Take 40 mg by mouth daily      Ertugliflozin L-PyroglutamicAc (STEGLATRO) 15 MG TABS Take 15 mg by mouth      linaclotide (LINZESS) 290 MCG CAPS capsule Take 145 mcg by mouth every morning (before breakfast)       Dulaglutide (TRULICITY) 1.5 MG/0.5ML SOPN Inject 1.5 mg into the skin once a week       metFORMIN (GLUCOPHAGE) 1000 MG tablet Take 1,000 mg by mouth 2 times daily (with meals)      glyBURIDE (DIABETA) 2.5 MG tablet Take 5 mg by mouth 2 times daily (with meals)          Allergies:  No Known Allergies    Problem List:    Patient Active Problem List   Diagnosis Code    Thrombocytopenia (HCC) D69.6       Past Medical History:        Diagnosis Date    Diabetes mellitus ([facility] [address]     Hyperlipidemia        Past Surgical History:        Procedure Laterality Date    DILATION AND CURETTAGE OF UTERUS      TONSILLECTOMY         Social History:    Social History     Tobacco Use    Smoking status: Current Every Day Smoker     Packs/day: 1.00     Types: Cigarettes    Smokeless tobacco: Never Used   Substance Use Topics    Alcohol use: Yes     Comment: occ                                Ready to quit: Not Answered  Counseling given: Not Answered      Vital Signs (Current): There were no vitals filed for this visit.                                            BP Readings from Last 3 Encounters:   06/30/21 129/62   03/22/21 108/60   10/17/20 114/65       NPO Status:                                                                                 BMI:   Wt Readings from Last 3 Encounters:   07/19/21 158 lb (71.7 kg)   06/30/21 157 lb (71.2 kg)   10/17/20 162 lb (73.5 kg)     There is no height or weight on file to calculate BMI.    CBC:   Lab Results   Component Value Date    WBC 7.4 07/19/2021    RBC 5.85 07/19/2021    HGB 16.0 07/19/2021    HCT 49.6 07/19/2021    MCV 84.8 07/19/2021    RDW 14.8 07/19/2021     07/19/2021       CMP:   Lab Results   Component Value Date     07/19/2021    K 4.2 07/19/2021     07/19/2021    CO2 23 07/19/2021    BUN 17 07/19/2021    CREATININE 0.7 07/19/2021    GFRAA >60 07/19/2021    LABGLOM >60 07/19/2021    GLUCOSE 146 07/19/2021    GLUCOSE 316 04/18/2011    PROT 6.9 03/22/2021    CALCIUM 9.5 07/19/2021 BILITOT 0.3 03/22/2021    ALKPHOS 90 03/22/2021    AST 15 03/22/2021    ALT 18 03/22/2021       POC Tests: No results for input(s): POCGLU, POCNA, POCK, POCCL, POCBUN, POCHEMO, POCHCT in the last 72 hours. Coags:   Lab Results   Component Value Date    PROTIME 10.5 07/19/2021    INR 0.9 07/19/2021    APTT 35.2 07/19/2021       HCG (If Applicable): No results found for: PREGTESTUR, PREGSERUM, HCG, HCGQUANT     ABGs: No results found for: PHART, PO2ART, GSR0MPI, WLF1FNF, BEART, G2QYJWDR     Type & Screen (If Applicable):  No results found for: LABABO, LABRH    Drug/Infectious Status (If Applicable):  No results found for: HIV, HEPCAB    COVID-19 Screening (If Applicable): No results found for: COVID19        Anesthesia Evaluation  Patient summary reviewed  Airway: Mallampati: III  TM distance: >3 FB   Neck ROM: full  Mouth opening: > = 3 FB Dental: normal exam     Comment: Dentition intact, patient denies any loose teeth. Pulmonary: breath sounds clear to auscultation  (+) current smoker ( 0.5 - 1.0 PPD x 30 years )          Patient did not smoke on day of surgery. Cardiovascular:Negative CV ROS    (+) PND, hyperlipidemia        Rhythm: regular  Rate: normal                 ROS comment: EKG: Normal sinus rhythm  Normal ECG  When compared with ECG of 20-FEB-2020 08:36,  No significant change was found  Confirmed by Maurie Severe ([id number]) on 7/19/2021 10:06:24 PM     Neuro/Psych:   Negative Neuro/Psych ROS              GI/Hepatic/Renal:            ROS comment: IBS with constipation. Endo/Other:    (+) DiabetesType II DM, , blood dyscrasia: thrombocytopenia:., .                 Abdominal:             Vascular: negative vascular ROS. Other Findings:           Anesthesia Plan      general     ASA 3       Induction: intravenous. MIPS: Postoperative opioids intended. Anesthetic plan and risks discussed with patient. Plan discussed with CRNA.                 Jossue Henley MD 7/21/2021 Validate Previous Accession (Can Hide Previous Accession In Settings Tab): No

## 2023-04-25 ENCOUNTER — HOSPITAL ENCOUNTER (EMERGENCY)
Age: 63
Discharge: HOME | End: 2023-04-26
Attending: EMERGENCY MEDICINE
Payer: MEDICAID

## 2023-04-25 DIAGNOSIS — K57.32 DIVERTICULITIS OF COLON: Primary | ICD-10-CM

## 2023-04-25 LAB
ALBUMIN SERPL-MCNC: 4.2 G/DL (ref 3.5–5.2)
ALP SERPL-CCNC: 110 U/L (ref 35–104)
ALT SERPL-CCNC: 18 U/L (ref 0–32)
ANION GAP SERPL CALCULATED.3IONS-SCNC: 11 MMOL/L (ref 7–16)
AST SERPL-CCNC: 13 U/L (ref 0–31)
BILIRUB SERPL-MCNC: 0.3 MG/DL (ref 0–1.2)
BUN SERPL-MCNC: 11 MG/DL (ref 6–23)
CALCIUM SERPL-MCNC: 9.5 MG/DL (ref 8.6–10.2)
CHLORIDE SERPL-SCNC: 103 MMOL/L (ref 98–107)
CO2 SERPL-SCNC: 22 MMOL/L (ref 22–29)
CREAT SERPL-MCNC: 0.6 MG/DL (ref 0.5–1)
GLUCOSE SERPL-MCNC: 209 MG/DL (ref 74–99)
LACTATE BLDV-SCNC: 2.6 MMOL/L (ref 0.5–2.2)
POTASSIUM SERPL-SCNC: 4.4 MMOL/L (ref 3.5–5)
PROT SERPL-MCNC: 6.9 G/DL (ref 6.4–8.3)
SODIUM SERPL-SCNC: 136 MMOL/L (ref 132–146)

## 2023-04-25 PROCEDURE — 6360000002 HC RX W HCPCS

## 2023-04-25 PROCEDURE — 83690 ASSAY OF LIPASE: CPT

## 2023-04-25 PROCEDURE — 36415 COLL VENOUS BLD VENIPUNCTURE: CPT

## 2023-04-25 PROCEDURE — 96374 THER/PROPH/DIAG INJ IV PUSH: CPT

## 2023-04-25 PROCEDURE — 99285 EMERGENCY DEPT VISIT HI MDM: CPT

## 2023-04-25 PROCEDURE — 2580000003 HC RX 258

## 2023-04-25 PROCEDURE — 85025 COMPLETE CBC W/AUTO DIFF WBC: CPT

## 2023-04-25 PROCEDURE — 83605 ASSAY OF LACTIC ACID: CPT

## 2023-04-25 PROCEDURE — 80053 COMPREHEN METABOLIC PANEL: CPT

## 2023-04-25 RX ORDER — 0.9 % SODIUM CHLORIDE 0.9 %
1000 INTRAVENOUS SOLUTION INTRAVENOUS ONCE
Status: COMPLETED | OUTPATIENT
Start: 2023-04-25 | End: 2023-04-25

## 2023-04-25 RX ORDER — KETOROLAC TROMETHAMINE 15 MG/ML
15 INJECTION, SOLUTION INTRAMUSCULAR; INTRAVENOUS ONCE
Status: COMPLETED | OUTPATIENT
Start: 2023-04-25 | End: 2023-04-25

## 2023-04-25 RX ADMIN — SODIUM CHLORIDE 1000 ML: 9 INJECTION, SOLUTION INTRAVENOUS at 22:20

## 2023-04-25 RX ADMIN — KETOROLAC TROMETHAMINE 15 MG: 15 INJECTION, SOLUTION INTRAMUSCULAR; INTRAVENOUS at 22:21

## 2023-04-25 ASSESSMENT — PAIN SCALES - GENERAL
PAINLEVEL_OUTOF10: 6
PAINLEVEL_OUTOF10: 6

## 2023-04-25 ASSESSMENT — PAIN - FUNCTIONAL ASSESSMENT: PAIN_FUNCTIONAL_ASSESSMENT: 0-10

## 2023-04-26 ENCOUNTER — APPOINTMENT (OUTPATIENT)
Dept: CT IMAGING | Age: 63
End: 2023-04-26
Payer: MEDICAID

## 2023-04-26 VITALS
OXYGEN SATURATION: 97 % | WEIGHT: 162 LBS | RESPIRATION RATE: 16 BRPM | BODY MASS INDEX: 28.7 KG/M2 | HEART RATE: 98 BPM | SYSTOLIC BLOOD PRESSURE: 119 MMHG | TEMPERATURE: 98 F | DIASTOLIC BLOOD PRESSURE: 66 MMHG

## 2023-04-26 LAB
BASOPHILS # BLD: 0.15 E9/L (ref 0–0.2)
BASOPHILS NFR BLD: 0.9 % (ref 0–2)
BURR CELLS: ABNORMAL
EOSINOPHIL # BLD: 0.29 E9/L (ref 0.05–0.5)
EOSINOPHIL NFR BLD: 1.7 % (ref 0–6)
ERYTHROCYTE [DISTWIDTH] IN BLOOD BY AUTOMATED COUNT: 15 FL (ref 11.5–15)
HCT VFR BLD AUTO: 50.1 % (ref 34–48)
HGB BLD-MCNC: 15.4 G/DL (ref 11.5–15.5)
LACTATE BLDV-SCNC: 2.6 MMOL/L (ref 0.5–2.2)
LIPASE: 63 U/L (ref 13–60)
LYMPHOCYTES # BLD: 3.74 E9/L (ref 1.5–4)
LYMPHOCYTES NFR BLD: 21.6 % (ref 20–42)
MCH RBC QN AUTO: 28.7 PG (ref 26–35)
MCHC RBC AUTO-ENTMCNC: 30.7 % (ref 32–34.5)
MCV RBC AUTO: 93.3 FL (ref 80–99.9)
MONOCYTES # BLD: 0.51 E9/L (ref 0.1–0.95)
MONOCYTES NFR BLD: 3.4 % (ref 2–12)
NEUTROPHILS # BLD: 12.24 E9/L (ref 1.8–7.3)
NEUTS SEG NFR BLD: 72.4 % (ref 43–80)
OVALOCYTES: ABNORMAL
PLATELET # BLD AUTO: 415 E9/L (ref 130–450)
PMV BLD AUTO: 10.2 FL (ref 7–12)
POIKILOCYTES: ABNORMAL
POLYCHROMASIA: ABNORMAL
RBC # BLD AUTO: 5.37 E12/L (ref 3.5–5.5)
TARGET CELLS: ABNORMAL
TEAR DROP CELLS: ABNORMAL
WBC # BLD: 17 E9/L (ref 4.5–11.5)

## 2023-04-26 PROCEDURE — 74177 CT ABD & PELVIS W/CONTRAST: CPT

## 2023-04-26 PROCEDURE — 83605 ASSAY OF LACTIC ACID: CPT

## 2023-04-26 PROCEDURE — 6370000000 HC RX 637 (ALT 250 FOR IP): Performed by: EMERGENCY MEDICINE

## 2023-04-26 PROCEDURE — 6360000004 HC RX CONTRAST MEDICATION: Performed by: RADIOLOGY

## 2023-04-26 PROCEDURE — 6360000002 HC RX W HCPCS: Performed by: EMERGENCY MEDICINE

## 2023-04-26 PROCEDURE — 96375 TX/PRO/DX INJ NEW DRUG ADDON: CPT

## 2023-04-26 PROCEDURE — 2580000003 HC RX 258: Performed by: EMERGENCY MEDICINE

## 2023-04-26 RX ORDER — METRONIDAZOLE 500 MG/1
500 TABLET ORAL ONCE
Status: COMPLETED | OUTPATIENT
Start: 2023-04-26 | End: 2023-04-26

## 2023-04-26 RX ORDER — CEFDINIR 300 MG/1
300 CAPSULE ORAL 2 TIMES DAILY
Qty: 14 CAPSULE | Refills: 0 | Status: SHIPPED | OUTPATIENT
Start: 2023-04-26 | End: 2023-05-03

## 2023-04-26 RX ORDER — METRONIDAZOLE 500 MG/1
500 TABLET ORAL 2 TIMES DAILY
Qty: 14 TABLET | Refills: 0 | Status: SHIPPED | OUTPATIENT
Start: 2023-04-26 | End: 2023-05-03

## 2023-04-26 RX ORDER — FLUCONAZOLE 150 MG/1
150 TABLET ORAL DAILY
Qty: 7 TABLET | Refills: 0 | Status: SHIPPED | OUTPATIENT
Start: 2023-04-26 | End: 2023-05-03

## 2023-04-26 RX ADMIN — IOPAMIDOL 80 ML: 755 INJECTION, SOLUTION INTRAVENOUS at 00:35

## 2023-04-26 RX ADMIN — CEFTRIAXONE SODIUM 2000 MG: 2 INJECTION, POWDER, FOR SOLUTION INTRAMUSCULAR; INTRAVENOUS at 01:23

## 2023-04-26 RX ADMIN — METRONIDAZOLE 500 MG: 500 TABLET ORAL at 01:22

## 2023-04-26 ASSESSMENT — PAIN - FUNCTIONAL ASSESSMENT: PAIN_FUNCTIONAL_ASSESSMENT: NONE - DENIES PAIN

## 2023-04-26 NOTE — ED PROVIDER NOTES
77-year-old female with history of thrombocytopenia and a splenectomy in 2021. She also has a history of diverticulosis. She comes emerged department for the concerns of what she believes is having a diverticulitis episode. Her last episode was about 2 to 3 years ago. She stated she ate spicy food which may have altered her diverticulitis. She describes her pain in the left lower quadrant started at 5:00 in the morning. She describes as a achy type pain that is nonradiating. She denies following: Nausea, vomiting, chest pain, shortness of breath, urinary symptoms, GI bleed, fever, cough. Chief Complaint   Patient presents with    Flank Pain     Rt sided since 500am no vomiting or diarrhea       Review of Systems   Pertinent review of systems in HPI  Physical Exam  Vitals reviewed. Constitutional:       General: She is not in acute distress. Appearance: She is not ill-appearing. HENT:      Head: Normocephalic. Right Ear: External ear normal.      Left Ear: External ear normal.      Nose: Nose normal.      Mouth/Throat:      Mouth: Mucous membranes are moist.   Eyes:      General:         Right eye: No discharge. Left eye: No discharge. Conjunctiva/sclera: Conjunctivae normal.   Cardiovascular:      Rate and Rhythm: Normal rate and regular rhythm. Heart sounds: No friction rub. No gallop. Pulmonary:      Effort: No respiratory distress. Breath sounds: No stridor. Abdominal:      General: There is no distension. Tenderness: There is abdominal tenderness. There is no guarding or rebound. Comments: llq   Musculoskeletal:         General: No deformity or signs of injury. Cervical back: Normal range of motion and neck supple. No rigidity or tenderness. Skin:     Coloration: Skin is not jaundiced. Neurological:      General: No focal deficit present. Mental Status: She is alert and oriented to person, place, and time. Mental status is at baseline.

## 2023-05-23 ENCOUNTER — HOSPITAL ENCOUNTER (OUTPATIENT)
Dept: CT IMAGING | Age: 63
Discharge: HOME | End: 2023-05-23
Payer: MEDICAID

## 2023-05-23 DIAGNOSIS — R91.1 SOLITARY PULMONARY NODULE: ICD-10-CM

## 2023-05-23 PROCEDURE — 71250 CT THORAX DX C-: CPT

## 2023-07-10 ENCOUNTER — HOSPITAL ENCOUNTER (OUTPATIENT)
Dept: GENERAL RADIOLOGY | Age: 63
Discharge: HOME | End: 2023-07-12
Payer: MEDICAID

## 2023-07-10 ENCOUNTER — HOSPITAL ENCOUNTER (OUTPATIENT)
Age: 63
Discharge: HOME | End: 2023-07-12
Payer: MEDICAID

## 2023-07-10 DIAGNOSIS — M25.561 RIGHT KNEE PAIN, UNSPECIFIED CHRONICITY: ICD-10-CM

## 2023-07-10 PROCEDURE — 73562 X-RAY EXAM OF KNEE 3: CPT

## 2023-08-13 ENCOUNTER — HOSPITAL ENCOUNTER (EMERGENCY)
Age: 63
Discharge: HOME | End: 2023-08-13
Payer: MEDICAID

## 2023-08-13 ENCOUNTER — APPOINTMENT (OUTPATIENT)
Dept: GENERAL RADIOLOGY | Age: 63
End: 2023-08-13
Payer: MEDICAID

## 2023-08-13 ENCOUNTER — APPOINTMENT (OUTPATIENT)
Dept: ULTRASOUND IMAGING | Age: 63
End: 2023-08-13
Payer: MEDICAID

## 2023-08-13 VITALS
OXYGEN SATURATION: 96 % | RESPIRATION RATE: 16 BRPM | WEIGHT: 162 LBS | SYSTOLIC BLOOD PRESSURE: 108 MMHG | HEART RATE: 82 BPM | BODY MASS INDEX: 27.66 KG/M2 | TEMPERATURE: 98.1 F | HEIGHT: 64 IN | DIASTOLIC BLOOD PRESSURE: 59 MMHG

## 2023-08-13 DIAGNOSIS — R60.0 PEDAL EDEMA: Primary | ICD-10-CM

## 2023-08-13 DIAGNOSIS — D72.829 LEUKOCYTOSIS, UNSPECIFIED TYPE: ICD-10-CM

## 2023-08-13 LAB
ANION GAP SERPL CALCULATED.3IONS-SCNC: 12 MMOL/L (ref 7–16)
BASOPHILS # BLD: 0.13 K/UL (ref 0–0.2)
BASOPHILS NFR BLD: 1 % (ref 0–2)
BUN SERPL-MCNC: 12 MG/DL (ref 6–23)
CALCIUM SERPL-MCNC: 9.9 MG/DL (ref 8.6–10.2)
CHLORIDE SERPL-SCNC: 107 MMOL/L (ref 98–107)
CO2 SERPL-SCNC: 23 MMOL/L (ref 22–29)
CREAT SERPL-MCNC: 0.7 MG/DL (ref 0.5–1)
EOSINOPHIL # BLD: 0.76 K/UL (ref 0.05–0.5)
EOSINOPHILS RELATIVE PERCENT: 5 % (ref 0–6)
ERYTHROCYTE [DISTWIDTH] IN BLOOD BY AUTOMATED COUNT: 15.8 % (ref 11.5–15)
GFR SERPL CREATININE-BSD FRML MDRD: >60 ML/MIN/1.73M2
GLUCOSE SERPL-MCNC: 126 MG/DL (ref 74–99)
HCT VFR BLD AUTO: 43 % (ref 34–48)
HGB BLD-MCNC: 14 G/DL (ref 11.5–15.5)
LYMPHOCYTES NFR BLD: 3.04 K/UL (ref 1.5–4)
LYMPHOCYTES RELATIVE PERCENT: 21 % (ref 20–42)
MCH RBC QN AUTO: 28.9 PG (ref 26–35)
MCHC RBC AUTO-ENTMCNC: 32.6 G/DL (ref 32–34.5)
MCV RBC AUTO: 88.7 FL (ref 80–99.9)
MONOCYTES NFR BLD: 1.39 K/UL (ref 0.1–0.95)
MONOCYTES NFR BLD: 10 % (ref 2–12)
NEUTROPHILS NFR BLD: 63 % (ref 43–80)
NEUTS SEG NFR BLD: 8.98 K/UL (ref 1.8–7.3)
PLATELET CONFIRMATION: NORMAL
PLATELET, FLUORESCENCE: 250 K/UL (ref 130–450)
PMV BLD AUTO: 11.4 FL (ref 7–12)
POTASSIUM SERPL-SCNC: 4.7 MMOL/L (ref 3.5–5)
RBC # BLD AUTO: 4.85 M/UL (ref 3.5–5.5)
RBC # BLD: ABNORMAL 10*6/UL
SODIUM SERPL-SCNC: 142 MMOL/L (ref 132–146)
URATE SERPL-MCNC: 4.6 MG/DL (ref 2.4–5.7)
WBC OTHER # BLD: 14.3 K/UL (ref 4.5–11.5)

## 2023-08-13 PROCEDURE — 85025 COMPLETE CBC W/AUTO DIFF WBC: CPT

## 2023-08-13 PROCEDURE — 93971 EXTREMITY STUDY: CPT

## 2023-08-13 PROCEDURE — 99284 EMERGENCY DEPT VISIT MOD MDM: CPT

## 2023-08-13 PROCEDURE — 73630 X-RAY EXAM OF FOOT: CPT

## 2023-08-13 PROCEDURE — 80048 BASIC METABOLIC PNL TOTAL CA: CPT

## 2023-08-13 PROCEDURE — 84550 ASSAY OF BLOOD/URIC ACID: CPT

## 2023-08-13 ASSESSMENT — PAIN SCALES - GENERAL: PAINLEVEL_OUTOF10: 0

## 2023-10-30 ENCOUNTER — TRANSCRIBE ORDERS (OUTPATIENT)
Dept: ADMINISTRATIVE | Age: 63
End: 2023-10-30

## 2023-11-14 ENCOUNTER — HOSPITAL ENCOUNTER (OUTPATIENT)
Dept: MAMMOGRAPHY | Age: 63
Discharge: HOME | End: 2023-11-16
Attending: SPECIALIST
Payer: MEDICAID

## 2023-11-14 DIAGNOSIS — Z12.31 ENCOUNTER FOR SCREENING MAMMOGRAM FOR MALIGNANT NEOPLASM OF BREAST: ICD-10-CM

## 2023-11-14 PROCEDURE — 77063 BREAST TOMOSYNTHESIS BI: CPT

## 2023-11-16 ENCOUNTER — HOSPITAL ENCOUNTER (OUTPATIENT)
Dept: CT IMAGING | Age: 63
Discharge: HOME | End: 2023-11-16
Attending: INTERNAL MEDICINE
Payer: MEDICAID

## 2023-11-16 DIAGNOSIS — R91.1 SOLITARY PULMONARY NODULE: ICD-10-CM

## 2023-11-16 PROCEDURE — 71250 CT THORAX DX C-: CPT

## 2024-01-24 ENCOUNTER — APPOINTMENT (OUTPATIENT)
Dept: CT IMAGING | Age: 64
End: 2024-01-24
Payer: MEDICAID

## 2024-01-24 ENCOUNTER — HOSPITAL ENCOUNTER (EMERGENCY)
Age: 64
Discharge: HOME | End: 2024-01-24
Payer: MEDICAID

## 2024-01-24 VITALS
BODY MASS INDEX: 28.84 KG/M2 | OXYGEN SATURATION: 96 % | HEART RATE: 88 BPM | DIASTOLIC BLOOD PRESSURE: 57 MMHG | TEMPERATURE: 98.2 F | WEIGHT: 168 LBS | SYSTOLIC BLOOD PRESSURE: 122 MMHG | RESPIRATION RATE: 18 BRPM

## 2024-01-24 DIAGNOSIS — K62.5 RECTAL BLEEDING: Primary | ICD-10-CM

## 2024-01-24 DIAGNOSIS — K59.00 CONSTIPATION, UNSPECIFIED CONSTIPATION TYPE: ICD-10-CM

## 2024-01-24 LAB
ABO + RH BLD: NORMAL
ALBUMIN SERPL-MCNC: 4.6 G/DL (ref 3.5–5.2)
ALP SERPL-CCNC: 83 U/L (ref 35–104)
ALT SERPL-CCNC: 17 U/L (ref 0–32)
ANION GAP SERPL CALCULATED.3IONS-SCNC: 14 MMOL/L (ref 7–16)
ARM BAND NUMBER: NORMAL
AST SERPL-CCNC: 15 U/L (ref 0–31)
BASOPHILS # BLD: 0.21 K/UL (ref 0–0.2)
BASOPHILS NFR BLD: 1 % (ref 0–2)
BILIRUB SERPL-MCNC: 0.3 MG/DL (ref 0–1.2)
BILIRUB UR QL STRIP: NEGATIVE
BLOOD BANK SAMPLE EXPIRATION: NORMAL
BLOOD GROUP ANTIBODIES SERPL: NEGATIVE
BUN SERPL-MCNC: 20 MG/DL (ref 6–23)
CALCIUM SERPL-MCNC: 9.6 MG/DL (ref 8.6–10.2)
CHLORIDE SERPL-SCNC: 108 MMOL/L (ref 98–107)
CLARITY UR: CLEAR
CO2 SERPL-SCNC: 22 MMOL/L (ref 22–29)
COLOR UR: YELLOW
CREAT SERPL-MCNC: 0.9 MG/DL (ref 0.5–1)
EOSINOPHIL # BLD: 0.32 K/UL (ref 0.05–0.5)
EOSINOPHILS RELATIVE PERCENT: 2 % (ref 0–6)
ERYTHROCYTE [DISTWIDTH] IN BLOOD BY AUTOMATED COUNT: 15.7 % (ref 11.5–15)
GFR SERPL CREATININE-BSD FRML MDRD: >60 ML/MIN/1.73M2
GLUCOSE SERPL-MCNC: 247 MG/DL (ref 74–99)
GLUCOSE UR STRIP-MCNC: >=1000 MG/DL
HCT VFR BLD AUTO: 46 % (ref 34–48)
HEMOCCULT STL QL: NORMAL
HGB BLD-MCNC: 14.6 G/DL (ref 11.5–15.5)
HGB UR QL STRIP.AUTO: ABNORMAL
IMM GRANULOCYTES # BLD AUTO: 0.07 K/UL (ref 0–0.58)
IMM GRANULOCYTES NFR BLD: 0 % (ref 0–5)
INR PPP: 1
KETONES UR STRIP-MCNC: ABNORMAL MG/DL
LACTATE BLDV-SCNC: 1.9 MMOL/L (ref 0.5–2.2)
LACTATE BLDV-SCNC: 2.6 MMOL/L (ref 0.5–2.2)
LEUKOCYTE ESTERASE UR QL STRIP: NEGATIVE
LIPASE SERPL-CCNC: 55 U/L (ref 13–60)
LYMPHOCYTES NFR BLD: 3.32 K/UL (ref 1.5–4)
LYMPHOCYTES RELATIVE PERCENT: 19 % (ref 20–42)
MCH RBC QN AUTO: 28.8 PG (ref 26–35)
MCHC RBC AUTO-ENTMCNC: 31.7 G/DL (ref 32–34.5)
MCV RBC AUTO: 90.7 FL (ref 80–99.9)
MONOCYTES NFR BLD: 1.45 K/UL (ref 0.1–0.95)
MONOCYTES NFR BLD: 8 % (ref 2–12)
NEUTROPHILS NFR BLD: 70 % (ref 43–80)
NEUTS SEG NFR BLD: 12.51 K/UL (ref 1.8–7.3)
NITRITE UR QL STRIP: NEGATIVE
PH UR STRIP: 5.5 [PH] (ref 5–9)
PLATELET # BLD AUTO: 442 K/UL (ref 130–450)
PMV BLD AUTO: 10.7 FL (ref 7–12)
POTASSIUM SERPL-SCNC: 4.7 MMOL/L (ref 3.5–5)
PROT SERPL-MCNC: 7.3 G/DL (ref 6.4–8.3)
PROT UR STRIP-MCNC: NEGATIVE MG/DL
PROTHROMBIN TIME: 10.9 SEC (ref 9.3–12.4)
RBC # BLD AUTO: 5.07 M/UL (ref 3.5–5.5)
RBC #/AREA URNS HPF: NORMAL /HPF
SODIUM SERPL-SCNC: 144 MMOL/L (ref 132–146)
SP GR UR STRIP: 1.01 (ref 1–1.03)
UROBILINOGEN UR STRIP-ACNC: 0.2 EU/DL (ref 0–1)
WBC #/AREA URNS HPF: NORMAL /HPF
WBC OTHER # BLD: 17.9 K/UL (ref 4.5–11.5)

## 2024-01-24 PROCEDURE — 85025 COMPLETE CBC W/AUTO DIFF WBC: CPT

## 2024-01-24 PROCEDURE — 83605 ASSAY OF LACTIC ACID: CPT

## 2024-01-24 PROCEDURE — 99285 EMERGENCY DEPT VISIT HI MDM: CPT

## 2024-01-24 PROCEDURE — 81001 URINALYSIS AUTO W/SCOPE: CPT

## 2024-01-24 PROCEDURE — 83690 ASSAY OF LIPASE: CPT

## 2024-01-24 PROCEDURE — 86850 RBC ANTIBODY SCREEN: CPT

## 2024-01-24 PROCEDURE — 85610 PROTHROMBIN TIME: CPT

## 2024-01-24 PROCEDURE — 6360000004 HC RX CONTRAST MEDICATION: Performed by: RADIOLOGY

## 2024-01-24 PROCEDURE — 2580000003 HC RX 258: Performed by: NURSE PRACTITIONER

## 2024-01-24 PROCEDURE — 86901 BLOOD TYPING SEROLOGIC RH(D): CPT

## 2024-01-24 PROCEDURE — 86900 BLOOD TYPING SEROLOGIC ABO: CPT

## 2024-01-24 PROCEDURE — 80053 COMPREHEN METABOLIC PANEL: CPT

## 2024-01-24 PROCEDURE — 74177 CT ABD & PELVIS W/CONTRAST: CPT

## 2024-01-24 RX ORDER — 0.9 % SODIUM CHLORIDE 0.9 %
1000 INTRAVENOUS SOLUTION INTRAVENOUS ONCE
Status: COMPLETED | OUTPATIENT
Start: 2024-01-24 | End: 2024-01-24

## 2024-01-24 RX ADMIN — IOPAMIDOL 75 ML: 755 INJECTION, SOLUTION INTRAVENOUS at 17:31

## 2024-01-24 RX ADMIN — SODIUM CHLORIDE 1000 ML: 9 INJECTION, SOLUTION INTRAVENOUS at 15:26

## 2024-01-24 ASSESSMENT — PAIN - FUNCTIONAL ASSESSMENT: PAIN_FUNCTIONAL_ASSESSMENT: NONE - DENIES PAIN

## 2024-01-24 ASSESSMENT — LIFESTYLE VARIABLES
HOW OFTEN DO YOU HAVE A DRINK CONTAINING ALCOHOL: MONTHLY OR LESS
HOW MANY STANDARD DRINKS CONTAINING ALCOHOL DO YOU HAVE ON A TYPICAL DAY: PATIENT DOES NOT DRINK

## 2024-01-24 NOTE — ED PROVIDER NOTES
IMPRESSION:  1. No acute intra-abdominal or pelvic process.  2. Fatty enlarged liver.  3. Diverticulosis.  4. Large stool burden.  Patient takes Senokot, Colace and MiraLAX daily.  I did advise that she increase the fiber in her diet or consider taking a fiber supplement like Benefiber.  Also advised patient to follow-up with her GI specialist.  She is also given strict return instructions. [MR]      ED Course User Index  [MR] Merna Torres APRN - CNP       Counseling:   The emergency provider has spoken with the patient and discussed today’s results, in addition to providing specific details for the plan of care and counseling regarding the diagnosis and prognosis.  Questions are answered at this time and they are agreeable with the plan.      --------------------------------- IMPRESSION AND DISPOSITION ---------------------------------    IMPRESSION  1. Rectal bleeding    2. Constipation, unspecified constipation type        DISPOSITION  Disposition: Discharge to home  Patient condition is good                  Merna Torres APRN - CNP  01/24/24 [id number]

## 2024-03-06 ENCOUNTER — HOSPITAL ENCOUNTER (OUTPATIENT)
Dept: CT IMAGING | Age: 64
Discharge: HOME | End: 2024-03-06
Attending: INTERNAL MEDICINE
Payer: MEDICAID

## 2024-03-06 DIAGNOSIS — R91.8 LUNG MASS: ICD-10-CM

## 2024-03-06 PROCEDURE — 71250 CT THORAX DX C-: CPT

## 2024-05-06 ENCOUNTER — APPOINTMENT (OUTPATIENT)
Dept: GENERAL RADIOLOGY | Age: 64
End: 2024-05-06
Payer: MEDICAID

## 2024-05-06 ENCOUNTER — HOSPITAL ENCOUNTER (EMERGENCY)
Age: 64
Discharge: HOME | End: 2024-05-06
Attending: EMERGENCY MEDICINE
Payer: MEDICAID

## 2024-05-06 VITALS
HEIGHT: 64 IN | TEMPERATURE: 98 F | RESPIRATION RATE: 16 BRPM | BODY MASS INDEX: 29.02 KG/M2 | DIASTOLIC BLOOD PRESSURE: 74 MMHG | OXYGEN SATURATION: 93 % | SYSTOLIC BLOOD PRESSURE: 120 MMHG | WEIGHT: 170 LBS | HEART RATE: 95 BPM

## 2024-05-06 DIAGNOSIS — M79.672 LEFT FOOT PAIN: Primary | ICD-10-CM

## 2024-05-06 PROCEDURE — 73630 X-RAY EXAM OF FOOT: CPT

## 2024-05-06 PROCEDURE — 99283 EMERGENCY DEPT VISIT LOW MDM: CPT

## 2024-05-06 PROCEDURE — 6370000000 HC RX 637 (ALT 250 FOR IP)

## 2024-05-06 RX ORDER — IBUPROFEN 600 MG/1
600 TABLET ORAL ONCE
Status: COMPLETED | OUTPATIENT
Start: 2024-05-06 | End: 2024-05-06

## 2024-05-06 RX ADMIN — IBUPROFEN 600 MG: 600 TABLET, FILM COATED ORAL at 06:18

## 2024-05-06 ASSESSMENT — LIFESTYLE VARIABLES
HOW OFTEN DO YOU HAVE A DRINK CONTAINING ALCOHOL: NEVER
HOW MANY STANDARD DRINKS CONTAINING ALCOHOL DO YOU HAVE ON A TYPICAL DAY: PATIENT DOES NOT DRINK

## 2024-05-06 ASSESSMENT — PAIN DESCRIPTION - ORIENTATION
ORIENTATION: LEFT
ORIENTATION: LEFT

## 2024-05-06 ASSESSMENT — PAIN SCALES - GENERAL
PAINLEVEL_OUTOF10: 1
PAINLEVEL_OUTOF10: 4
PAINLEVEL_OUTOF10: 3
PAINLEVEL_OUTOF10: 3

## 2024-05-06 ASSESSMENT — PAIN DESCRIPTION - LOCATION: LOCATION: FOOT

## 2024-05-06 ASSESSMENT — PAIN - FUNCTIONAL ASSESSMENT
PAIN_FUNCTIONAL_ASSESSMENT: 0-10
PAIN_FUNCTIONAL_ASSESSMENT: 0-10

## 2024-05-06 ASSESSMENT — PAIN DESCRIPTION - DESCRIPTORS: DESCRIPTORS: ACHING

## 2024-05-06 NOTE — DISCHARGE INSTRUCTIONS
Ibuprofen for pain:  400 mg every 8 hours as needed for pain. Do not take for longer than 7 days unless directed by your primary care doctor. Do not take naproxen (Aleve) at the same time you are taking ibuprofen.    Tylenol for pain:  325 to 500 mg every 6 hours as needed.

## 2024-11-12 ENCOUNTER — TRANSCRIBE ORDERS (OUTPATIENT)
Dept: ADMINISTRATIVE | Age: 64
End: 2024-11-12

## 2024-11-12 DIAGNOSIS — Z12.31 ENCOUNTER FOR SCREENING MAMMOGRAM FOR MALIGNANT NEOPLASM OF BREAST: Primary | ICD-10-CM

## 2024-12-05 ENCOUNTER — HOSPITAL ENCOUNTER (OUTPATIENT)
Dept: MAMMOGRAPHY | Age: 64
Discharge: HOME | End: 2024-12-07
Attending: SPECIALIST
Payer: MEDICAID

## 2024-12-05 VITALS — WEIGHT: 171 LBS | HEIGHT: 64 IN | BODY MASS INDEX: 29.19 KG/M2

## 2024-12-05 DIAGNOSIS — Z12.31 ENCOUNTER FOR SCREENING MAMMOGRAM FOR MALIGNANT NEOPLASM OF BREAST: ICD-10-CM

## 2024-12-05 PROCEDURE — 77063 BREAST TOMOSYNTHESIS BI: CPT

## 2024-12-15 ENCOUNTER — APPOINTMENT (OUTPATIENT)
Dept: GENERAL RADIOLOGY | Age: 64
End: 2024-12-15
Payer: MEDICAID

## 2024-12-15 ENCOUNTER — HOSPITAL ENCOUNTER (EMERGENCY)
Age: 64
Discharge: HOME | End: 2024-12-15
Attending: EMERGENCY MEDICINE
Payer: MEDICAID

## 2024-12-15 VITALS
OXYGEN SATURATION: 95 % | HEART RATE: 107 BPM | SYSTOLIC BLOOD PRESSURE: 116 MMHG | TEMPERATURE: 97.6 F | DIASTOLIC BLOOD PRESSURE: 54 MMHG | RESPIRATION RATE: 18 BRPM

## 2024-12-15 DIAGNOSIS — S92.412A CLOSED DISPLACED FRACTURE OF PROXIMAL PHALANX OF LEFT GREAT TOE, INITIAL ENCOUNTER: Primary | ICD-10-CM

## 2024-12-15 PROCEDURE — 6370000000 HC RX 637 (ALT 250 FOR IP): Performed by: EMERGENCY MEDICINE

## 2024-12-15 PROCEDURE — 99283 EMERGENCY DEPT VISIT LOW MDM: CPT

## 2024-12-15 PROCEDURE — 73630 X-RAY EXAM OF FOOT: CPT

## 2024-12-15 PROCEDURE — 28495 TREAT BIG TOE FRACTURE: CPT

## 2024-12-15 RX ORDER — IBUPROFEN 800 MG/1
800 TABLET, FILM COATED ORAL ONCE
Status: COMPLETED | OUTPATIENT
Start: 2024-12-15 | End: 2024-12-15

## 2024-12-15 RX ORDER — OXYCODONE AND ACETAMINOPHEN 7.5; 325 MG/1; MG/1
1 TABLET ORAL 2 TIMES DAILY
Qty: 8 TABLET | Refills: 0 | Status: SHIPPED | OUTPATIENT
Start: 2024-12-15 | End: 2024-12-19

## 2024-12-15 RX ADMIN — IBUPROFEN 800 MG: 800 TABLET, FILM COATED ORAL at 08:53

## 2024-12-15 ASSESSMENT — LIFESTYLE VARIABLES
HOW MANY STANDARD DRINKS CONTAINING ALCOHOL DO YOU HAVE ON A TYPICAL DAY: PATIENT DOES NOT DRINK
HOW OFTEN DO YOU HAVE A DRINK CONTAINING ALCOHOL: NEVER

## 2024-12-15 NOTE — ED PROVIDER NOTES
Chief complaint:  Left great toe pain        HPI history provided by the patient  Patient with a chief complaint of left great toe pain.  Slipped on ice earlier and fell the ground hitting her left great toe and foot.  Denies other injuries, did not hit her head or neck or back and has no headache or neck pain or back pain.  No chest pain or palpitations or shortness of breath.  No lightheadedness or syncope.  No other arm or leg pain or injuries, no extremity numbness, tingling, paresthesia or weakness.  No neck or back pain.  No abdominal pain.    Review of Systems   Constitutional:  Negative for chills, diaphoresis, fatigue and fever.   HENT:  Negative for congestion and sore throat.    Respiratory:  Negative for cough, chest tightness, shortness of breath and wheezing.    Cardiovascular:  Negative for chest pain, palpitations and leg swelling.   Gastrointestinal:  Negative for abdominal pain, diarrhea, nausea and vomiting.   Genitourinary:  Negative for dysuria, flank pain, frequency and urgency.   Musculoskeletal:  Positive for arthralgias. Negative for back pain, gait problem, joint swelling, myalgias, neck pain and neck stiffness.   Skin:  Negative for rash and wound.   Neurological:  Negative for dizziness, seizures, syncope, light-headedness, numbness and headaches.   All other systems reviewed and are negative.       Physical Exam  Vitals and nursing note reviewed.   Constitutional:       General: She is awake. She is not in acute distress.     Appearance: She is well-developed. She is not ill-appearing, toxic-appearing or diaphoretic.   HENT:      Head: Normocephalic and atraumatic.      Comments: No sign of acute head or face injury  Eyes:      General: No scleral icterus.     Pupils: Pupils are equal, round, and reactive to light.   Cardiovascular:      Rate and Rhythm: Normal rate and regular rhythm.      Heart sounds: Normal heart sounds. No murmur heard.  Pulmonary:      Effort: Pulmonary effort is

## (undated) DEVICE — ELECTRODE PT RET AD L9FT HI MOIST COND ADH HYDRGEL CORDED

## (undated) DEVICE — HYDROPHILIC COATED RED RUBBER URETHRAL CATHETER, SMOOTH ROUNDED TIP, 20 FR (6.7 MM): Brand: DOVER

## (undated) DEVICE — STAPLER SKIN L440MM 32MM LNG 12 FIRING B FRM PWR + GRIPPING

## (undated) DEVICE — NEEDLE HYPO 25GA L1.5IN BLU POLYPR HUB S STL REG BVL STR

## (undated) DEVICE — COVER,LIGHT HANDLE,FLX,1/PK: Brand: MEDLINE INDUSTRIES, INC.

## (undated) DEVICE — INSUFFLATION NEEDLE TO ESTABLISH PNEUMOPERITONEUM.: Brand: INSUFFLATION NEEDLE

## (undated) DEVICE — Device: Brand: TISSUE RETRIEVAL SYSTEM

## (undated) DEVICE — RELOAD STPL L60MM H1-2.6MM MESENTERY THN TISS WHT 6 ROW

## (undated) DEVICE — AIRSEAL 5 MM ACCESS PORT AND LOW PROFILE OBTURATOR WITH BLADELESS OPTICAL TIP, 120 MM LENGTH: Brand: AIRSEAL

## (undated) DEVICE — SUCTION IRRIGATOR: Brand: ENDOWRIST

## (undated) DEVICE — VESSEL SEALER EXTEND: Brand: ENDOWRIST

## (undated) DEVICE — SET INST DAVINCI XI ACCESSORIES

## (undated) DEVICE — CADIERE FORCEPS: Brand: ENDOWRIST

## (undated) DEVICE — SCOPE DAVINCI XI 30 DEG W/CORD

## (undated) DEVICE — TRI-LUMEN FILTERED TUBE SET WITH ACTIVATED CHARCOAL FILTER: Brand: AIRSEAL

## (undated) DEVICE — TOWEL,OR,DSP,ST,BLUE,STD,6/PK,12PK/CS: Brand: MEDLINE

## (undated) DEVICE — MARKER,SKIN,WI/RULER AND LABELS: Brand: MEDLINE

## (undated) DEVICE — AGENT HEMSTAT W2XL4IN OXIDIZED REGENERATED CELOS ABSRB

## (undated) DEVICE — BLADE ES ELASTOMERIC COAT INSUL DURABLE BEND UPTO 90DEG

## (undated) DEVICE — GLOVE ORANGE PI 7 1/2   MSG9075

## (undated) DEVICE — KIT BEDSIDE REVITAL OX 500ML

## (undated) DEVICE — GOWN,SIRUS,FABRNF,XL,20/CS: Brand: MEDLINE

## (undated) DEVICE — ANTI-FOG SOLUTION WITH FOAM PAD: Brand: DEVON

## (undated) DEVICE — 40586 ADVANCED TRENDELENBURG POSITIONING KIT: Brand: 40586 ADVANCED TRENDELENBURG POSITIONING KIT

## (undated) DEVICE — ARM DRAPE

## (undated) DEVICE — SYRINGE MED 10ML TRNSLUC BRL PLUNG BLK MRK POLYPR CTRL

## (undated) DEVICE — COLUMN DRAPE

## (undated) DEVICE — TROCAR: Brand: KII FIOS FIRST ENTRY

## (undated) DEVICE — TUBING, SUCTION, 1/4" X 10', STRAIGHT: Brand: MEDLINE

## (undated) DEVICE — MICRO TIP WIPE: Brand: DEVON

## (undated) DEVICE — APPLICATOR MEDICATED 26 CC SOLUTION HI LT ORNG CHLORAPREP

## (undated) DEVICE — SPONGE,LAP,12"X12",XR,ST,5/PK,40PK/CS: Brand: MEDLINE

## (undated) DEVICE — TOTAL TRAY, 16FR 10ML SIL FOLEY, URN: Brand: MEDLINE

## (undated) DEVICE — Device: Brand: INSTRUSAFE

## (undated) DEVICE — SHEET DRAPE FULL 70X100

## (undated) DEVICE — STAPLER INT L28CM DIA29MM CLS STPL H10-2.5MM OPN LEG L5.5MM

## (undated) DEVICE — CANNULA SEAL

## (undated) DEVICE — SET MAJOR INSTR HOUSE

## (undated) DEVICE — PACK SURG LAP CHOLE CUSTOM

## (undated) DEVICE — LAPAROSCOPIC ACCESS SYSTEM: Brand: ALEXIS LAPAROSCOPIC SYSTEM WITH KII FIOS FIRST ENTRY

## (undated) DEVICE — SET ENDO INSTR LAPAROSCOPIC STGENLAP

## (undated) DEVICE — REDUCER: Brand: ENDOWRIST

## (undated) DEVICE — TIP-UP FENESTRATED GRASPER: Brand: ENDOWRIST

## (undated) DEVICE — SOLUTION IV IRRIG WATER 1000ML POUR BRL 2F7114

## (undated) DEVICE — SYRINGE 20ML LL S/C 50

## (undated) DEVICE — TISSUE RETRIEVAL SYSTEM: Brand: INZII RETRIEVAL SYSTEM

## (undated) DEVICE — SEALER/DIVIDER LAP SHFT L44CM DIA5MM STR BLNT TIP JAW HND

## (undated) DEVICE — BLADELESS OBTURATOR: Brand: WECK VISTA

## (undated) DEVICE — SYRINGE MED 10ML LUERLOCK TIP W/O SFTY DISP

## (undated) DEVICE — STAPLER-OBTURATOR DAVINCI XI 12MM

## (undated) DEVICE — Z INACTIVE USE 2660664 SOLUTION IRRIG 3000ML 0.9% SOD CHL USP UROMATIC PLAS CONT

## (undated) DEVICE — READY WET SKIN SCRUB TRAY-LF: Brand: MEDLINE INDUSTRIES, INC.

## (undated) DEVICE — STAPLER 60: Brand: SUREFORM

## (undated) DEVICE — STAPLER SHEATH: Brand: ENDOWRIST

## (undated) DEVICE — DOUBLE BASIN SET: Brand: MEDLINE INDUSTRIES, INC.